# Patient Record
Sex: MALE | Race: WHITE | NOT HISPANIC OR LATINO | Employment: FULL TIME | RURAL
[De-identification: names, ages, dates, MRNs, and addresses within clinical notes are randomized per-mention and may not be internally consistent; named-entity substitution may affect disease eponyms.]

---

## 2017-03-11 PROBLEM — Z00.00 WELLNESS EXAMINATION: Status: ACTIVE | Noted: 2017-03-11

## 2017-03-11 PROBLEM — I10 HYPERTENSION: Chronic | Status: ACTIVE | Noted: 2017-03-11

## 2017-03-11 PROBLEM — E78.5 HYPERLIPIDEMIA: Chronic | Status: ACTIVE | Noted: 2017-03-11

## 2017-03-11 PROBLEM — R00.2 PALPITATIONS: Status: ACTIVE | Noted: 2017-03-11

## 2017-03-11 PROBLEM — K21.9 GASTROESOPHAGEAL REFLUX DISEASE: Chronic | Status: ACTIVE | Noted: 2017-03-11

## 2017-04-03 ENCOUNTER — LAB (OUTPATIENT)
Dept: FAMILY MEDICINE CLINIC | Facility: CLINIC | Age: 67
End: 2017-04-03

## 2017-04-03 DIAGNOSIS — Z00.00 WELLNESS EXAMINATION: ICD-10-CM

## 2017-04-03 DIAGNOSIS — I10 ESSENTIAL HYPERTENSION: Chronic | ICD-10-CM

## 2017-04-03 DIAGNOSIS — E78.5 HYPERLIPIDEMIA, UNSPECIFIED HYPERLIPIDEMIA TYPE: Primary | Chronic | ICD-10-CM

## 2017-04-03 DIAGNOSIS — Z12.5 SPECIAL SCREENING FOR MALIGNANT NEOPLASM OF PROSTATE: ICD-10-CM

## 2017-04-03 LAB
ALBUMIN SERPL-MCNC: 3.7 G/DL (ref 3.5–5)
ALBUMIN/GLOB SERPL: 1.2 G/DL (ref 1.1–2.5)
ALP SERPL-CCNC: 82 U/L (ref 24–120)
ALT SERPL-CCNC: 33 U/L (ref 0–54)
AST SERPL-CCNC: 25 U/L (ref 7–45)
BASOPHILS # BLD AUTO: 0.08 10*3/MM3 (ref 0–0.2)
BASOPHILS NFR BLD AUTO: 1.2 % (ref 0–2)
BILIRUB SERPL-MCNC: 0.6 MG/DL (ref 0.1–1)
BUN SERPL-MCNC: 17 MG/DL (ref 5–21)
BUN/CREAT SERPL: 17.2 (ref 7–25)
CALCIUM SERPL-MCNC: 9.2 MG/DL (ref 8.4–10.4)
CHLORIDE SERPL-SCNC: 104 MMOL/L (ref 98–110)
CHOLEST SERPL-MCNC: 172 MG/DL (ref 130–200)
CO2 SERPL-SCNC: 28 MMOL/L (ref 24–31)
CREAT SERPL-MCNC: 0.99 MG/DL (ref 0.5–1.4)
EOSINOPHIL # BLD AUTO: 0.29 10*3/MM3 (ref 0–0.7)
EOSINOPHIL NFR BLD AUTO: 4.3 % (ref 0–4)
ERYTHROCYTE [DISTWIDTH] IN BLOOD BY AUTOMATED COUNT: 13.9 % (ref 12–15)
GLOBULIN SER CALC-MCNC: 3.1 GM/DL
GLUCOSE SERPL-MCNC: 87 MG/DL (ref 70–100)
HCT VFR BLD AUTO: 42.2 % (ref 40–52)
HDLC SERPL-MCNC: 50 MG/DL
HGB BLD-MCNC: 13.8 G/DL (ref 14–18)
IMM GRANULOCYTES # BLD: 0.02 10*3/MM3 (ref 0–0.03)
IMM GRANULOCYTES NFR BLD: 0.3 % (ref 0–5)
LDLC SERPL CALC-MCNC: 106 MG/DL (ref 0–99)
LYMPHOCYTES # BLD AUTO: 1.99 10*3/MM3 (ref 0.72–4.86)
LYMPHOCYTES NFR BLD AUTO: 29.7 % (ref 15–45)
MCH RBC QN AUTO: 30.7 PG (ref 28–32)
MCHC RBC AUTO-ENTMCNC: 32.7 G/DL (ref 33–36)
MCV RBC AUTO: 93.8 FL (ref 82–95)
MONOCYTES # BLD AUTO: 0.57 10*3/MM3 (ref 0.19–1.3)
MONOCYTES NFR BLD AUTO: 8.5 % (ref 4–12)
NEUTROPHILS # BLD AUTO: 3.74 10*3/MM3 (ref 1.87–8.4)
NEUTROPHILS NFR BLD AUTO: 56 % (ref 39–78)
PLATELET # BLD AUTO: 261 10*3/MM3 (ref 130–400)
POTASSIUM SERPL-SCNC: 4.3 MMOL/L (ref 3.5–5.3)
PROT SERPL-MCNC: 6.8 G/DL (ref 6.3–8.7)
PSA SERPL-MCNC: 2.49 NG/ML (ref 0–4)
RBC # BLD AUTO: 4.5 10*6/MM3 (ref 4.8–5.9)
SODIUM SERPL-SCNC: 143 MMOL/L (ref 135–145)
TRIGL SERPL-MCNC: 80 MG/DL (ref 0–149)
TSH SERPL DL<=0.005 MIU/L-ACNC: 1.1 MIU/ML (ref 0.47–4.68)
VLDLC SERPL CALC-MCNC: 16 MG/DL
WBC # BLD AUTO: 6.69 10*3/MM3 (ref 4.8–10.8)

## 2017-04-13 ENCOUNTER — OFFICE VISIT (OUTPATIENT)
Dept: FAMILY MEDICINE CLINIC | Facility: CLINIC | Age: 67
End: 2017-04-13

## 2017-04-13 VITALS
BODY MASS INDEX: 27.4 KG/M2 | RESPIRATION RATE: 16 BRPM | HEIGHT: 69 IN | HEART RATE: 72 BPM | SYSTOLIC BLOOD PRESSURE: 130 MMHG | OXYGEN SATURATION: 97 % | DIASTOLIC BLOOD PRESSURE: 70 MMHG | WEIGHT: 185 LBS

## 2017-04-13 DIAGNOSIS — I10 ESSENTIAL HYPERTENSION: Chronic | ICD-10-CM

## 2017-04-13 DIAGNOSIS — M19.90 OSTEOARTHRITIS, UNSPECIFIED OSTEOARTHRITIS TYPE, UNSPECIFIED SITE: ICD-10-CM

## 2017-04-13 DIAGNOSIS — E78.5 HYPERLIPIDEMIA, UNSPECIFIED HYPERLIPIDEMIA TYPE: Chronic | ICD-10-CM

## 2017-04-13 DIAGNOSIS — K21.9 GASTROESOPHAGEAL REFLUX DISEASE, ESOPHAGITIS PRESENCE NOT SPECIFIED: Primary | Chronic | ICD-10-CM

## 2017-04-13 DIAGNOSIS — R00.2 PALPITATIONS: ICD-10-CM

## 2017-04-13 PROCEDURE — 99213 OFFICE O/P EST LOW 20 MIN: CPT | Performed by: FAMILY MEDICINE

## 2017-04-13 RX ORDER — ENALAPRIL MALEATE 20 MG/1
20 TABLET ORAL DAILY
Qty: 90 TABLET | Refills: 1 | Status: SHIPPED | OUTPATIENT
Start: 2017-04-13 | End: 2017-12-11 | Stop reason: SDUPTHER

## 2017-04-13 RX ORDER — ASPIRIN 325 MG
325 TABLET, DELAYED RELEASE (ENTERIC COATED) ORAL DAILY
COMMUNITY
End: 2019-03-27

## 2017-04-18 ENCOUNTER — TELEPHONE (OUTPATIENT)
Dept: FAMILY MEDICINE CLINIC | Facility: CLINIC | Age: 67
End: 2017-04-18

## 2017-04-18 RX ORDER — TAMSULOSIN HYDROCHLORIDE 0.4 MG/1
1 CAPSULE ORAL NIGHTLY
Qty: 90 CAPSULE | Refills: 1 | Status: SHIPPED | OUTPATIENT
Start: 2017-04-18 | End: 2018-04-30 | Stop reason: SDDI

## 2017-04-18 NOTE — TELEPHONE ENCOUNTER
"Vm \"I seen Dr Carson the other day and told him i thought I had a kidney issue coming on and he told me to watch it, and he has developed, can I get something called in for it?\"  "

## 2017-05-16 ENCOUNTER — OFFICE VISIT (OUTPATIENT)
Dept: URGENT CARE | Age: 67
End: 2017-05-16
Payer: COMMERCIAL

## 2017-05-16 VITALS
HEIGHT: 69 IN | OXYGEN SATURATION: 96 % | TEMPERATURE: 98.8 F | BODY MASS INDEX: 26.31 KG/M2 | DIASTOLIC BLOOD PRESSURE: 84 MMHG | RESPIRATION RATE: 20 BRPM | WEIGHT: 177.6 LBS | HEART RATE: 86 BPM | SYSTOLIC BLOOD PRESSURE: 138 MMHG

## 2017-05-16 DIAGNOSIS — J06.9 URI WITH COUGH AND CONGESTION: Primary | ICD-10-CM

## 2017-05-16 DIAGNOSIS — J02.9 SORE THROAT: ICD-10-CM

## 2017-05-16 LAB — S PYO AG THROAT QL: NORMAL

## 2017-05-16 PROCEDURE — 99202 OFFICE O/P NEW SF 15 MIN: CPT | Performed by: NURSE PRACTITIONER

## 2017-05-16 PROCEDURE — 87880 STREP A ASSAY W/OPTIC: CPT | Performed by: NURSE PRACTITIONER

## 2017-05-16 RX ORDER — ENALAPRIL MALEATE 20 MG/1
TABLET ORAL
COMMUNITY
Start: 2017-03-30

## 2017-05-16 RX ORDER — AZITHROMYCIN 250 MG/1
TABLET, FILM COATED ORAL
Qty: 1 PACKET | Refills: 0 | Status: SHIPPED | OUTPATIENT
Start: 2017-05-16 | End: 2017-05-26 | Stop reason: ALTCHOICE

## 2017-05-16 RX ORDER — GUAIFENESIN AND DEXTROMETHORPHAN HYDROBROMIDE 1200; 60 MG/1; MG/1
1 TABLET, EXTENDED RELEASE ORAL 2 TIMES DAILY
Qty: 28 TABLET | Refills: 0 | Status: SHIPPED | OUTPATIENT
Start: 2017-05-16

## 2017-05-16 RX ORDER — TAMSULOSIN HYDROCHLORIDE 0.4 MG/1
CAPSULE ORAL
COMMUNITY
Start: 2017-04-18 | End: 2017-05-16 | Stop reason: ALTCHOICE

## 2017-05-16 RX ORDER — BENZONATATE 100 MG/1
100 CAPSULE ORAL 3 TIMES DAILY PRN
Qty: 21 CAPSULE | Refills: 0 | Status: SHIPPED | OUTPATIENT
Start: 2017-05-16 | End: 2017-05-23

## 2017-05-16 ASSESSMENT — ENCOUNTER SYMPTOMS
NAUSEA: 0
SINUS PRESSURE: 1
SHORTNESS OF BREATH: 1
COUGH: 1
VOMITING: 0
RHINORRHEA: 1
SORE THROAT: 1
DIARRHEA: 0

## 2017-05-26 ENCOUNTER — OFFICE VISIT (OUTPATIENT)
Dept: URGENT CARE | Age: 67
End: 2017-05-26
Payer: COMMERCIAL

## 2017-05-26 VITALS
DIASTOLIC BLOOD PRESSURE: 88 MMHG | HEART RATE: 94 BPM | TEMPERATURE: 98.1 F | WEIGHT: 177 LBS | OXYGEN SATURATION: 98 % | BODY MASS INDEX: 26.22 KG/M2 | SYSTOLIC BLOOD PRESSURE: 139 MMHG | HEIGHT: 69 IN | RESPIRATION RATE: 18 BRPM

## 2017-05-26 DIAGNOSIS — J01.10 ACUTE NON-RECURRENT FRONTAL SINUSITIS: Primary | ICD-10-CM

## 2017-05-26 PROCEDURE — 99213 OFFICE O/P EST LOW 20 MIN: CPT | Performed by: PHYSICIAN ASSISTANT

## 2017-05-26 PROCEDURE — 96372 THER/PROPH/DIAG INJ SC/IM: CPT | Performed by: PHYSICIAN ASSISTANT

## 2017-05-26 RX ORDER — FLUTICASONE PROPIONATE 50 MCG
2 SPRAY, SUSPENSION (ML) NASAL DAILY
Qty: 1 BOTTLE | Refills: 0 | Status: SHIPPED | OUTPATIENT
Start: 2017-05-26

## 2017-05-26 RX ORDER — DEXAMETHASONE SODIUM PHOSPHATE 10 MG/ML
10 INJECTION INTRAMUSCULAR; INTRAVENOUS ONCE
Status: COMPLETED | OUTPATIENT
Start: 2017-05-26 | End: 2017-05-26

## 2017-05-26 RX ADMIN — DEXAMETHASONE SODIUM PHOSPHATE 10 MG: 10 INJECTION INTRAMUSCULAR; INTRAVENOUS at 11:48

## 2017-05-26 ASSESSMENT — ENCOUNTER SYMPTOMS
DIARRHEA: 0
NAUSEA: 0
RHINORRHEA: 1
ABDOMINAL PAIN: 0
SORE THROAT: 0
SINUS PRESSURE: 1
COUGH: 1
VOMITING: 0

## 2017-06-21 ENCOUNTER — PREP FOR SURGERY (OUTPATIENT)
Dept: OTHER | Facility: HOSPITAL | Age: 67
End: 2017-06-21

## 2017-06-21 DIAGNOSIS — Z12.11 ENCOUNTER FOR SCREENING COLONOSCOPY: Primary | ICD-10-CM

## 2017-06-22 RX ORDER — LIDOCAINE HYDROCHLORIDE 10 MG/ML
0.1 INJECTION, SOLUTION EPIDURAL; INFILTRATION; INTRACAUDAL; PERINEURAL ONCE AS NEEDED
Status: CANCELLED | OUTPATIENT
Start: 2017-06-22

## 2017-06-22 RX ORDER — SODIUM CHLORIDE 0.9 % (FLUSH) 0.9 %
1-10 SYRINGE (ML) INJECTION AS NEEDED
Status: CANCELLED | OUTPATIENT
Start: 2017-06-22

## 2017-07-31 ENCOUNTER — ANESTHESIA (OUTPATIENT)
Dept: GASTROENTEROLOGY | Facility: HOSPITAL | Age: 67
End: 2017-07-31

## 2017-07-31 ENCOUNTER — ANESTHESIA EVENT (OUTPATIENT)
Dept: GASTROENTEROLOGY | Facility: HOSPITAL | Age: 67
End: 2017-07-31

## 2017-07-31 ENCOUNTER — HOSPITAL ENCOUNTER (OUTPATIENT)
Facility: HOSPITAL | Age: 67
Setting detail: HOSPITAL OUTPATIENT SURGERY
Discharge: HOME | End: 2017-07-31
Attending: INTERNAL MEDICINE | Admitting: INTERNAL MEDICINE

## 2017-07-31 VITALS
HEIGHT: 69 IN | RESPIRATION RATE: 18 BRPM | WEIGHT: 174 LBS | BODY MASS INDEX: 25.77 KG/M2 | DIASTOLIC BLOOD PRESSURE: 58 MMHG | TEMPERATURE: 98.2 F | HEART RATE: 51 BPM | SYSTOLIC BLOOD PRESSURE: 120 MMHG | OXYGEN SATURATION: 99 %

## 2017-07-31 DIAGNOSIS — Z12.11 ENCOUNTER FOR SCREENING COLONOSCOPY: ICD-10-CM

## 2017-07-31 PROCEDURE — 45385 COLONOSCOPY W/LESION REMOVAL: CPT | Performed by: INTERNAL MEDICINE

## 2017-07-31 PROCEDURE — 88305 TISSUE EXAM BY PATHOLOGIST: CPT | Performed by: INTERNAL MEDICINE

## 2017-07-31 PROCEDURE — 25010000002 PROPOFOL 10 MG/ML EMULSION: Performed by: NURSE ANESTHETIST, CERTIFIED REGISTERED

## 2017-07-31 RX ORDER — ONDANSETRON 2 MG/ML
4 INJECTION INTRAMUSCULAR; INTRAVENOUS ONCE AS NEEDED
Status: DISCONTINUED | OUTPATIENT
Start: 2017-07-31 | End: 2017-07-31 | Stop reason: HOSPADM

## 2017-07-31 RX ORDER — SODIUM CHLORIDE 0.9 % (FLUSH) 0.9 %
1-10 SYRINGE (ML) INJECTION AS NEEDED
Status: DISCONTINUED | OUTPATIENT
Start: 2017-07-31 | End: 2017-07-31 | Stop reason: HOSPADM

## 2017-07-31 RX ORDER — LIDOCAINE HYDROCHLORIDE 10 MG/ML
0.1 INJECTION, SOLUTION EPIDURAL; INFILTRATION; INTRACAUDAL; PERINEURAL ONCE AS NEEDED
Status: DISCONTINUED | OUTPATIENT
Start: 2017-07-31 | End: 2017-07-31 | Stop reason: HOSPADM

## 2017-07-31 RX ORDER — PROPOFOL 10 MG/ML
VIAL (ML) INTRAVENOUS AS NEEDED
Status: DISCONTINUED | OUTPATIENT
Start: 2017-07-31 | End: 2017-07-31 | Stop reason: SURG

## 2017-07-31 RX ORDER — SODIUM CHLORIDE 0.9 % (FLUSH) 0.9 %
3 SYRINGE (ML) INJECTION AS NEEDED
Status: DISCONTINUED | OUTPATIENT
Start: 2017-07-31 | End: 2017-07-31 | Stop reason: HOSPADM

## 2017-07-31 RX ORDER — LIDOCAINE HYDROCHLORIDE 10 MG/ML
0.5 INJECTION, SOLUTION INFILTRATION; PERINEURAL ONCE AS NEEDED
Status: COMPLETED | OUTPATIENT
Start: 2017-07-31 | End: 2017-07-31

## 2017-07-31 RX ORDER — SODIUM CHLORIDE 9 MG/ML
500 INJECTION, SOLUTION INTRAVENOUS CONTINUOUS PRN
Status: DISCONTINUED | OUTPATIENT
Start: 2017-07-31 | End: 2017-07-31 | Stop reason: HOSPADM

## 2017-07-31 RX ADMIN — PROPOFOL 50 MG: 10 INJECTION, EMULSION INTRAVENOUS at 09:49

## 2017-07-31 RX ADMIN — PROPOFOL 50 MG: 10 INJECTION, EMULSION INTRAVENOUS at 09:52

## 2017-07-31 RX ADMIN — PROPOFOL 50 MG: 10 INJECTION, EMULSION INTRAVENOUS at 09:50

## 2017-07-31 RX ADMIN — SODIUM CHLORIDE 500 ML: 9 INJECTION, SOLUTION INTRAVENOUS at 08:32

## 2017-07-31 RX ADMIN — PROPOFOL 50 MG: 10 INJECTION, EMULSION INTRAVENOUS at 09:55

## 2017-07-31 RX ADMIN — LIDOCAINE HYDROCHLORIDE 0.5 ML: 10 INJECTION, SOLUTION INFILTRATION; PERINEURAL at 08:32

## 2017-07-31 NOTE — ANESTHESIA POSTPROCEDURE EVALUATION
Patient: Erwin Shah Jr.    Procedure Summary     Date Anesthesia Start Anesthesia Stop Room / Location    07/31/17 0942 1007  [facility] / [facility]       Procedure Diagnosis Surgeon Provider    COLONOSCOPY (N/A ) Encounter for screening colonoscopy  (Encounter for screening colonoscopy [Z12.11]) MD Byron Teran CRNA          Anesthesia Type: general  Last vitals  BP   115/62 (07/31/17 1025)    Temp        Pulse   56 (07/31/17 1025)   Resp   16 (07/31/17 1025)    SpO2   99 % (07/31/17 1025)      Post Anesthesia Care and Evaluation    Patient location during evaluation: PHASE II  Patient participation: complete - patient participated  Level of consciousness: awake and sleepy but conscious  Pain score: 0  Pain management: adequate  Airway patency: patent  Anesthetic complications: No anesthetic complications    Cardiovascular status: acceptable  Respiratory status: acceptable  Hydration status: acceptable

## 2017-07-31 NOTE — ANESTHESIA PREPROCEDURE EVALUATION
Anesthesia Evaluation     Patient summary reviewed and Nursing notes reviewed   NPO Solid Status: > 8 hours  NPO Liquid Status: > 8 hours     Airway   Mallampati: II  TM distance: >3 FB  Neck ROM: full  no difficulty expected  Dental      Pulmonary    (+) asthma,   Cardiovascular     Rhythm: regular    (+) hypertension, hyperlipidemia    ROS comment: Palpitations.. Negative stress test/echo    Neuro/Psych- negative ROS  GI/Hepatic/Renal/Endo    (+)  GERD,     Musculoskeletal     Abdominal    Substance History - negative use     OB/GYN negative ob/gyn ROS         Other   (+) arthritis                                     Anesthesia Plan    ASA 2     general     intravenous induction   Anesthetic plan and risks discussed with patient.

## 2017-08-01 LAB
CYTO UR: NORMAL
LAB AP CASE REPORT: NORMAL
LAB AP CLINICAL INFORMATION: NORMAL
Lab: NORMAL
PATH REPORT.FINAL DX SPEC: NORMAL
PATH REPORT.GROSS SPEC: NORMAL

## 2017-12-11 DIAGNOSIS — I10 ESSENTIAL HYPERTENSION: Chronic | ICD-10-CM

## 2017-12-11 RX ORDER — ENALAPRIL MALEATE 20 MG/1
TABLET ORAL
Qty: 90 TABLET | Refills: 1 | Status: SHIPPED | OUTPATIENT
Start: 2017-12-11 | End: 2018-04-30 | Stop reason: SDUPTHER

## 2018-04-11 DIAGNOSIS — K21.9 GASTROESOPHAGEAL REFLUX DISEASE, ESOPHAGITIS PRESENCE NOT SPECIFIED: Chronic | ICD-10-CM

## 2018-04-11 DIAGNOSIS — I10 ESSENTIAL HYPERTENSION: Chronic | ICD-10-CM

## 2018-04-11 DIAGNOSIS — Z00.00 WELLNESS EXAMINATION: ICD-10-CM

## 2018-04-11 DIAGNOSIS — E78.5 HYPERLIPIDEMIA, UNSPECIFIED HYPERLIPIDEMIA TYPE: Chronic | ICD-10-CM

## 2018-04-11 DIAGNOSIS — R00.2 PALPITATIONS: ICD-10-CM

## 2018-04-11 DIAGNOSIS — M19.90 OSTEOARTHRITIS, UNSPECIFIED OSTEOARTHRITIS TYPE, UNSPECIFIED SITE: Primary | ICD-10-CM

## 2018-04-11 PROBLEM — Z01.89 LABORATORY TEST: Status: ACTIVE | Noted: 2018-04-11

## 2018-04-13 ENCOUNTER — RESULTS ENCOUNTER (OUTPATIENT)
Dept: FAMILY MEDICINE CLINIC | Facility: CLINIC | Age: 68
End: 2018-04-13

## 2018-04-13 DIAGNOSIS — Z00.00 WELLNESS EXAMINATION: ICD-10-CM

## 2018-04-13 DIAGNOSIS — E78.5 HYPERLIPIDEMIA, UNSPECIFIED HYPERLIPIDEMIA TYPE: Chronic | ICD-10-CM

## 2018-04-13 DIAGNOSIS — M19.90 OSTEOARTHRITIS, UNSPECIFIED OSTEOARTHRITIS TYPE, UNSPECIFIED SITE: ICD-10-CM

## 2018-04-13 DIAGNOSIS — K21.9 GASTROESOPHAGEAL REFLUX DISEASE, ESOPHAGITIS PRESENCE NOT SPECIFIED: Chronic | ICD-10-CM

## 2018-04-13 DIAGNOSIS — R00.2 PALPITATIONS: ICD-10-CM

## 2018-04-13 DIAGNOSIS — I10 ESSENTIAL HYPERTENSION: Chronic | ICD-10-CM

## 2018-04-28 LAB
ALBUMIN SERPL-MCNC: 3.8 G/DL (ref 3.5–5)
ALBUMIN/GLOB SERPL: 1.3 G/DL (ref 1.1–2.5)
ALP SERPL-CCNC: 79 U/L (ref 24–120)
ALT SERPL-CCNC: 34 U/L (ref 0–54)
AST SERPL-CCNC: 25 U/L (ref 7–45)
BASOPHILS # BLD AUTO: 0.06 10*3/MM3 (ref 0–0.2)
BASOPHILS NFR BLD AUTO: 0.9 % (ref 0–2)
BILIRUB SERPL-MCNC: 0.4 MG/DL (ref 0.1–1)
BUN SERPL-MCNC: 14 MG/DL (ref 5–21)
BUN/CREAT SERPL: 16.9 (ref 7–25)
CALCIUM SERPL-MCNC: 9.1 MG/DL (ref 8.4–10.4)
CHLORIDE SERPL-SCNC: 103 MMOL/L (ref 98–110)
CHOLEST SERPL-MCNC: 149 MG/DL (ref 130–200)
CO2 SERPL-SCNC: 29 MMOL/L (ref 24–31)
CREAT SERPL-MCNC: 0.83 MG/DL (ref 0.5–1.4)
EOSINOPHIL # BLD AUTO: 0.2 10*3/MM3 (ref 0–0.7)
EOSINOPHIL NFR BLD AUTO: 3.1 % (ref 0–4)
ERYTHROCYTE [DISTWIDTH] IN BLOOD BY AUTOMATED COUNT: 12.9 % (ref 12–15)
GFR SERPLBLD CREATININE-BSD FMLA CKD-EPI: 113 ML/MIN/1.73
GFR SERPLBLD CREATININE-BSD FMLA CKD-EPI: 93 ML/MIN/1.73
GLOBULIN SER CALC-MCNC: 3 GM/DL
GLUCOSE SERPL-MCNC: 93 MG/DL (ref 70–100)
HCT VFR BLD AUTO: 41.7 % (ref 40–52)
HCV AB S/CO SERPL IA: <0.1 S/CO RATIO (ref 0–0.9)
HDLC SERPL-MCNC: 53 MG/DL
HGB BLD-MCNC: 13.7 G/DL (ref 14–18)
IMM GRANULOCYTES # BLD: 0.03 10*3/MM3 (ref 0–0.03)
IMM GRANULOCYTES NFR BLD: 0.5 % (ref 0–5)
LDLC SERPL CALC-MCNC: 83 MG/DL (ref 0–99)
LYMPHOCYTES # BLD AUTO: 2.2 10*3/MM3 (ref 0.72–4.86)
LYMPHOCYTES NFR BLD AUTO: 33.7 % (ref 15–45)
MCH RBC QN AUTO: 31 PG (ref 28–32)
MCHC RBC AUTO-ENTMCNC: 32.9 G/DL (ref 33–36)
MCV RBC AUTO: 94.3 FL (ref 82–95)
MONOCYTES # BLD AUTO: 0.58 10*3/MM3 (ref 0.19–1.3)
MONOCYTES NFR BLD AUTO: 8.9 % (ref 4–12)
NEUTROPHILS # BLD AUTO: 3.46 10*3/MM3 (ref 1.87–8.4)
NEUTROPHILS NFR BLD AUTO: 52.9 % (ref 39–78)
NRBC BLD AUTO-RTO: 0 /100 WBC (ref 0–0)
PLATELET # BLD AUTO: 271 10*3/MM3 (ref 130–400)
POTASSIUM SERPL-SCNC: 4.5 MMOL/L (ref 3.5–5.3)
PROT SERPL-MCNC: 6.8 G/DL (ref 6.3–8.7)
PSA SERPL-MCNC: 1.92 NG/ML (ref 0–4)
RBC # BLD AUTO: 4.42 10*6/MM3 (ref 4.8–5.9)
SODIUM SERPL-SCNC: 140 MMOL/L (ref 135–145)
TRIGL SERPL-MCNC: 64 MG/DL (ref 0–149)
TSH SERPL DL<=0.005 MIU/L-ACNC: 1.01 MIU/ML (ref 0.47–4.68)
VLDLC SERPL CALC-MCNC: 12.8 MG/DL
WBC # BLD AUTO: 6.53 10*3/MM3 (ref 4.8–10.8)

## 2018-04-30 ENCOUNTER — OFFICE VISIT (OUTPATIENT)
Dept: FAMILY MEDICINE CLINIC | Facility: CLINIC | Age: 68
End: 2018-04-30

## 2018-04-30 VITALS
HEART RATE: 68 BPM | DIASTOLIC BLOOD PRESSURE: 70 MMHG | HEIGHT: 69 IN | WEIGHT: 184 LBS | TEMPERATURE: 98.1 F | OXYGEN SATURATION: 98 % | BODY MASS INDEX: 27.25 KG/M2 | SYSTOLIC BLOOD PRESSURE: 136 MMHG | RESPIRATION RATE: 16 BRPM

## 2018-04-30 DIAGNOSIS — I10 ESSENTIAL HYPERTENSION: Chronic | ICD-10-CM

## 2018-04-30 DIAGNOSIS — Z79.01 ANTICOAGULATED: ICD-10-CM

## 2018-04-30 DIAGNOSIS — R00.2 PALPITATIONS: ICD-10-CM

## 2018-04-30 DIAGNOSIS — M19.90 OSTEOARTHRITIS, UNSPECIFIED OSTEOARTHRITIS TYPE, UNSPECIFIED SITE: ICD-10-CM

## 2018-04-30 DIAGNOSIS — K21.00 GASTROESOPHAGEAL REFLUX DISEASE WITH ESOPHAGITIS: Chronic | ICD-10-CM

## 2018-04-30 DIAGNOSIS — J30.9 ALLERGIC RHINITIS, UNSPECIFIED CHRONICITY, UNSPECIFIED SEASONALITY, UNSPECIFIED TRIGGER: ICD-10-CM

## 2018-04-30 DIAGNOSIS — L57.0 ACTINIC KERATOSIS: ICD-10-CM

## 2018-04-30 DIAGNOSIS — R42 VERTIGO: ICD-10-CM

## 2018-04-30 DIAGNOSIS — I10 HYPERTENSION, UNSPECIFIED TYPE: Chronic | ICD-10-CM

## 2018-04-30 DIAGNOSIS — L98.9 SKIN LESION: Primary | ICD-10-CM

## 2018-04-30 DIAGNOSIS — E78.5 HYPERLIPIDEMIA, UNSPECIFIED HYPERLIPIDEMIA TYPE: Chronic | ICD-10-CM

## 2018-04-30 PROBLEM — N40.0 PROSTATISM: Status: ACTIVE | Noted: 2018-04-30

## 2018-04-30 PROCEDURE — 99213 OFFICE O/P EST LOW 20 MIN: CPT | Performed by: FAMILY MEDICINE

## 2018-04-30 RX ORDER — FLUOROURACIL 50 MG/G
CREAM TOPICAL 2 TIMES DAILY
Qty: 40 G | Refills: 0 | Status: SHIPPED | OUTPATIENT
Start: 2018-04-30 | End: 2020-09-30 | Stop reason: SDUPTHER

## 2018-04-30 RX ORDER — ENALAPRIL MALEATE 20 MG/1
20 TABLET ORAL DAILY
Qty: 90 TABLET | Refills: 1 | Status: SHIPPED | OUTPATIENT
Start: 2018-04-30 | End: 2018-07-03 | Stop reason: SDUPTHER

## 2018-04-30 RX ORDER — DEXAMETHASONE SODIUM PHOSPHATE 4 MG/ML
10 INJECTION, SOLUTION INTRA-ARTICULAR; INTRALESIONAL; INTRAMUSCULAR; INTRAVENOUS; SOFT TISSUE ONCE
Status: COMPLETED | OUTPATIENT
Start: 2018-04-30 | End: 2018-04-30

## 2018-04-30 RX ADMIN — DEXAMETHASONE SODIUM PHOSPHATE 10 MG: 4 INJECTION, SOLUTION INTRA-ARTICULAR; INTRALESIONAL; INTRAMUSCULAR; INTRAVENOUS; SOFT TISSUE at 11:39

## 2018-04-30 NOTE — PATIENT INSTRUCTIONS
"You will be watching these lesions you treat to get red/angry/even ulcerated as you \"kill\" the precancer areas.  Goal is to do a good job with damage to the scaley area.  Treat no longer than 3 weeks.   Apply as possible only to the sore.      Goal for your blood pressure is 130 range top and 80 range bottom and you feeling better. Use us/or home monitoring to prove this.       "

## 2018-06-18 ENCOUNTER — TELEPHONE (OUTPATIENT)
Dept: FAMILY MEDICINE CLINIC | Facility: CLINIC | Age: 68
End: 2018-06-18

## 2018-06-18 NOTE — TELEPHONE ENCOUNTER
Pt called and wants to know how often he could get allergy shot states he got one in April when seen and is having a lot of allergy issues, in April received decadron 10 mg IM,

## 2018-06-19 ENCOUNTER — CLINICAL SUPPORT (OUTPATIENT)
Dept: FAMILY MEDICINE CLINIC | Facility: CLINIC | Age: 68
End: 2018-06-19

## 2018-06-19 DIAGNOSIS — J30.9 ALLERGIC RHINITIS, UNSPECIFIED CHRONICITY, UNSPECIFIED SEASONALITY, UNSPECIFIED TRIGGER: Primary | ICD-10-CM

## 2018-06-19 PROCEDURE — 96372 THER/PROPH/DIAG INJ SC/IM: CPT | Performed by: FAMILY MEDICINE

## 2018-06-19 RX ORDER — DEXAMETHASONE SODIUM PHOSPHATE 4 MG/ML
10 INJECTION, SOLUTION INTRA-ARTICULAR; INTRALESIONAL; INTRAMUSCULAR; INTRAVENOUS; SOFT TISSUE ONCE
Status: COMPLETED | OUTPATIENT
Start: 2018-06-19 | End: 2018-06-19

## 2018-06-19 RX ADMIN — DEXAMETHASONE SODIUM PHOSPHATE 10 MG: 4 INJECTION, SOLUTION INTRA-ARTICULAR; INTRALESIONAL; INTRAMUSCULAR; INTRAVENOUS; SOFT TISSUE at 09:30

## 2018-07-03 DIAGNOSIS — I10 ESSENTIAL HYPERTENSION: Chronic | ICD-10-CM

## 2018-07-05 RX ORDER — ENALAPRIL MALEATE 20 MG/1
TABLET ORAL
Qty: 90 TABLET | Refills: 1 | Status: SHIPPED | OUTPATIENT
Start: 2018-07-05 | End: 2018-12-06 | Stop reason: SDUPTHER

## 2018-08-29 ENCOUNTER — TELEPHONE (OUTPATIENT)
Dept: FAMILY MEDICINE CLINIC | Facility: CLINIC | Age: 68
End: 2018-08-29

## 2018-08-29 RX ORDER — AZITHROMYCIN 250 MG/1
TABLET, FILM COATED ORAL
Qty: 6 TABLET | Refills: 0 | Status: SHIPPED | OUTPATIENT
Start: 2018-08-29 | End: 2018-12-06

## 2018-08-29 NOTE — TELEPHONE ENCOUNTER
"Vm \"I have a sinus infection, I usually get one this year. My sinus are tender and pressure around my eyes and nose. Using claritin and flonase\"    Verbal per Dr Carson \"repeat last\" notified pt and wants sent to [facility]     zpack sent  "

## 2018-12-06 ENCOUNTER — TELEPHONE (OUTPATIENT)
Dept: FAMILY MEDICINE CLINIC | Facility: CLINIC | Age: 68
End: 2018-12-06

## 2018-12-06 DIAGNOSIS — I10 ESSENTIAL HYPERTENSION: Chronic | ICD-10-CM

## 2018-12-06 RX ORDER — ENALAPRIL MALEATE 20 MG/1
20 TABLET ORAL DAILY
Qty: 90 TABLET | Refills: 1 | Status: SHIPPED | OUTPATIENT
Start: 2018-12-06 | End: 2019-06-19 | Stop reason: SDUPTHER

## 2018-12-06 NOTE — TELEPHONE ENCOUNTER
Called patient.  No tick bites, rashes and feels achy with mild sinus c/o.  No cough , diarrhea, vomiting.     Advised best to wait and see if this is a 2-3 more day thing or worsens; keep us informed.   Advised vasotec was refilled.

## 2018-12-06 NOTE — TELEPHONE ENCOUNTER
May have vasotec    If he has respiratory symptoms with the others; he could have flu and I advise flu testing  (he maybe in [place]??)

## 2018-12-06 NOTE — TELEPHONE ENCOUNTER
"Called pt and advised \"I dont have any resp problems\" pt did not want to come in for flu test  "

## 2018-12-06 NOTE — TELEPHONE ENCOUNTER
"Vm \" need a refill of vasotec and I need something called in? I have headache, body aches, joints hurt like crazy and I had a 99.4 temp last night\"  "

## 2019-03-25 DIAGNOSIS — R00.2 PALPITATIONS: ICD-10-CM

## 2019-03-25 DIAGNOSIS — I10 HYPERTENSION, UNSPECIFIED TYPE: Primary | ICD-10-CM

## 2019-03-25 DIAGNOSIS — E78.5 HYPERLIPIDEMIA, UNSPECIFIED HYPERLIPIDEMIA TYPE: ICD-10-CM

## 2019-03-26 ENCOUNTER — RESULTS ENCOUNTER (OUTPATIENT)
Dept: FAMILY MEDICINE CLINIC | Facility: CLINIC | Age: 69
End: 2019-03-26

## 2019-03-26 DIAGNOSIS — R00.2 PALPITATIONS: ICD-10-CM

## 2019-03-26 DIAGNOSIS — E78.5 HYPERLIPIDEMIA, UNSPECIFIED HYPERLIPIDEMIA TYPE: ICD-10-CM

## 2019-03-26 DIAGNOSIS — I10 HYPERTENSION, UNSPECIFIED TYPE: ICD-10-CM

## 2019-03-27 ENCOUNTER — OFFICE VISIT (OUTPATIENT)
Dept: FAMILY MEDICINE CLINIC | Facility: CLINIC | Age: 69
End: 2019-03-27

## 2019-03-27 VITALS
HEIGHT: 69 IN | RESPIRATION RATE: 18 BRPM | HEART RATE: 74 BPM | BODY MASS INDEX: 28.73 KG/M2 | OXYGEN SATURATION: 97 % | DIASTOLIC BLOOD PRESSURE: 78 MMHG | TEMPERATURE: 98.4 F | WEIGHT: 194 LBS | SYSTOLIC BLOOD PRESSURE: 110 MMHG

## 2019-03-27 DIAGNOSIS — N40.0 PROSTATISM: ICD-10-CM

## 2019-03-27 DIAGNOSIS — J30.9 ALLERGIC RHINITIS, UNSPECIFIED SEASONALITY, UNSPECIFIED TRIGGER: ICD-10-CM

## 2019-03-27 DIAGNOSIS — M19.90 OSTEOARTHRITIS, UNSPECIFIED OSTEOARTHRITIS TYPE, UNSPECIFIED SITE: ICD-10-CM

## 2019-03-27 DIAGNOSIS — I10 ESSENTIAL HYPERTENSION: Chronic | ICD-10-CM

## 2019-03-27 DIAGNOSIS — E78.2 MIXED HYPERLIPIDEMIA: Chronic | ICD-10-CM

## 2019-03-27 DIAGNOSIS — Z79.01 ANTICOAGULATED: ICD-10-CM

## 2019-03-27 DIAGNOSIS — K21.00 GASTROESOPHAGEAL REFLUX DISEASE WITH ESOPHAGITIS: Chronic | ICD-10-CM

## 2019-03-27 DIAGNOSIS — R42 VERTIGO: Primary | ICD-10-CM

## 2019-03-27 DIAGNOSIS — Z00.00 WELLNESS EXAMINATION: ICD-10-CM

## 2019-03-27 LAB
ALBUMIN SERPL-MCNC: 4.1 G/DL (ref 3.5–5.2)
ALBUMIN/GLOB SERPL: 1.5 G/DL
ALP SERPL-CCNC: 80 U/L (ref 39–117)
ALT SERPL-CCNC: 22 U/L (ref 1–41)
AST SERPL-CCNC: 16 U/L (ref 1–40)
BASOPHILS # BLD AUTO: 0.06 10*3/MM3 (ref 0–0.2)
BASOPHILS NFR BLD AUTO: 0.9 % (ref 0–1.5)
BILIRUB SERPL-MCNC: 0.4 MG/DL (ref 0.2–1.2)
BUN SERPL-MCNC: 13 MG/DL (ref 8–23)
BUN/CREAT SERPL: 15.1 (ref 7–25)
CALCIUM SERPL-MCNC: 9.5 MG/DL (ref 8.6–10.5)
CHLORIDE SERPL-SCNC: 105 MMOL/L (ref 98–107)
CHOLEST SERPL-MCNC: 176 MG/DL (ref 0–200)
CHOLEST/HDLC SERPL: 3.59 {RATIO}
CO2 SERPL-SCNC: 26.6 MMOL/L (ref 22–29)
CREAT SERPL-MCNC: 0.86 MG/DL (ref 0.76–1.27)
EOSINOPHIL # BLD AUTO: 0.34 10*3/MM3 (ref 0–0.4)
EOSINOPHIL NFR BLD AUTO: 4.9 % (ref 0.3–6.2)
ERYTHROCYTE [DISTWIDTH] IN BLOOD BY AUTOMATED COUNT: 13.8 % (ref 12.3–15.4)
GLOBULIN SER CALC-MCNC: 2.8 GM/DL
GLUCOSE SERPL-MCNC: 94 MG/DL (ref 65–99)
HCT VFR BLD AUTO: 45.1 % (ref 37.5–51)
HDLC SERPL-MCNC: 49 MG/DL (ref 40–60)
HGB BLD-MCNC: 14.2 G/DL (ref 13–17.7)
IMM GRANULOCYTES # BLD AUTO: 0.02 10*3/MM3 (ref 0–0.05)
IMM GRANULOCYTES NFR BLD AUTO: 0.3 % (ref 0–0.5)
LDLC SERPL CALC-MCNC: 113 MG/DL (ref 0–100)
LYMPHOCYTES # BLD AUTO: 1.94 10*3/MM3 (ref 0.7–3.1)
LYMPHOCYTES NFR BLD AUTO: 27.8 % (ref 19.6–45.3)
MCH RBC QN AUTO: 31 PG (ref 26.6–33)
MCHC RBC AUTO-ENTMCNC: 31.5 G/DL (ref 31.5–35.7)
MCV RBC AUTO: 98.5 FL (ref 79–97)
MONOCYTES # BLD AUTO: 0.54 10*3/MM3 (ref 0.1–0.9)
MONOCYTES NFR BLD AUTO: 7.7 % (ref 5–12)
NEUTROPHILS # BLD AUTO: 4.08 10*3/MM3 (ref 1.4–7)
NEUTROPHILS NFR BLD AUTO: 58.4 % (ref 42.7–76)
NRBC BLD AUTO-RTO: 0 /100 WBC (ref 0–0)
PLATELET # BLD AUTO: 273 10*3/MM3 (ref 140–450)
POTASSIUM SERPL-SCNC: 4.9 MMOL/L (ref 3.5–5.2)
PROT SERPL-MCNC: 6.9 G/DL (ref 6–8.5)
RBC # BLD AUTO: 4.58 10*6/MM3 (ref 4.14–5.8)
SODIUM SERPL-SCNC: 141 MMOL/L (ref 136–145)
TRIGL SERPL-MCNC: 71 MG/DL (ref 0–150)
TSH SERPL DL<=0.005 MIU/L-ACNC: 1.14 MIU/ML (ref 0.27–4.2)
VLDLC SERPL CALC-MCNC: 14.2 MG/DL (ref 5–40)
WBC # BLD AUTO: 6.98 10*3/MM3 (ref 3.4–10.8)

## 2019-03-27 PROCEDURE — 96372 THER/PROPH/DIAG INJ SC/IM: CPT | Performed by: FAMILY MEDICINE

## 2019-03-27 PROCEDURE — 96160 PT-FOCUSED HLTH RISK ASSMT: CPT | Performed by: FAMILY MEDICINE

## 2019-03-27 PROCEDURE — G0439 PPPS, SUBSEQ VISIT: HCPCS | Performed by: FAMILY MEDICINE

## 2019-03-27 RX ORDER — FLUTICASONE PROPIONATE 50 MCG
1 SPRAY, SUSPENSION (ML) NASAL DAILY
COMMUNITY
End: 2020-09-30

## 2019-03-27 RX ORDER — ASPIRIN 325 MG
325 TABLET, DELAYED RELEASE (ENTERIC COATED) ORAL DAILY
COMMUNITY

## 2019-03-27 RX ORDER — LORATADINE 10 MG/1
1 CAPSULE, LIQUID FILLED ORAL DAILY
COMMUNITY

## 2019-03-27 RX ORDER — DEXAMETHASONE SODIUM PHOSPHATE 4 MG/ML
10 INJECTION, SOLUTION INTRA-ARTICULAR; INTRALESIONAL; INTRAMUSCULAR; INTRAVENOUS; SOFT TISSUE ONCE
Status: COMPLETED | OUTPATIENT
Start: 2019-03-27 | End: 2019-03-27

## 2019-03-27 RX ORDER — MECLIZINE HYDROCHLORIDE 25 MG/1
25 TABLET ORAL 3 TIMES DAILY PRN
Qty: 30 TABLET | Refills: 1 | Status: SHIPPED | OUTPATIENT
Start: 2019-03-27 | End: 2019-12-03 | Stop reason: SDUPTHER

## 2019-03-27 RX ORDER — DEXAMETHASONE SODIUM PHOSPHATE 10 MG/ML
10 INJECTION INTRAMUSCULAR; INTRAVENOUS ONCE
Status: DISCONTINUED | OUTPATIENT
Start: 2019-03-27 | End: 2019-03-27

## 2019-03-27 RX ADMIN — DEXAMETHASONE SODIUM PHOSPHATE 10 MG: 4 INJECTION, SOLUTION INTRA-ARTICULAR; INTRALESIONAL; INTRAMUSCULAR; INTRAVENOUS; SOFT TISSUE at 10:18

## 2019-03-27 NOTE — PROGRESS NOTES
Pt had a decadron injection while here for his appt. Decadron 10mg given in the RUQ. Pt tolerated injection well

## 2019-03-27 NOTE — PROGRESS NOTES
Subjective   Erwin Shah Jr. is a 68 y.o. male presenting with chief complaint of:   Chief Complaint   Patient presents with   • Annual Exam     Wellness exam       History of Present Illness :  Alone.   Has multiple chronic problems to consider that might have a bearing on today's issues;  an interval appointment.       Chronic/acute problems reviewed today:   1. Vertigo-? allergy related: chronic/stable on/off infrequent and improved with antivert; would like Rx for (over OTC)   2. Essential hypertension: Chronic/stable. Stable here/if home blood pressures.  No significant chest pain, SOB, LE edema, orthopnea, near syncope, dizziness/light headness.      3. Mixed hyperlipidemia: Chronic/stable: prefers no statin, diet-exercise advised and lab monitored.      4. Gastroesophageal reflux disease with esophagitis: Chronic/stable.  Controlled heartburn, reflux; no dysphagia, melena.  Rx not needed.     5. Prostatism: Chronic/stable.  Slower but tolerated stream with complete emptying.  Nocturia on occ; tolerated.  No desire to persure evaluations/surgery;      6. Osteoarthritis, unspecified osteoarthritis type, unspecified site: Chronic/stable.  Various on/off joint pains/soreness/stiffness.  Particular joint problems with rare knee.  No joint swelling.  Treats mainly with occ tylenol.     7. Allergic rhinitis, unspecified seasonality, unspecified trigger: chronic/stable spring/fall and finds decadron once spring works for 4 months.  Says less nasal congestion, eye drainage/itching.   8. Anticoagulated prevention+DJD/(), ASA 81x2: Chronic/stable reason and use of.  Denies bleeding issues; especially epistaxis, melena, hematochezia.  Upper arms bruise easily.      9. Wellness examination-done: Chronic ongoing over time screening or advice for general health care/wellness.        Has an/another acute issue today: none.    The following portions of the patient's history were reviewed and updated as appropriate:  allergies, current medications, past family history, past medical history, past social history, past surgical history and problem list.      Current Outpatient Medications:   •  aspirin 81 MG EC tablet, Take 81 mg by mouth Daily. Takes 2 tablets daily., Disp: , Rfl:   •  enalapril (VASOTEC) 20 MG tablet, Take 1 tablet by mouth Daily., Disp: 90 tablet, Rfl: 1  •  fluticasone (FLONASE) 50 MCG/ACT nasal spray, 1 spray into the nostril(s) as directed by provider Daily., Disp: , Rfl:   •  Loratadine (CLARITIN) 10 MG capsule, Take 1 capsule by mouth Daily., Disp: , Rfl:   •  fluorouracil (EFUDEX) 5 % cream, Apply  topically 2 (Two) Times a Day., Disp: 40 g, Rfl: 0  •  meclizine (ANTIVERT) 25 MG tablet, Take 1 tablet by mouth 3 (Three) Times a Day As Needed for dizziness., Disp: 30 tablet, Rfl: 1  No current facility-administered medications for this visit.     No problems with medications.  Refills if needed done    No Known Allergies    Review of Systems  GENERAL:  Active/slower with limits, speed, stamina for age; minimal influence. Sleep is ok; apnea denied. No fever now.  SKIN: No rash/skin lesion of concern:  ENDO:  No syncope, near or diaphoretic sweaty spells.  HEENT: No recent head injury; or headache.  No vision change.   No significant hearing loss.  Ears without pain/drainage.  No sore throat.  Occ on/off nasal/sinus congestion/drainage. No epistaxis.  CHEST: No chest wall tenderness or mass. No cough,  without wheeze.  No SOB; no hemoptysis.  CV: No chest pain, palpitations, ankle edema.  GI: No heartburn, dysphagia.  No abdominal pain, diarrhea, constipation.  No rectal bleeding, or melena.    :  Voids without dysuria, or  incontinence to completion.  AUA =2 (1,)  ORTHO: No painful/swollen joints but various on /off sore.  No change occ sore neck or back.  No acute neck or back pain without recent injury.  NEURO: No dizziness, weakness of extremities.  No numbness/paresthesias.   PSYCH: No memory loss.   Mood good; not that anxious, depressed but/and not suicidal.  Tries to tolerate stress .   Screening:  Mammogram: NA  Bone density: NA  Low dose CT chest: non-smoker/NA  GI:   EGD+biop/[facility]/Shiben/5.7.10/3y  Colon-polyp/[facility]/Shieben/7.31.17/5y  Prostate: today  Usual lab order  12m CBC, CMP, LIPID, TSH, PSAs    Lab Results:  Results for orders placed or performed in visit on 03/26/19   Comprehensive metabolic panel   Result Value Ref Range    Glucose 94 65 - 99 mg/dL    BUN 13 8 - 23 mg/dL    Creatinine 0.86 0.76 - 1.27 mg/dL    eGFR Non African Am 89 >60 mL/min/1.73    eGFR African Am 108 >60 mL/min/1.73    BUN/Creatinine Ratio 15.1 7.0 - 25.0    Sodium 141 136 - 145 mmol/L    Potassium 4.9 3.5 - 5.2 mmol/L    Chloride 105 98 - 107 mmol/L    Total CO2 26.6 22.0 - 29.0 mmol/L    Calcium 9.5 8.6 - 10.5 mg/dL    Total Protein 6.9 6.0 - 8.5 g/dL    Albumin 4.10 3.50 - 5.20 g/dL    Globulin 2.8 gm/dL    A/G Ratio 1.5 g/dL    Total Bilirubin 0.4 0.2 - 1.2 mg/dL    Alkaline Phosphatase 80 39 - 117 U/L    AST (SGOT) 16 1 - 40 U/L    ALT (SGPT) 22 1 - 41 U/L   Lipid Panel With / Chol / HDL Ratio   Result Value Ref Range    Total Cholesterol 176 0 - 200 mg/dL    Triglycerides 71 0 - 150 mg/dL    HDL Cholesterol 49 40 - 60 mg/dL    VLDL Cholesterol 14.2 5 - 40 mg/dL    LDL Cholesterol  113 (H) 0 - 100 mg/dL    Chol/HDL Ratio 3.59    TSH   Result Value Ref Range    TSH 1.140 0.270 - 4.200 mIU/mL   CBC & Differential   Result Value Ref Range    WBC 6.98 3.40 - 10.80 10*3/mm3    RBC 4.58 4.14 - 5.80 10*6/mm3    Hemoglobin 14.2 13.0 - 17.7 g/dL    Hematocrit 45.1 37.5 - 51.0 %    MCV 98.5 (H) 79.0 - 97.0 fL    MCH 31.0 26.6 - 33.0 pg    MCHC 31.5 31.5 - 35.7 g/dL    RDW 13.8 12.3 - 15.4 %    Platelets 273 140 - 450 10*3/mm3    Neutrophil Rel % 58.4 42.7 - 76.0 %    Lymphocyte Rel % 27.8 19.6 - 45.3 %    Monocyte Rel % 7.7 5.0 - 12.0 %    Eosinophil Rel % 4.9 0.3 - 6.2 %    Basophil Rel % 0.9 0.0 - 1.5 %    Neutrophils Absolute 4.08  "1.40 - 7.00 10*3/mm3    Lymphocytes Absolute 1.94 0.70 - 3.10 10*3/mm3    Monocytes Absolute 0.54 0.10 - 0.90 10*3/mm3    Eosinophils Absolute 0.34 0.00 - 0.40 10*3/mm3    Basophils Absolute 0.06 0.00 - 0.20 10*3/mm3    Immature Granulocyte Rel % 0.3 0.0 - 0.5 %    Immature Grans Absolute 0.02 0.00 - 0.05 10*3/mm3    nRBC 0.0 0.0 - 0.0 /100 WBC       A1C:No results for input(s): HGBA1C in the last 53785 hours.  PSA:  Lab Results - Last 18 Months   Lab Units 04/27/18  0739   PSA ng/mL 1.920     CBC:  Lab Results - Last 18 Months   Lab Units 03/26/19  0734 04/27/18  0739   WBC 10*3/mm3 6.98 6.53   HEMOGLOBIN g/dL 14.2 13.7*   HEMATOCRIT % 45.1 41.7   PLATELETS 10*3/mm3 273 271      BMP/CMP:  Lab Results - Last 18 Months   Lab Units 03/26/19  0734 04/27/18  0739   SODIUM mmol/L 141 140   POTASSIUM mmol/L 4.9 4.5   CHLORIDE mmol/L 105 103   TOTAL CO2 mmol/L 26.6 29.0   GLUCOSE mg/dL 94 93   BUN mg/dL 13 14   CREATININE mg/dL 0.86 0.83   EGFR IF NONAFRICN AM mL/min/1.73 89 93   EGFR IF AFRICN AM mL/min/1.73 108 113   CALCIUM mg/dL 9.5 9.1     HEPATIC:  Lab Results - Last 18 Months   Lab Units 03/26/19  0734 04/27/18  0739   ALT (SGPT) U/L 22 34   AST (SGOT) U/L 16 25   ALK PHOS U/L 80 79     THYROID:  Lab Results - Last 18 Months   Lab Units 03/26/19  0734 04/27/18  0739   TSH mIU/mL 1.140 1.010       Objective   /78 (BP Location: Left arm, Patient Position: Sitting, Cuff Size: Adult)   Pulse 74   Temp 98.4 °F (36.9 °C) (Oral)   Resp 18   Ht 175.3 cm (69\")   Wt 88 kg (194 lb)   SpO2 97%   BMI 28.65 kg/m²   Body mass index is 28.65 kg/m².    Physical Exam  GENERAL:  Well nourished/developed in no acute distress.   SKIN: Turgor excellent, without wound, rash, lesion other than: PHOTO-R cheek.   HEENT: Normal cephalic without trauma.  Pupils equal round reactive to light. Extraocular motions full without nystagmus.   External canals nonobstructive nontender without reddness. Tymphatic membranes roque with iveth " structures intact.   Oral cavity without growths, exudates, and moist.  Posterior pharynx without mass, obstruction, redness.  No thyromegaly, mass, tenderness, lymphadenopathy and supple.  CV: Regular rhythm.  No murmur, gallop,  edema. Posterior pulses intact.  No carotid bruits.  CHEST: No chest wall tenderness or mass.   LUNGS: Symmetric motion with clear to auscultation.    ABD: Soft, nontender without mass.   PROSTATE: No visible/palpable lesion/tenderness and anal tone intact/full.  Prostate 20 gm/smooth and nontender.  No rectal mass within reach. Stool on glove brown.   ORTHO: Symmetric extremities without swelling/point tenderness.  Full gross range of motion.  NEURO: CN 2-12 grossly intact.  Symmetric facies and UE/LE. 4/5 strength throughout. 1/4 x bicep knee equal reflexe.  Nonfocal use extremities. Speech clear.  Intact light touch with monofilament, vibratory sensation with tuning fork; equal toes/distal feet.    PSYCH: Oriented x 3.  Pleasant calm, well kept.  Purposeful/directed conservation with intact short/long gross memory.   Screening:  Mammogram: NA  Bone density: NA  Low dose CT chest: non-smoker: NA  GI: Colon-polyp/[facility]/Shieben/7.31.17/5y  Prostate: 3.27.19; today  Usual lab order  12m CBC, CMP, LIPID, TSH, PSAs    Assessment/Plan     1. Vertigo-? allergy related    2. Essential hypertension    3. Mixed hyperlipidemia    4. Gastroesophageal reflux disease with esophagitis    5. Prostatism    6. Osteoarthritis, unspecified osteoarthritis type, unspecified site    7. Allergic rhinitis, unspecified seasonality, unspecified trigger    8. Anticoagulated prevention+DJD/(), ASA 81x2    9. Wellness examination-done      Overall doing wlel    Rx: reviewed/changes:  New Medications Ordered This Visit   Medications   • meclizine (ANTIVERT) 25 MG tablet     Sig: Take 1 tablet by mouth 3 (Three) Times a Day As Needed for dizziness.     Dispense:  30 tablet     Refill:  1   • dexamethasone  (DECADRON) injection 10 mg       LAB/Testing/Referrals: reviewed/orders:   Today:   No orders of the defined types were placed in this encounter.    Chronic/recurrent labs above or change to:   12m; psa if wants 4.27.19     Discussions:   Reason PSA later  Body mass index is 28.65 kg/m².  Patient's Body mass index is 28.65 kg/m². BMI is above normal parameters. Recommendations include: exercise counseling, nutrition counseling and advised; 10# weight loss.    Tobacco use reviewed:  Non-smoker    Patient Instructions   PSA would be an option after 4.27.19.        Follow up: Return for lab during/or just before next apt;, Dr Carson-, 12m;.  No future appointments.

## 2019-03-28 NOTE — PROGRESS NOTES
Subsequent Medicare Wellness Visit   The ABC's of the Annual Wellness Visit    Chief Complaint   Patient presents with   • Annual Exam     Wellness exam       HPI:  Erwin Shah Jr., -1950, is a 68 y.o. male who presents for a Subsequent Medicare Wellness Visit.    Recent Hospitalizations:  No hospitalization(s) within the last year..    Current Medical Providers:  Patient Care Team:  Isidro Carson MD as PCP - General (Family Medicine)    Health Habits and Functional and Cognitive Screening and Depression Screening:  No flowsheet data found.    Compared to one year ago, the patient feels his physical health is the same and his mental health is the same.    Depression Screen:  PHQ-2/PHQ-9 Depression Screening 3/27/2019   Little interest or pleasure in doing things 0   Feeling down, depressed, or hopeless 0   Total Score 0         Past Medical/Family/Social History:  The following portions of the patient's history were reviewed and updated as appropriate: allergies, current medications, past family history, past medical history, past social history, past surgical history and problem list.    No Known Allergies      Current Outpatient Medications:   •  aspirin 81 MG EC tablet, Take 81 mg by mouth Daily. Takes 2 tablets daily., Disp: , Rfl:   •  enalapril (VASOTEC) 20 MG tablet, Take 1 tablet by mouth Daily., Disp: 90 tablet, Rfl: 1  •  fluticasone (FLONASE) 50 MCG/ACT nasal spray, 1 spray into the nostril(s) as directed by provider Daily., Disp: , Rfl:   •  Loratadine (CLARITIN) 10 MG capsule, Take 1 capsule by mouth Daily., Disp: , Rfl:   •  fluorouracil (EFUDEX) 5 % cream, Apply  topically 2 (Two) Times a Day., Disp: 40 g, Rfl: 0  •  meclizine (ANTIVERT) 25 MG tablet, Take 1 tablet by mouth 3 (Three) Times a Day As Needed for dizziness., Disp: 30 tablet, Rfl: 1  No current facility-administered medications for this visit.     Aspirin use counseling: Taking ASA appropriately as indicated    Current  medication list contains no high risk medications.  No harmful drug interactions have been identified.     Family History   Problem Relation Age of Onset   • No Known Problems Mother    • Liver disease Father        Social History     Tobacco Use   • Smoking status: Never Smoker   • Smokeless tobacco: Never Used   Substance Use Topics   • Alcohol use: Yes     Comment: occasional       Past Surgical History:   Procedure Laterality Date   • CHOLECYSTECTOMY     • COLONOSCOPY N/A 7/31/2017    Procedure: COLONOSCOPY;  Surgeon: Ajith Perez MD;  Location: [facility] ENDOSCOPY;  Service:    • ENDOSCOPY  05/07/2010       Patient Active Problem List   Diagnosis   • Wellness examination-done   • Gastroesophageal reflux disease   • Hyperlipidemia-offered tx   • Hypertension   • Palpitations   • Degenerative joint disease   • Laboratory test   • Anticoagulated prevention+DJD/(), ASA 81x2   • Allergic rhinitis   • Vertigo-? allergy related   • Prostatism   • Skin lesion   • Actinic keratosis       Review of Systems  GENERAL:  Active/slower with limits, speed, stamina for age; minimal influence. Sleep is ok; apnea denied. No fever now.  SKIN: No rash/skin lesion of concern:  ENDO:  No syncope, near or diaphoretic sweaty spells.  HEENT: No recent head injury; or headache.  No vision change.   No significant hearing loss.  Ears without pain/drainage.  No sore throat.  Occ on/off nasal/sinus congestion/drainage. No epistaxis.  CHEST: No chest wall tenderness or mass. No cough,  without wheeze.  No SOB; no hemoptysis.  CV: No chest pain, palpitations, ankle edema.  GI: No heartburn, dysphagia.  No abdominal pain, diarrhea, constipation.  No rectal bleeding, or melena.    :  Voids without dysuria, or  incontinence to completion.  AUA =2 (1,)  ORTHO: No painful/swollen joints but various on /off sore.  No change occ sore neck or back.  No acute neck or back pain without recent injury.  NEURO: No dizziness, weakness of  "extremities.  No numbness/paresthesias.   PSYCH: No memory loss.  Mood good; not that anxious, depressed but/and not suicidal.  Tries to tolerate stress .   Screening:  Mammogram: NA  Bone density: NA  Low dose CT chest: non-smoker/NA  GI:   EGD+biop/LH/Shiben/5.7.10/3y  Colon-polyp/[facility]/Shieben/7.31.17/5y  Prostate: today  Usual lab order  12m CBC, CMP, LIPID, TSH, PSAs      Objective     Vitals:    03/27/19 0927   BP: 110/78   BP Location: Left arm   Patient Position: Sitting   Cuff Size: Adult   Pulse: 74   Resp: 18   Temp: 98.4 °F (36.9 °C)   TempSrc: Oral   SpO2: 97%   Weight: 88 kg (194 lb)   Height: 175.3 cm (69\")       Patient's Body mass index is 28.65 kg/m². BMI is above normal parameters. Recommendations include: exercise counseling and nutrition counseling.      The patient has no evidence of cognitve impairment.     Physical Exam  GENERAL:  Well nourished/developed in no acute distress.   SKIN: Turgor excellent, without wound, rash, lesion other than: PHOTO-R cheek.   HEENT: Normal cephalic without trauma.  Pupils equal round reactive to light. Extraocular motions full without nystagmus.   External canals nonobstructive nontender without reddness. Tymphatic membranes roque with iveth structures intact.   Oral cavity without growths, exudates, and moist.  Posterior pharynx without mass, obstruction, redness.  No thyromegaly, mass, tenderness, lymphadenopathy and supple.  CV: Regular rhythm.  No murmur, gallop,  edema. Posterior pulses intact.  No carotid bruits.  CHEST: No chest wall tenderness or mass.   LUNGS: Symmetric motion with clear to auscultation.    ABD: Soft, nontender without mass.   PROSTATE: No visible/palpable lesion/tenderness and anal tone intact/full.  Prostate 20 gm/smooth and nontender.  No rectal mass within reach. Stool on glove brown.   ORTHO: Symmetric extremities without swelling/point tenderness.  Full gross range of motion.  NEURO: CN 2-12 grossly intact.  Symmetric facies and " UE/LE. 4/5 strength throughout. 1/4 x bicep knee equal reflexe.  Nonfocal use extremities. Speech clear.  Intact light touch with monofilament, vibratory sensation with tuning fork; equal toes/distal feet.    PSYCH: Oriented x 3.  Pleasant calm, well kept.  Purposeful/directed conservation with intact short/long gross memory.     Recent Lab Results:  Lab Results   Component Value Date    GLU 94 03/26/2019     Lab Results   Component Value Date    TRIG 71 03/26/2019    HDL 49 03/26/2019    VLDL 14.2 03/26/2019       Assessment/Plan   Age-appropriate Screening Schedule:  Refer to the list below for future screening recommendations based on patient's age, sex and/or medical conditions.      Health Maintenance   Topic Date Due   • TDAP/TD VACCINES (1 - Tdap) 12/26/1971   • ZOSTER VACCINE (1 of 2) 12/26/2002   • PNEUMOCOCCAL VACCINES (65+ LOW/MEDIUM RISK) (1 of 2 - PCV13) 12/26/2017   • INFLUENZA VACCINE  08/01/2018   • LIPID PANEL  04/27/2019   • COLONOSCOPY  07/31/2022       Medicare Risks and Personalized Health Plan:  immunizations for age      CMS-Preventive Services Quick Reference  Medicare Preventive Services Addressed:  Prostate Cancer Screening     Advance Care Planning:  Patient does not have an advance directive - not interested in additional information    Diagnoses and all orders for this visit:    1. Vertigo-? allergy related (Primary)  -     meclizine (ANTIVERT) 25 MG tablet; Take 1 tablet by mouth 3 (Three) Times a Day As Needed for dizziness.  Dispense: 30 tablet; Refill: 1  -     dexamethasone (DECADRON) injection 10 mg    2. Essential hypertension  -     dexamethasone (DECADRON) injection 10 mg    3. Mixed hyperlipidemia  -     dexamethasone (DECADRON) injection 10 mg    4. Gastroesophageal reflux disease with esophagitis  -     dexamethasone (DECADRON) injection 10 mg    5. Prostatism  -     dexamethasone (DECADRON) injection 10 mg    6. Osteoarthritis, unspecified osteoarthritis type, unspecified  site  -     dexamethasone (DECADRON) injection 10 mg    7. Allergic rhinitis, unspecified seasonality, unspecified trigger  -     Discontinue: dexamethasone (DECADRON) injection 10 mg  -     dexamethasone (DECADRON) injection 10 mg    8. Anticoagulated prevention+DJD/(), ASA 81x2  -     dexamethasone (DECADRON) injection 10 mg    9. Wellness examination-done        An After Visit Summary and PPPS with all of these plans were given to the patient.      Follow Up:  Return for lab during/or just before next apt;, Dr Carson-, 12m;.

## 2019-06-19 DIAGNOSIS — I10 ESSENTIAL HYPERTENSION: Chronic | ICD-10-CM

## 2019-06-19 RX ORDER — ENALAPRIL MALEATE 20 MG/1
TABLET ORAL
Qty: 90 TABLET | Refills: 0 | Status: SHIPPED | OUTPATIENT
Start: 2019-06-19 | End: 2019-09-17 | Stop reason: SDUPTHER

## 2019-09-17 DIAGNOSIS — I10 ESSENTIAL HYPERTENSION: Chronic | ICD-10-CM

## 2019-09-17 RX ORDER — ENALAPRIL MALEATE 20 MG/1
TABLET ORAL
Qty: 90 TABLET | Refills: 0 | Status: SHIPPED | OUTPATIENT
Start: 2019-09-17 | End: 2019-12-03 | Stop reason: SDUPTHER

## 2019-12-03 DIAGNOSIS — R42 VERTIGO: ICD-10-CM

## 2019-12-03 DIAGNOSIS — I10 ESSENTIAL HYPERTENSION: Chronic | ICD-10-CM

## 2019-12-03 RX ORDER — MECLIZINE HYDROCHLORIDE 25 MG/1
TABLET ORAL
Qty: 90 TABLET | Refills: 1 | Status: SHIPPED | OUTPATIENT
Start: 2019-12-03 | End: 2020-07-02 | Stop reason: SDUPTHER

## 2019-12-03 RX ORDER — ENALAPRIL MALEATE 20 MG/1
TABLET ORAL
Qty: 90 TABLET | Refills: 1 | Status: SHIPPED | OUTPATIENT
Start: 2019-12-03 | End: 2020-06-22

## 2020-06-22 DIAGNOSIS — I10 ESSENTIAL HYPERTENSION: Chronic | ICD-10-CM

## 2020-06-22 RX ORDER — ENALAPRIL MALEATE 20 MG/1
TABLET ORAL
Qty: 90 TABLET | Refills: 0 | Status: SHIPPED | OUTPATIENT
Start: 2020-06-22 | End: 2020-09-25

## 2020-07-02 DIAGNOSIS — R42 VERTIGO: ICD-10-CM

## 2020-07-02 RX ORDER — MECLIZINE HYDROCHLORIDE 25 MG/1
25 TABLET ORAL 3 TIMES DAILY PRN
Qty: 90 TABLET | Refills: 1 | Status: SHIPPED | OUTPATIENT
Start: 2020-07-02 | End: 2021-04-23

## 2020-08-06 ENCOUNTER — TELEPHONE (OUTPATIENT)
Dept: CASE MANAGEMENT | Facility: OTHER | Age: 70
End: 2020-08-06

## 2020-08-06 NOTE — TELEPHONE ENCOUNTER
Spoke with pt to schedule his Medicare Wellness, he is Scheduled Wednesday Sep 30, and has requested to be placed on the lab schedule Monday so he can get labs done. Can you please schedule this and reach out to the patient?     Thank you

## 2020-08-13 ENCOUNTER — TELEPHONE (OUTPATIENT)
Dept: FAMILY MEDICINE CLINIC | Facility: CLINIC | Age: 70
End: 2020-08-13

## 2020-08-13 DIAGNOSIS — R30.0 DYSURIA: Primary | ICD-10-CM

## 2020-08-13 RX ORDER — CIPROFLOXACIN 500 MG/1
500 TABLET, FILM COATED ORAL 2 TIMES DAILY
Qty: 14 TABLET | Refills: 0 | Status: SHIPPED | OUTPATIENT
Start: 2020-08-13 | End: 2020-09-30

## 2020-08-13 NOTE — TELEPHONE ENCOUNTER
May have cipro 500 bid #[id number]    Caller: Erwin Shah Jr.    Relationship: Self    Best call back number: [phone]    What medication are you requesting: ANTIBIOTIC    What are your current symptoms: ITCHING AND BURNING SENSATION WHILE URINATING    How long have you been experiencing symptoms: ABOUT 4    Have you had these symptoms before:    [x] Yes  [] No    Have you been treated for these symptoms before:   [x] Yes  [] No    If a prescription is needed, what is your preferred pharmacy and phone number: [id number] - [place] - [address] [phone] [facility] [phone]      Additional notes:

## 2020-09-25 DIAGNOSIS — I10 ESSENTIAL HYPERTENSION: Primary | ICD-10-CM

## 2020-09-25 DIAGNOSIS — Z12.5 PROSTATE CANCER SCREENING: ICD-10-CM

## 2020-09-25 DIAGNOSIS — Z79.01 ANTICOAGULATED: ICD-10-CM

## 2020-09-25 DIAGNOSIS — I10 ESSENTIAL HYPERTENSION: Chronic | ICD-10-CM

## 2020-09-25 DIAGNOSIS — E78.2 MIXED HYPERLIPIDEMIA: ICD-10-CM

## 2020-09-25 DIAGNOSIS — Z00.00 WELLNESS EXAMINATION: ICD-10-CM

## 2020-09-25 RX ORDER — ENALAPRIL MALEATE 20 MG/1
20 TABLET ORAL DAILY
Qty: 90 TABLET | Refills: 0 | Status: SHIPPED | OUTPATIENT
Start: 2020-09-25 | End: 2020-12-29

## 2020-09-28 ENCOUNTER — LAB (OUTPATIENT)
Dept: FAMILY MEDICINE CLINIC | Facility: CLINIC | Age: 70
End: 2020-09-28

## 2020-09-28 ENCOUNTER — RESULTS ENCOUNTER (OUTPATIENT)
Dept: FAMILY MEDICINE CLINIC | Facility: CLINIC | Age: 70
End: 2020-09-28

## 2020-09-28 DIAGNOSIS — I10 ESSENTIAL HYPERTENSION: ICD-10-CM

## 2020-09-28 DIAGNOSIS — E78.2 MIXED HYPERLIPIDEMIA: ICD-10-CM

## 2020-09-28 DIAGNOSIS — Z00.00 WELLNESS EXAMINATION: ICD-10-CM

## 2020-09-28 DIAGNOSIS — Z79.01 ANTICOAGULATED: ICD-10-CM

## 2020-09-28 DIAGNOSIS — Z12.5 PROSTATE CANCER SCREENING: ICD-10-CM

## 2020-09-29 LAB
ALBUMIN SERPL-MCNC: 4 G/DL (ref 3.5–5.2)
ALBUMIN/GLOB SERPL: 1.7 G/DL
ALP SERPL-CCNC: 89 U/L (ref 39–117)
ALT SERPL-CCNC: 23 U/L (ref 1–41)
AST SERPL-CCNC: 20 U/L (ref 1–40)
BASOPHILS # BLD AUTO: 0.06 10*3/MM3 (ref 0–0.2)
BASOPHILS NFR BLD AUTO: 0.7 % (ref 0–1.5)
BILIRUB SERPL-MCNC: 0.4 MG/DL (ref 0–1.2)
BUN SERPL-MCNC: 14 MG/DL (ref 8–23)
BUN/CREAT SERPL: 14 (ref 7–25)
CALCIUM SERPL-MCNC: 8.8 MG/DL (ref 8.6–10.5)
CHLORIDE SERPL-SCNC: 104 MMOL/L (ref 98–107)
CHOLEST SERPL-MCNC: 173 MG/DL (ref 0–200)
CO2 SERPL-SCNC: 27.7 MMOL/L (ref 22–29)
CREAT SERPL-MCNC: 1 MG/DL (ref 0.76–1.27)
EOSINOPHIL # BLD AUTO: 0.46 10*3/MM3 (ref 0–0.4)
EOSINOPHIL NFR BLD AUTO: 5.6 % (ref 0.3–6.2)
ERYTHROCYTE [DISTWIDTH] IN BLOOD BY AUTOMATED COUNT: 12.4 % (ref 12.3–15.4)
GLOBULIN SER CALC-MCNC: 2.4 GM/DL
GLUCOSE SERPL-MCNC: 89 MG/DL (ref 65–99)
HCT VFR BLD AUTO: 44.8 % (ref 37.5–51)
HDLC SERPL-MCNC: 54 MG/DL (ref 40–60)
HGB BLD-MCNC: 15.1 G/DL (ref 13–17.7)
IMM GRANULOCYTES # BLD AUTO: 0.02 10*3/MM3 (ref 0–0.05)
IMM GRANULOCYTES NFR BLD AUTO: 0.2 % (ref 0–0.5)
LDLC SERPL CALC-MCNC: 105 MG/DL (ref 0–100)
LYMPHOCYTES # BLD AUTO: 2.49 10*3/MM3 (ref 0.7–3.1)
LYMPHOCYTES NFR BLD AUTO: 30.2 % (ref 19.6–45.3)
MCH RBC QN AUTO: 31.8 PG (ref 26.6–33)
MCHC RBC AUTO-ENTMCNC: 33.7 G/DL (ref 31.5–35.7)
MCV RBC AUTO: 94.3 FL (ref 79–97)
MONOCYTES # BLD AUTO: 0.61 10*3/MM3 (ref 0.1–0.9)
MONOCYTES NFR BLD AUTO: 7.4 % (ref 5–12)
NEUTROPHILS # BLD AUTO: 4.6 10*3/MM3 (ref 1.7–7)
NEUTROPHILS NFR BLD AUTO: 55.9 % (ref 42.7–76)
NRBC BLD AUTO-RTO: 0 /100 WBC (ref 0–0.2)
PLATELET # BLD AUTO: 265 10*3/MM3 (ref 140–450)
POTASSIUM SERPL-SCNC: 4.4 MMOL/L (ref 3.5–5.2)
PROT SERPL-MCNC: 6.4 G/DL (ref 6–8.5)
PSA SERPL-MCNC: 2.5 NG/ML (ref 0–4)
RBC # BLD AUTO: 4.75 10*6/MM3 (ref 4.14–5.8)
SODIUM SERPL-SCNC: 138 MMOL/L (ref 136–145)
TRIGL SERPL-MCNC: 72 MG/DL (ref 0–150)
TSH SERPL DL<=0.005 MIU/L-ACNC: 1.8 UIU/ML (ref 0.27–4.2)
VLDLC SERPL CALC-MCNC: 14.4 MG/DL
WBC # BLD AUTO: 8.24 10*3/MM3 (ref 3.4–10.8)

## 2020-09-30 ENCOUNTER — OFFICE VISIT (OUTPATIENT)
Dept: FAMILY MEDICINE CLINIC | Facility: CLINIC | Age: 70
End: 2020-09-30

## 2020-09-30 VITALS
WEIGHT: 187 LBS | RESPIRATION RATE: 16 BRPM | HEIGHT: 69 IN | SYSTOLIC BLOOD PRESSURE: 136 MMHG | BODY MASS INDEX: 27.7 KG/M2 | DIASTOLIC BLOOD PRESSURE: 72 MMHG | OXYGEN SATURATION: 98 % | TEMPERATURE: 97.5 F | HEART RATE: 91 BPM

## 2020-09-30 DIAGNOSIS — I10 ESSENTIAL HYPERTENSION: Chronic | ICD-10-CM

## 2020-09-30 DIAGNOSIS — J30.9 ALLERGIC RHINITIS, UNSPECIFIED SEASONALITY, UNSPECIFIED TRIGGER: ICD-10-CM

## 2020-09-30 DIAGNOSIS — Z23 FLU VACCINE NEED: ICD-10-CM

## 2020-09-30 DIAGNOSIS — L57.0 ACTINIC KERATOSIS: ICD-10-CM

## 2020-09-30 DIAGNOSIS — Z00.00 WELLNESS EXAMINATION: ICD-10-CM

## 2020-09-30 DIAGNOSIS — Z79.01 ANTICOAGULATED: ICD-10-CM

## 2020-09-30 DIAGNOSIS — N40.0 PROSTATISM: ICD-10-CM

## 2020-09-30 DIAGNOSIS — Z23 IMMUNIZATION DUE: Primary | ICD-10-CM

## 2020-09-30 DIAGNOSIS — E78.2 MIXED HYPERLIPIDEMIA: Chronic | ICD-10-CM

## 2020-09-30 DIAGNOSIS — K21.9 GASTROESOPHAGEAL REFLUX DISEASE, ESOPHAGITIS PRESENCE NOT SPECIFIED: Chronic | ICD-10-CM

## 2020-09-30 DIAGNOSIS — L98.9 SKIN LESION: ICD-10-CM

## 2020-09-30 PROCEDURE — 99213 OFFICE O/P EST LOW 20 MIN: CPT | Performed by: FAMILY MEDICINE

## 2020-09-30 PROCEDURE — G0439 PPPS, SUBSEQ VISIT: HCPCS | Performed by: FAMILY MEDICINE

## 2020-09-30 PROCEDURE — G0008 ADMIN INFLUENZA VIRUS VAC: HCPCS | Performed by: FAMILY MEDICINE

## 2020-09-30 PROCEDURE — 90471 IMMUNIZATION ADMIN: CPT | Performed by: FAMILY MEDICINE

## 2020-09-30 PROCEDURE — 96160 PT-FOCUSED HLTH RISK ASSMT: CPT | Performed by: FAMILY MEDICINE

## 2020-09-30 PROCEDURE — 90715 TDAP VACCINE 7 YRS/> IM: CPT | Performed by: FAMILY MEDICINE

## 2020-09-30 PROCEDURE — 90694 VACC AIIV4 NO PRSRV 0.5ML IM: CPT | Performed by: FAMILY MEDICINE

## 2020-09-30 RX ORDER — FLUOROURACIL 50 MG/G
CREAM TOPICAL 2 TIMES DAILY
Qty: 40 G | Refills: 0 | Status: SHIPPED | OUTPATIENT
Start: 2020-09-30 | End: 2021-11-01

## 2020-09-30 NOTE — PROGRESS NOTES
Subjective   Erwin Shah Jr. is a 69 y.o. male presenting with chief complaint of:   Chief Complaint   Patient presents with   • Medicare Wellness-subsequent   • Immunization due       History of Present Illness :  Alone.       Has multiple chronic problems to consider that might have a bearing on today's issues;  an interval appointment.       Chronic/acute problems reviewed today:   1. Immunization due- chronic intermittent need to review his vaccination status   2. Skin lesion chronic ongoing occasional areas of roughness on the surface primarily of his face.  3 weeks of 5-FU causes mild irritation the lesion has so far resolved.   3. Actinic keratosis same   4. Flu vaccine need same   5. Essential hypertension Chronic/stable. Stable here past/no recent home blood pressures.  No significant chest pain, SOB, LE edema, orthopnea, near syncope, dizziness/light headness.   Recent Vitals       4/30/2018 3/27/2019 9/30/2020       BP:  136/70  110/78  136/72     Pulse:  68  74  91     Temp:  98.1 °F (36.7 °C)  98.4 °F (36.9 °C)  97.5 °F (36.4 °C)     Weight:  83.5 kg (184 lb)  88 kg (194 lb)  84.8 kg (187 lb)     BMI (Calculated):  27.2  28.6  27.6            6. Mixed hyperlipidemia Chronic/stable: prefers no statin.  Prefers lifestyle over Rx;  with diet-exercise advised and lab moitored.     7. Gastroesophageal reflux disease, esophagitis presence not specified Chronic/stable.  Controlled heartburn, reflux without dysphagia, melena.  Rx used, periods not used proven needed with symptoms -currently doing ok.      8. Allergic rhinitis, unspecified seasonality, unspecified trigger Chronic/variable.   On/off eye, sinus, nasal congestion and drainage.  Rx helps.  Aware of options additional medications, testing and not interested.     9. Prostatism Chronic/stable.  Slower but tolerated stream with complete emptying.  Nocturia on occ; tolerated.  No desire to persure evaluations/surgery.      10. Wellness examination-done  Chronic ongoing over time screening or advice for general health care/wellness.      11. Anticoagulated prevention+DJD/(), ASA 81x2 Chronic/stable reason and use of.  Denies bleeding issues; especially epistaxis, melena, hematochezia.  Upper arms/others do not bruise easily.  No significant bleeding or falls.        Has an/another acute issue today: None.    The following portions of the patient's history were reviewed and updated as appropriate: allergies, current medications, past family history, past medical history, past social history, past surgical history and problem list.      Current Outpatient Medications:   •  aspirin  MG tablet, Take 325 mg by mouth Daily. Takes 2 tablets daily. , Disp: , Rfl:   •  enalapril (VASOTEC) 20 MG tablet, Take 1 tablet by mouth Daily. MUST KEEP APPT, Disp: 90 tablet, Rfl: 0  •  Loratadine (CLARITIN) 10 MG capsule, Take 1 capsule by mouth Daily., Disp: , Rfl:   •  meclizine (ANTIVERT) 25 MG tablet, Take 1 tablet by mouth 3 (Three) Times a Day As Needed for Dizziness., Disp: 90 tablet, Rfl: 1  •  fluorouracil (Efudex) 5 % cream, Apply  topically to the appropriate area as directed 2 (Two) Times a Day., Disp: 40 g, Rfl: 0  Occ Imitrex    No problems with medications.  Refills if needed done    No Known Allergies    Review of Systems  GENERAL:  Active/slower with limits, speed, stamina for age; minimal influence. Sleep is ok; apnea denied. No fever now.  SKIN: No rash/skin lesion of concern:  ENDO:  No syncope, near or diaphoretic sweaty spells.  HEENT: No recent head injury; or headache.  No vision change.   No significant hearing loss.  Ears without pain/drainage.  No sore throat.  Occ on/off nasal/sinus congestion/drainage. No epistaxis.  CHEST: No chest wall tenderness or mass. No cough,  without wheeze.  No SOB; no hemoptysis.  CV: No chest pain, palpitations, ankle edema.  GI: No heartburn, dysphagia.  No abdominal pain, diarrhea, constipation.  No rectal  bleeding, or melena.    :  Voids without dysuria, or  incontinence to completion.    ORTHO: No painful/swollen joints but various on /off sore.  No change occ sore neck or back.  No acute neck or back pain without recent injury.  NEURO: No dizziness, weakness of extremities.  No numbness/paresthesias.   PSYCH: No memory loss.  Mood good; not that anxious, depressed but/and not suicidal.  Tries to tolerate stress .   Screening:  Mammogram: NA  Bone density: NA  Low dose CT chest: non-smoker/NA  GI:   EGD+biop/[facility]/Shiben/5.7.10/3y  Colon-polyp/[facility]/Shieben/7.31.17/5y  Prostate: 9.30.20 AUA 4  3.27.19 AUA 2/1  Usual lab order  12m CBC, CMP, LIPID, TSH, PSAs    Lab Results:  Results for orders placed or performed in visit on 09/28/20   Comprehensive metabolic panel    Specimen: Blood   Result Value Ref Range    Glucose 89 65 - 99 mg/dL    BUN 14 8 - 23 mg/dL    Creatinine 1.00 0.76 - 1.27 mg/dL    eGFR Non African Am 75 >60 mL/min/1.73    eGFR African Am 90 >60 mL/min/1.73    BUN/Creatinine Ratio 14.0 7.0 - 25.0    Sodium 138 136 - 145 mmol/L    Potassium 4.4 3.5 - 5.2 mmol/L    Chloride 104 98 - 107 mmol/L    Total CO2 27.7 22.0 - 29.0 mmol/L    Calcium 8.8 8.6 - 10.5 mg/dL    Total Protein 6.4 6.0 - 8.5 g/dL    Albumin 4.00 3.50 - 5.20 g/dL    Globulin 2.4 gm/dL    A/G Ratio 1.7 g/dL    Total Bilirubin 0.4 0.0 - 1.2 mg/dL    Alkaline Phosphatase 89 39 - 117 U/L    AST (SGOT) 20 1 - 40 U/L    ALT (SGPT) 23 1 - 41 U/L   Lipid panel    Specimen: Blood   Result Value Ref Range    Total Cholesterol 173 0 - 200 mg/dL    Triglycerides 72 0 - 150 mg/dL    HDL Cholesterol 54 40 - 60 mg/dL    VLDL Cholesterol Remy 14.4 mg/dL    LDL Chol Calc ([facility]) 105 (H) 0 - 100 mg/dL   TSH    Specimen: Blood   Result Value Ref Range    TSH 1.800 0.270 - 4.200 uIU/mL   PSA Screen    Specimen: Blood   Result Value Ref Range    PSA 2.500 0.000 - 4.000 ng/mL   CBC & Differential    Specimen: Blood   Result Value Ref Range    WBC 8.24 3.40 - 10.80  "10*3/mm3    RBC 4.75 4.14 - 5.80 10*6/mm3    Hemoglobin 15.1 13.0 - 17.7 g/dL    Hematocrit 44.8 37.5 - 51.0 %    MCV 94.3 79.0 - 97.0 fL    MCH 31.8 26.6 - 33.0 pg    MCHC 33.7 31.5 - 35.7 g/dL    RDW 12.4 12.3 - 15.4 %    Platelets 265 140 - 450 10*3/mm3    Neutrophil Rel % 55.9 42.7 - 76.0 %    Lymphocyte Rel % 30.2 19.6 - 45.3 %    Monocyte Rel % 7.4 5.0 - 12.0 %    Eosinophil Rel % 5.6 0.3 - 6.2 %    Basophil Rel % 0.7 0.0 - 1.5 %    Neutrophils Absolute 4.60 1.70 - 7.00 10*3/mm3    Lymphocytes Absolute 2.49 0.70 - 3.10 10*3/mm3    Monocytes Absolute 0.61 0.10 - 0.90 10*3/mm3    Eosinophils Absolute 0.46 (H) 0.00 - 0.40 10*3/mm3    Basophils Absolute 0.06 0.00 - 0.20 10*3/mm3    Immature Granulocyte Rel % 0.2 0.0 - 0.5 %    Immature Grans Absolute 0.02 0.00 - 0.05 10*3/mm3    nRBC 0.0 0.0 - 0.2 /100 WBC       A1C:No results for input(s): HGBA1C in the last 37413 hours.  PSA:  Lab Results - Last 18 Months   Lab Units 09/28/20  0747   PSA ng/mL 2.500     Component      Latest Ref Rng & Units 4/3/2017 4/27/2018 9/28/2020   PSA      0.000 - 4.000 ng/mL 2.490 1.920 2.500       CBC:  Lab Results - Last 18 Months   Lab Units 09/28/20  0747   WBC 10*3/mm3 8.24   HEMOGLOBIN g/dL 15.1   HEMATOCRIT % 44.8   PLATELETS 10*3/mm3 265      BMP/CMP:  Lab Results - Last 18 Months   Lab Units 09/28/20  0747   SODIUM mmol/L 138   POTASSIUM mmol/L 4.4   CHLORIDE mmol/L 104   TOTAL CO2 mmol/L 27.7   GLUCOSE mg/dL 89   BUN mg/dL 14   CREATININE mg/dL 1.00   EGFR IF NONAFRICN AM mL/min/1.73 75   EGFR IF AFRICN AM mL/min/1.73 90   CALCIUM mg/dL 8.8     HEPATIC:  Lab Results - Last 18 Months   Lab Units 09/28/20  0747   ALT (SGPT) U/L 23   AST (SGOT) U/L 20   ALK PHOS U/L 89     THYROID:  Lab Results - Last 18 Months   Lab Units 09/28/20  0747   TSH uIU/mL 1.800       Objective   /72   Pulse 91   Temp 97.5 °F (36.4 °C)   Resp 16   Ht 175.3 cm (69\")   Wt 84.8 kg (187 lb)   SpO2 98%   BMI 27.62 kg/m²   Body mass index is " 27.62 kg/m².    Recent Vitals       4/30/2018 3/27/2019 9/30/2020       BP:  136/70  110/78  136/72     Pulse:  68  74  91     Temp:  98.1 °F (36.7 °C)  98.4 °F (36.9 °C)  97.5 °F (36.4 °C)     Weight:  83.5 kg (184 lb)  88 kg (194 lb)  84.8 kg (187 lb)     BMI (Calculated):  27.2  28.6  27.6           Physical Exam  GENERAL:  Well nourished/developed in no acute distress.   SKIN: Turgor excellent, without wound, rash, lesion.   HEENT: Normal cephalic without trauma.  Pupils equal round reactive to light. Extraocular motions full without nystagmus.   External canals nonobstructive nontender without reddness. Tymphatic membranes roque with iveth structures intact.   Oral cavity without growths, exudates, and moist.  Posterior pharynx without mass, obstruction, redness.  No thyromegaly, mass, tenderness, lymphadenopathy and supple.  CV: Regular rhythm.  No murmur, gallop,  edema. Posterior pulses intact.  No carotid bruits.  CHEST: No chest wall tenderness or mass.   LUNGS: Symmetric motion with clear to auscultation.    ABD: Soft, nontender without mass.   PROSTATE: No visible/palpable lesion/tenderness and anal tone intact/full.  Prostate 20 gm/smooth and nontender.  No rectal mass within reach. Stool on glove brown.   ORTHO: Symmetric extremities without swelling/point tenderness.  Full gross range of motion.  NEURO: CN 2-12 grossly intact.  Symmetric facies and UE/LE. 4/5 strength throughout. 1/4 x bicep knee equal reflexe.  Nonfocal use extremities. Speech clear.  Intact light touch with monofilament, vibratory sensation with tuning fork; equal toes/distal feet.    PSYCH: Oriented x 3.  Pleasant calm, well kept.  Purposeful/directed conservation with intact short/long gross memory.     Assessment/Plan     1. Immunization due    2. Skin lesion    3. Actinic keratosis    4. Flu vaccine need    5. Essential hypertension    6. Mixed hyperlipidemia    7. Gastroesophageal reflux disease, esophagitis presence not  "specified    8. Allergic rhinitis, unspecified seasonality, unspecified trigger    9. Prostatism    10. Wellness examination-done    11. Anticoagulated prevention+DJD/(), ASA 81x2      Discussions:   Advised use Effudex site specific; 3w then stop  Voltaren gel; is OTC -spot treat  Imitrex; as long as ok.     Rx: reviewed/changes:  New Medications Ordered This Visit   Medications   • fluorouracil (Efudex) 5 % cream     Sig: Apply  topically to the appropriate area as directed 2 (Two) Times a Day.     Dispense:  40 g     Refill:  0     LAB/Testing/Referrals: reviewed/orders:   Today:   Orders Placed This Encounter   Procedures   • Tdap Vaccine Greater Than or Equal To 8yo IM   • Fluad Quad 65+ yrs ([id number])     Chronic/recurrent labs above or change to:   same     Body mass index is 27.62 kg/m².  Patient's Body mass index is 27.62 kg/m². BMI is within normal parameters. No follow-up required..      Tobacco use reviewed:    Erwin Shah Jr.  reports that he has never smoked. He has never used smokeless tobacco..     Annual/wellness visit: also/today (see separate note)    Patient Instructions     Medicare/insurances offer certain visits called \"wellness/annual\" that allows for time to deal with and  review the many aspects of \"being well\" that just might not get mentioned during other visits with your doctor through the year.  This includes things like reviews of health screenings (mammograms, various labs),  weight, exercise, vaccines for just a few examples.      In order to help you with this we wish to make you aware of a few things for you to consider:    1. Advanced directives.  These are documents used to help direct your care if your health/situation should reach a point that you cannot make your own decisions.  While it is likely you do not currently have a need for these documents now; it is something that we all might face at any time.   The hand outs you are being given today are simply for you to " review and use to learn more about these documents and consider them as you wish.      2. Vaccines: Certain vaccines are important after age 50, 60, and 65 and some health situations (for example COPD), require even boosters beyond age 65.  We are happy to review with you your vaccine status and vaccines that might be needed for you at this point:      a. Tetanus.   Like anyone this needs to given every 10 years; sooner for/with lacerations/wounds.   Likely when getting this booster it needs to be a tetanus called Tdap (tetanus mixed with diptheria and pertussis).   Years ago you had this vaccine.  We now know we can lose our immunity to pertussis (a part of this vaccine) and run a risk of catching this.  Now only would this make us ill; but more importantly we can spread this to very young children (and for them it can be a much more dangerous illness).   We call this the grandparent vaccine for this reason.     b. Pneumonia (strept).   This comes now with two brands.   It is recommended to take pneumovax first; and a year later take the cousin prevnar.  Even if you have had these before; we need to review when and your current health situation/s as you may need boosters and even recently the CDC has made recent/new recommendations for pneumovax.      c. Shingles.  You do not want to catch shingles.  Though you will recover from this; the pain associated with shingles can be severe.  Even if you have had the now older zostavax, or have had shingles; it is recommended you still get the Shingrix (the new vaccine just available early 2018 shingles vaccine).  A new shingles vaccine (a shot to lower your chance of catching shingles) is now available (shingrix).  This vaccine is the second vaccine created for this purpose; (we have had zostavax for years).  Shingrix provides a much better and longer immunity for shingles than zostavax.  For this and other reasons Shingrix can be started at age 50.  If you have had  zostavax in the past; you can still take Shingrix.      This vaccine is not paid for in a doctor's office by medicare, medicaid and probably most insurances.  Like zostavax; this is covered in drug stores.  This is a vaccine that if you chose to get you need to get at a drug store that gives vaccines (like [facility], [facility] and [facility].      d. Yearly flu vaccine given from September through April each year (there is a special vaccine for those over 65).     e. Travel vaccines:  If you are one to do international travel; be sure and ask us for any particular unusual vaccines you may need.     f.  Miscellaneous:  If you have certain health situations/disease you may need specific/particular vaccines not give to the general public.     The vaccines we have on record for you include:   Immunization History   Administered Date(s) Administered   • Fluad Quad 65+ 09/30/2020   • Fluzone High Dose =>65 Years ([facility] ONLY) 10/01/2018   • Tdap 09/30/2020       If you have record of other vaccines and want them to show in your chart here; please talk to our nurses about having your vaccine record updated.     3. Exercise: regular cardio exercise something everyone should consider and try to do; even if health limitations (ie find that exercise UE/LE/cardio that they can tolerate).   Normal weight a goal for everyone (as we discussed)    4. Healthy diet helpful for weight management, illness prevention.     5. If over 50-screening exams include men PSA/rectal exam, women mammograms, and everyone colonoscopy screening for colon cancer.    6. If you use tobacco of any kind or e-products you should stop. We are providing you some information to consider that could make this process easier.      ##################################      Component      Latest Ref Rng & Units 4/3/2017 4/27/2018 9/28/2020   PSA      0.000 - 4.000 ng/mL 2.490 1.920 2.500     ########################    NSAID  Education/Counseling:  We discussed the risks and benefits of Non-steroidal anti-inflammatories (NSAIDs), which include GI, renal, and cardiovascular toxicity. We discussed the risk for stomach ulcers, and the need to stop the drug or add a PPI if GI symptoms develop. We discussed the increased incidence of hypertension and cardiovascnlar events in patients taking NSAIDs We discussed the role the drugs may play in worsening renal disease, if present.     ########################            Follow up: Return for lab;, Dr Carsno-, 12m;.  Future Appointments   Date Time Provider Department Center   9/27/2021  8:15 AM LAB [facility] None   9/29/2021  9:45 AM Isidro Carson MD [facility] None       Procedures none

## 2020-09-30 NOTE — PATIENT INSTRUCTIONS
"Medicare/insurances offer certain visits called \"wellness/annual\" that allows for time to deal with and  review the many aspects of \"being well\" that just might not get mentioned during other visits with your doctor through the year.  This includes things like reviews of health screenings (mammograms, various labs),  weight, exercise, vaccines for just a few examples.      In order to help you with this we wish to make you aware of a few things for you to consider:    1. Advanced directives.  These are documents used to help direct your care if your health/situation should reach a point that you cannot make your own decisions.  While it is likely you do not currently have a need for these documents now; it is something that we all might face at any time.   The hand outs you are being given today are simply for you to review and use to learn more about these documents and consider them as you wish.      2. Vaccines: Certain vaccines are important after age 50, 60, and 65 and some health situations (for example COPD), require even boosters beyond age 65.  We are happy to review with you your vaccine status and vaccines that might be needed for you at this point:      a. Tetanus.   Like anyone this needs to given every 10 years; sooner for/with lacerations/wounds.   Likely when getting this booster it needs to be a tetanus called Tdap (tetanus mixed with diptheria and pertussis).   Years ago you had this vaccine.  We now know we can lose our immunity to pertussis (a part of this vaccine) and run a risk of catching this.  Now only would this make us ill; but more importantly we can spread this to very young children (and for them it can be a much more dangerous illness).   We call this the grandparent vaccine for this reason.     b. Pneumonia (strept).   This comes now with two brands.   It is recommended to take pneumovax first; and a year later take the cousin prevnar.  Even if you have had these before; we need to " review when and your current health situation/s as you may need boosters and even recently the CDC has made recent/new recommendations for pneumovax.      c. Shingles.  You do not want to catch shingles.  Though you will recover from this; the pain associated with shingles can be severe.  Even if you have had the now older zostavax, or have had shingles; it is recommended you still get the Shingrix (the new vaccine just available early 2018 shingles vaccine).  A new shingles vaccine (a shot to lower your chance of catching shingles) is now available (shingrix).  This vaccine is the second vaccine created for this purpose; (we have had zostavax for years).  Shingrix provides a much better and longer immunity for shingles than zostavax.  For this and other reasons Shingrix can be started at age 50.  If you have had zostavax in the past; you can still take Shingrix.      This vaccine is not paid for in a doctor's office by medicare, medicaid and probably most insurances.  Like zostavax; this is covered in drug stores.  This is a vaccine that if you chose to get you need to get at a drug store that gives vaccines (like [facility], [facility] and [facility]      d. Yearly flu vaccine given from September through April each year (there is a special vaccine for those over 65).     e. Travel vaccines:  If you are one to do international travel; be sure and ask us for any particular unusual vaccines you may need.     f.  Miscellaneous:  If you have certain health situations/disease you may need specific/particular vaccines not give to the general public.     The vaccines we have on record for you include:   Immunization History   Administered Date(s) Administered   • Fluad Quad 65+ 09/30/2020   • Fluzone High Dose =>65 Years ([facility] ONLY) 10/01/2018   • Tdap 09/30/2020       If you have record of other vaccines and want them to show in your chart here; please talk to our nurses about having your vaccine  record updated.     3. Exercise: regular cardio exercise something everyone should consider and try to do; even if health limitations (ie find that exercise UE/LE/cardio that they can tolerate).   Normal weight a goal for everyone (as we discussed)    4. Healthy diet helpful for weight management, illness prevention.     5. If over 50-screening exams include men PSA/rectal exam, women mammograms, and everyone colonoscopy screening for colon cancer.    6. If you use tobacco of any kind or e-products you should stop. We are providing you some information to consider that could make this process easier.      ##################################      Component      Latest Ref Rng & Units 4/3/2017 4/27/2018 9/28/2020   PSA      0.000 - 4.000 ng/mL 2.490 1.920 2.500     ########################    NSAID Education/Counseling:  We discussed the risks and benefits of Non-steroidal anti-inflammatories (NSAIDs), which include GI, renal, and cardiovascular toxicity. We discussed the risk for stomach ulcers, and the need to stop the drug or add a PPI if GI symptoms develop. We discussed the increased incidence of hypertension and cardiovascnlar events in patients taking NSAIDs We discussed the role the drugs may play in worsening renal disease, if present.     ########################

## 2020-09-30 NOTE — PROGRESS NOTES
Pt here for Flu injection as ordered. Given in left deltoid. TDAP injection was given in the right deltoid.  Pt tolerated well, both VIS's were given and no adverse reactions noted and pt left office.

## 2020-11-15 ENCOUNTER — TELEMEDICINE (OUTPATIENT)
Dept: FAMILY MEDICINE CLINIC | Facility: CLINIC | Age: 70
End: 2020-11-15

## 2020-11-15 DIAGNOSIS — Z20.822 EXPOSURE TO COVID-19 VIRUS: Primary | ICD-10-CM

## 2020-11-15 PROCEDURE — 99442 PR PHYS/QHP TELEPHONE EVALUATION 11-20 MIN: CPT | Performed by: FAMILY MEDICINE

## 2020-11-15 NOTE — PROGRESS NOTES
Subjective   Erwin Shah Jr. is a 69 y.o. male presenting with chief complaint of:   Chief Complaint   Patient presents with   • Exposure To Known Illness   You have chosen to receive care through a telephone visit. Do you consent to use a telephone visit for your medical care today? Yes    This visit has been rescheduled as a phone visit to comply with patient safety concerns in accordance with CDC recommendations. Total time of discussion was 12 minutes.      History of Present Illness :  Alone.  Here for primarily an acute issue today; ? Needs COVID tested.       Exposure date: 11.7.20 (day 8) /wife friend.  /wife exposed to a son 11.5.20 and /wife was alerted son was + 11.14.20-they are neg and plans for retest.     Exposure place: restaurant/home  Exposure without mast  Length of exposure: > 15 min  Symptoms positive: none  Symptoms neg: fever, cough, SOB, loss taste/smell, fatigue, HA, body ache  Date of first symptom: none  Date of test: none     Site of test: none     Test back NA    Test result NA  Advice: Social distancing-with A+ efficiency  Work: NA  Call if symptoms  Call if /wife call with +     Has multiple chronic problems to consider that might have a bearing on today's issues; not an interval appointment.       Chronic/acute problems reviewed today:   1. Exposure to COVID-19 virus      Has an/another acute issue today: none.    The following portions of the patient's history were reviewed and updated as appropriate: allergies, current medications, past family history, past medical history, past social history, past surgical history and problem list.      Current Outpatient Medications:   •  aspirin  MG tablet, Take 325 mg by mouth Daily. Takes 2 tablets daily. , Disp: , Rfl:   •  enalapril (VASOTEC) 20 MG tablet, Take 1 tablet by mouth Daily. MUST KEEP APPT, Disp: 90 tablet, Rfl: 0  •  fluorouracil (Efudex) 5 % cream, Apply  topically to the appropriate area as  directed 2 (Two) Times a Day., Disp: 40 g, Rfl: 0  •  Loratadine (CLARITIN) 10 MG capsule, Take 1 capsule by mouth Daily., Disp: , Rfl:   •  meclizine (ANTIVERT) 25 MG tablet, Take 1 tablet by mouth 3 (Three) Times a Day As Needed for Dizziness., Disp: 90 tablet, Rfl: 1    No problems with medications.  Refills if needed done    No Known Allergies    Review of Systems   Constitutional: Negative for activity change, appetite change, chills, fatigue and fever.   HENT: Negative for sore throat.    Respiratory: Negative for cough, shortness of breath and wheezing.    Gastrointestinal: Negative for diarrhea and nausea.     Lab Results:  Results for orders placed or performed in visit on 09/28/20   Comprehensive metabolic panel    Specimen: Blood   Result Value Ref Range    Glucose 89 65 - 99 mg/dL    BUN 14 8 - 23 mg/dL    Creatinine 1.00 0.76 - 1.27 mg/dL    eGFR Non African Am 75 >60 mL/min/1.73    eGFR African Am 90 >60 mL/min/1.73    BUN/Creatinine Ratio 14.0 7.0 - 25.0    Sodium 138 136 - 145 mmol/L    Potassium 4.4 3.5 - 5.2 mmol/L    Chloride 104 98 - 107 mmol/L    Total CO2 27.7 22.0 - 29.0 mmol/L    Calcium 8.8 8.6 - 10.5 mg/dL    Total Protein 6.4 6.0 - 8.5 g/dL    Albumin 4.00 3.50 - 5.20 g/dL    Globulin 2.4 gm/dL    A/G Ratio 1.7 g/dL    Total Bilirubin 0.4 0.0 - 1.2 mg/dL    Alkaline Phosphatase 89 39 - 117 U/L    AST (SGOT) 20 1 - 40 U/L    ALT (SGPT) 23 1 - 41 U/L   Lipid panel    Specimen: Blood   Result Value Ref Range    Total Cholesterol 173 0 - 200 mg/dL    Triglycerides 72 0 - 150 mg/dL    HDL Cholesterol 54 40 - 60 mg/dL    VLDL Cholesterol Remy 14.4 mg/dL    LDL Chol Calc (NIH) 105 (H) 0 - 100 mg/dL   TSH    Specimen: Blood   Result Value Ref Range    TSH 1.800 0.270 - 4.200 uIU/mL   PSA Screen    Specimen: Blood   Result Value Ref Range    PSA 2.500 0.000 - 4.000 ng/mL   CBC & Differential    Specimen: Blood   Result Value Ref Range    WBC 8.24 3.40 - 10.80 10*3/mm3    RBC 4.75 4.14 - 5.80  10*6/mm3    Hemoglobin 15.1 13.0 - 17.7 g/dL    Hematocrit 44.8 37.5 - 51.0 %    MCV 94.3 79.0 - 97.0 fL    MCH 31.8 26.6 - 33.0 pg    MCHC 33.7 31.5 - 35.7 g/dL    RDW 12.4 12.3 - 15.4 %    Platelets 265 140 - 450 10*3/mm3    Neutrophil Rel % 55.9 42.7 - 76.0 %    Lymphocyte Rel % 30.2 19.6 - 45.3 %    Monocyte Rel % 7.4 5.0 - 12.0 %    Eosinophil Rel % 5.6 0.3 - 6.2 %    Basophil Rel % 0.7 0.0 - 1.5 %    Neutrophils Absolute 4.60 1.70 - 7.00 10*3/mm3    Lymphocytes Absolute 2.49 0.70 - 3.10 10*3/mm3    Monocytes Absolute 0.61 0.10 - 0.90 10*3/mm3    Eosinophils Absolute 0.46 (H) 0.00 - 0.40 10*3/mm3    Basophils Absolute 0.06 0.00 - 0.20 10*3/mm3    Immature Granulocyte Rel % 0.2 0.0 - 0.5 %    Immature Grans Absolute 0.02 0.00 - 0.05 10*3/mm3    nRBC 0.0 0.0 - 0.2 /100 WBC       A1C:No results for input(s): HGBA1C in the last 89380 hours.  PSA:  Lab Results - Last 18 Months   Lab Units 09/28/20  0747   PSA ng/mL 2.500     CBC:  Lab Results - Last 18 Months   Lab Units 09/28/20  0747   WBC 10*3/mm3 8.24   HEMOGLOBIN g/dL 15.1   HEMATOCRIT % 44.8   PLATELETS 10*3/mm3 265      BMP/CMP:  Lab Results - Last 18 Months   Lab Units 09/28/20  0747   SODIUM mmol/L 138   POTASSIUM mmol/L 4.4   CHLORIDE mmol/L 104   TOTAL CO2 mmol/L 27.7   GLUCOSE mg/dL 89   BUN mg/dL 14   CREATININE mg/dL 1.00   EGFR IF NONAFRICN AM mL/min/1.73 75   EGFR IF AFRICN AM mL/min/1.73 90   CALCIUM mg/dL 8.8     HEPATIC:  Lab Results - Last 18 Months   Lab Units 09/28/20  0747   ALT (SGPT) U/L 23   AST (SGOT) U/L 20   ALK PHOS U/L 89     THYROID:  Lab Results - Last 18 Months   Lab Units 09/28/20  0747   TSH uIU/mL 1.800       Objective   There were no vitals taken for this visit.  There is no height or weight on file to calculate BMI.    Physical Exam  None; due to telephone/COVID19  Was alert, oriented x 3, pleasant.     Assessment/Plan     1. Exposure to COVID-19 virus      Rx: reviewed/changes:  No orders of the defined types were placed in  this encounter.    LAB/Testing/Referrals: reviewed/orders:   Today: none  No orders of the defined types were placed in this encounter.    Chronic/recurrent labs above or change to:   same     Discussions:   above     There are no Patient Instructions on file for this visit.    Follow up: Return for lab;, Dr Carson-, as planned;.  Future Appointments   Date Time Provider Department Center   9/27/2021  8:15 AM [facility]   9/29/2021  9:45 AM Isidro Carson MD [facility]

## 2020-12-29 DIAGNOSIS — I10 ESSENTIAL HYPERTENSION: Chronic | ICD-10-CM

## 2020-12-29 RX ORDER — ENALAPRIL MALEATE 20 MG/1
20 TABLET ORAL DAILY
Qty: 90 TABLET | Refills: 2 | Status: SHIPPED | OUTPATIENT
Start: 2020-12-29 | End: 2022-03-11

## 2020-12-29 NOTE — TELEPHONE ENCOUNTER
Requested Prescriptions     Pending Prescriptions Disp Refills   • enalapril (VASOTEC) 20 MG tablet [Pharmacy Med Name: ENALAPRIL MALEATE 20 MG TAB] 90 tablet 2     Sig: TAKE 1 TABLET BY MOUTH DAILY. MUST KEEP APPT

## 2021-04-23 DIAGNOSIS — R42 VERTIGO: ICD-10-CM

## 2021-04-23 RX ORDER — MECLIZINE HYDROCHLORIDE 25 MG/1
25 TABLET ORAL 3 TIMES DAILY PRN
Qty: 90 TABLET | Refills: 1 | Status: SHIPPED | OUTPATIENT
Start: 2021-04-23 | End: 2022-06-06

## 2021-06-15 ENCOUNTER — OFFICE VISIT (OUTPATIENT)
Dept: FAMILY MEDICINE CLINIC | Facility: CLINIC | Age: 71
End: 2021-06-15

## 2021-06-15 VITALS
DIASTOLIC BLOOD PRESSURE: 84 MMHG | RESPIRATION RATE: 18 BRPM | OXYGEN SATURATION: 98 % | WEIGHT: 191 LBS | HEART RATE: 63 BPM | HEIGHT: 69 IN | SYSTOLIC BLOOD PRESSURE: 150 MMHG | BODY MASS INDEX: 28.29 KG/M2 | TEMPERATURE: 98.4 F

## 2021-06-15 DIAGNOSIS — I10 ESSENTIAL HYPERTENSION: Chronic | ICD-10-CM

## 2021-06-15 DIAGNOSIS — R03.0 ELEVATED BLOOD PRESSURE READING: ICD-10-CM

## 2021-06-15 DIAGNOSIS — R06.00 DYSPNEA, UNSPECIFIED TYPE: ICD-10-CM

## 2021-06-15 DIAGNOSIS — I49.9 CARDIAC ARRHYTHMIA, UNSPECIFIED CARDIAC ARRHYTHMIA TYPE: Primary | ICD-10-CM

## 2021-06-15 DIAGNOSIS — R00.2 PALPITATIONS: ICD-10-CM

## 2021-06-15 PROBLEM — M15.0 PRIMARY GENERALIZED (OSTEO)ARTHRITIS: Status: ACTIVE | Noted: 2017-04-13

## 2021-06-15 PROCEDURE — 99214 OFFICE O/P EST MOD 30 MIN: CPT | Performed by: FAMILY MEDICINE

## 2021-06-15 NOTE — PROGRESS NOTES
Subjective   Erwin Shah Jr. is a 70 y.o. male presenting with chief complaint of:   Chief Complaint   Patient presents with   • Hypertension     last thursday night 160/95     History of Present Illness :  Alone.  Here for primarily an acute issue; bp up.       Has multiple chronic problems to consider that might have a bearing on today's issues; not an interval appointment.       Chronic/acute problems reviewed today:   1. Essential hypertension: Chronic/unstable. Above targets today/and home blood pressures.  No significant chest pain, SOB, LE edema, orthopnea, near syncope, dizziness/light headness.  Agrees to follow next few days and let us know if stays elevated.   Recent Vitals       3/27/2019 9/30/2020 6/15/2021       BP:  110/78  136/72  150/84     Pulse:  74  91  63     Temp:  98.4 °F (36.9 °C)  97.5 °F (36.4 °C)  98.4 °F (36.9 °C)     Weight:  88 kg (194 lb)  84.8 kg (187 lb)  86.6 kg (191 lb)     BMI (Calculated):  28.6  27.6  28.2            2. Palpitations: chronic/stable without palpitations despite PVC history/exam today.  No syncope/near. Might be candidate for b-blocker   3. Elevated blood pressure reading: acute/above     Has an/another acute issue today: none.    The following portions of the patient's history were reviewed and updated as appropriate: allergies, current medications, past family history, past medical history, past social history, past surgical history and problem list.      Current Outpatient Medications:   •  aspirin  MG tablet, Take 325 mg by mouth Daily. Takes 2 tablets daily. , Disp: , Rfl:   •  enalapril (VASOTEC) 20 MG tablet, TAKE 1 TABLET BY MOUTH DAILY. MUST KEEP APPT, Disp: 90 tablet, Rfl: 2  •  fluorouracil (Efudex) 5 % cream, Apply  topically to the appropriate area as directed 2 (Two) Times a Day., Disp: 40 g, Rfl: 0  •  Loratadine (CLARITIN) 10 MG capsule, Take 1 capsule by mouth Daily., Disp: , Rfl:   •  meclizine (ANTIVERT) 25 MG tablet, TAKE 1 TABLET BY  MOUTH 3 (THREE) TIMES A DAY AS NEEDED FOR DIZZINESS., Disp: 90 tablet, Rfl: 1    No problems with medications.    No Known Allergies    Review of Systems   Constitutional: Negative for activity change, appetite change, fatigue and fever.   Eyes: Negative for pain and visual disturbance.   Respiratory: Negative for cough, shortness of breath and wheezing.    Cardiovascular: Negative for chest pain, palpitations and leg swelling.   Gastrointestinal: Negative for abdominal pain.   Neurological: Positive for headaches (minimal). Negative for facial asymmetry, speech difficulty, weakness and numbness.     Data reviewed:   Last cardiac testing:   Echo: 2014 EST/echo and echo    Lab Results:  Results for orders placed or performed in visit on 09/28/20   Comprehensive metabolic panel    Specimen: Blood   Result Value Ref Range    Glucose 89 65 - 99 mg/dL    BUN 14 8 - 23 mg/dL    Creatinine 1.00 0.76 - 1.27 mg/dL    eGFR Non African Am 75 >60 mL/min/1.73    eGFR African Am 90 >60 mL/min/1.73    BUN/Creatinine Ratio 14.0 7.0 - 25.0    Sodium 138 136 - 145 mmol/L    Potassium 4.4 3.5 - 5.2 mmol/L    Chloride 104 98 - 107 mmol/L    Total CO2 27.7 22.0 - 29.0 mmol/L    Calcium 8.8 8.6 - 10.5 mg/dL    Total Protein 6.4 6.0 - 8.5 g/dL    Albumin 4.00 3.50 - 5.20 g/dL    Globulin 2.4 gm/dL    A/G Ratio 1.7 g/dL    Total Bilirubin 0.4 0.0 - 1.2 mg/dL    Alkaline Phosphatase 89 39 - 117 U/L    AST (SGOT) 20 1 - 40 U/L    ALT (SGPT) 23 1 - 41 U/L   Lipid panel    Specimen: Blood   Result Value Ref Range    Total Cholesterol 173 0 - 200 mg/dL    Triglycerides 72 0 - 150 mg/dL    HDL Cholesterol 54 40 - 60 mg/dL    VLDL Cholesterol Remy 14.4 mg/dL    LDL Chol Calc ([facility]) 105 (H) 0 - 100 mg/dL   TSH    Specimen: Blood   Result Value Ref Range    TSH 1.800 0.270 - 4.200 uIU/mL   PSA Screen    Specimen: Blood   Result Value Ref Range    PSA 2.500 0.000 - 4.000 ng/mL   CBC & Differential    Specimen: Blood   Result Value Ref Range    WBC  "8.24 3.40 - 10.80 10*3/mm3    RBC 4.75 4.14 - 5.80 10*6/mm3    Hemoglobin 15.1 13.0 - 17.7 g/dL    Hematocrit 44.8 37.5 - 51.0 %    MCV 94.3 79.0 - 97.0 fL    MCH 31.8 26.6 - 33.0 pg    MCHC 33.7 31.5 - 35.7 g/dL    RDW 12.4 12.3 - 15.4 %    Platelets 265 140 - 450 10*3/mm3    Neutrophil Rel % 55.9 42.7 - 76.0 %    Lymphocyte Rel % 30.2 19.6 - 45.3 %    Monocyte Rel % 7.4 5.0 - 12.0 %    Eosinophil Rel % 5.6 0.3 - 6.2 %    Basophil Rel % 0.7 0.0 - 1.5 %    Neutrophils Absolute 4.60 1.70 - 7.00 10*3/mm3    Lymphocytes Absolute 2.49 0.70 - 3.10 10*3/mm3    Monocytes Absolute 0.61 0.10 - 0.90 10*3/mm3    Eosinophils Absolute 0.46 (H) 0.00 - 0.40 10*3/mm3    Basophils Absolute 0.06 0.00 - 0.20 10*3/mm3    Immature Granulocyte Rel % 0.2 0.0 - 0.5 %    Immature Grans Absolute 0.02 0.00 - 0.05 10*3/mm3    nRBC 0.0 0.0 - 0.2 /100 WBC       A1C:No results for input(s): HGBA1C in the last 18672 hours.  LIPID:  Lab Results - Last 18 Months   Lab Units 09/28/20  0747   CHOLESTEROL mg/dL 173   LDL CHOL mg/dL 105*   HDL CHOL mg/dL 54   TRIGLYCERIDES mg/dL 72     PSA:  Lab Results - Last 18 Months   Lab Units 09/28/20  0747   PSA ng/mL 2.500     CBC:  Lab Results - Last 18 Months   Lab Units 09/28/20  0747   WBC 10*3/mm3 8.24   HEMOGLOBIN g/dL 15.1   HEMATOCRIT % 44.8   PLATELETS 10*3/mm3 265      BMP/CMP:  Lab Results - Last 18 Months   Lab Units 09/28/20  0747   SODIUM mmol/L 138   POTASSIUM mmol/L 4.4   CHLORIDE mmol/L 104   TOTAL CO2 mmol/L 27.7   GLUCOSE mg/dL 89   BUN mg/dL 14   CREATININE mg/dL 1.00   EGFR IF NONAFRICN AM mL/min/1.73 75   EGFR IF AFRICN AM mL/min/1.73 90   CALCIUM mg/dL 8.8     HEPATIC:  Lab Results - Last 18 Months   Lab Units 09/28/20  0747   ALT (SGPT) U/L 23   AST (SGOT) U/L 20   ALK PHOS U/L 89     THYROID:  Lab Results - Last 18 Months   Lab Units 09/28/20  0747   TSH uIU/mL 1.800       Objective   /84   Pulse 63   Temp 98.4 °F (36.9 °C) (Infrared)   Resp 18   Ht 175.3 cm (69\")   Wt 86.6 kg " (191 lb)   SpO2 98%   BMI 28.21 kg/m²   Body mass index is 28.21 kg/m².    Recent Vitals       3/27/2019 9/30/2020 6/15/2021       BP:  110/78  136/72  150/84     Pulse:  74  91  63     Temp:  98.4 °F (36.9 °C)  97.5 °F (36.4 °C)  98.4 °F (36.9 °C)     Weight:  88 kg (194 lb)  84.8 kg (187 lb)  86.6 kg (191 lb)     BMI (Calculated):  28.6  27.6  28.2           Physical Exam  GENERAL:  Well nourished/developed in no acute distress.   SKIN: Turgor excellent, without wound, rash, lesion.   HEENT: Normal cephalic without trauma.  Pupils equal round reactive to light. Extraocular motions full without nystagmus.   External canals nonobstructive nontender without reddness. Tymphatic membranes roque with iveth structures intact.   Oral cavity without growths, exudates, and moist.  Posterior pharynx without mass, obstruction, redness.  No thyromegaly, mass, tenderness, lymphadenopathy and supple.  CV: Regular rhythm but frequent ectopy/unifocal with occ pause.  No murmur, gallop,  edema. Posterior pulses intact.  No carotid bruits.  CHEST: No chest wall tenderness or mass.   LUNGS: Symmetric motion with clear to auscultation.    ABD: Soft, nontender without mass.   ORTHO: Symmetric extremities without swelling/point tenderness.  Full gross range of motion.  NEURO: CN 2-12 grossly intact.  Symmetric facies and UE/LE. 4/5 strength throughout. 1/4 x bicep knee equal reflexe.  Nonfocal use extremities. Speech clear.  Intact light touch with monofilament, vibratory sensation with tuning fork; equal toes/distal feet.    PSYCH: Oriented x 3.  Pleasant calm, well kept.  Purposeful/directed conservation with intact short/long gross memory.     Assessment/Plan     1. Cardiac arrhythmia, unspecified cardiac arrhythmia type    2. Essential hypertension    3. Palpitations    4. Elevated blood pressure reading    5. Dyspnea, unspecified type      Discussion:  Holding new Rx/change  holter  Est/echo  Echo  Lab acute today    Medical  decision issues:   Data review above:   Rx: reviewed and decisions:   Same Rx for now   Visit today involved significant illness consideration/intensive drug monitoring: ie potential to cause serious morbidity or death:   No orders of the defined types were placed in this encounter.    Orders placed:   LAB/Testing/Referrals: reviewed/orders:   Today:   Orders Placed This Encounter   Procedures   • Comprehensive Metabolic Panel   • TSH   • Holter Monitor - 48 Hour   • Adult Transthoracic Echo Complete W/ Cont if Necessary Per Protocol   • CBC & Differential     Chronic/recurrent labs above or change to:   same    Health maintenance:   Body mass index is 28.21 kg/m².  Patient's Body mass index is 28.21 kg/m². indicating that he is overweight (BMI 25-29.9). Obesity-related health conditions include the following: hypertension. Obesity is unchanged. BMI is is above average; BMI management plan is completed. We discussed portion control and increasing exercise..    Tobacco use reviewed:    Erwin Shah JrGloria  reports that he has never smoked. He has never used smokeless tobacco..    There are no Patient Instructions on file for this visit.    Follow up: Return for lab;, Dr Carson-, as planned;, THEN, will see how testing/or treatment goes and decide;.  Future Appointments   Date Time Provider Department Center   6/17/2021  1:00 PM [facility] ECHO ROOM 1  [facility]   6/18/2021  1:00 PM [facility] HOLTER ROOM  [facility]   6/22/2021  3:00 PM [facility] STRESS LAB 3  [facility]   9/27/2021  8:15 AM [facility]   9/29/2021  9:45 AM Isidro Carson MD [facility]

## 2021-06-16 LAB
ALBUMIN SERPL-MCNC: 4.2 G/DL (ref 3.5–5.2)
ALBUMIN/GLOB SERPL: 1.5 G/DL
ALP SERPL-CCNC: 97 U/L (ref 39–117)
ALT SERPL-CCNC: 16 U/L (ref 1–41)
AST SERPL-CCNC: 15 U/L (ref 1–40)
BASOPHILS # BLD AUTO: 0.08 10*3/MM3 (ref 0–0.2)
BASOPHILS NFR BLD AUTO: 0.7 % (ref 0–1.5)
BILIRUB SERPL-MCNC: 0.8 MG/DL (ref 0–1.2)
BUN SERPL-MCNC: 12 MG/DL (ref 8–23)
BUN/CREAT SERPL: 12.9 (ref 7–25)
CALCIUM SERPL-MCNC: 8.9 MG/DL (ref 8.6–10.5)
CHLORIDE SERPL-SCNC: 102 MMOL/L (ref 98–107)
CO2 SERPL-SCNC: 27.3 MMOL/L (ref 22–29)
CREAT SERPL-MCNC: 0.93 MG/DL (ref 0.76–1.27)
EOSINOPHIL # BLD AUTO: 0.13 10*3/MM3 (ref 0–0.4)
EOSINOPHIL NFR BLD AUTO: 1.2 % (ref 0.3–6.2)
ERYTHROCYTE [DISTWIDTH] IN BLOOD BY AUTOMATED COUNT: 12.4 % (ref 12.3–15.4)
GLOBULIN SER CALC-MCNC: 2.8 GM/DL
GLUCOSE SERPL-MCNC: 86 MG/DL (ref 65–99)
HCT VFR BLD AUTO: 41.8 % (ref 37.5–51)
HGB BLD-MCNC: 14.1 G/DL (ref 13–17.7)
IMM GRANULOCYTES # BLD AUTO: 0.06 10*3/MM3 (ref 0–0.05)
IMM GRANULOCYTES NFR BLD AUTO: 0.5 % (ref 0–0.5)
LYMPHOCYTES # BLD AUTO: 1.93 10*3/MM3 (ref 0.7–3.1)
LYMPHOCYTES NFR BLD AUTO: 17.1 % (ref 19.6–45.3)
MCH RBC QN AUTO: 32.1 PG (ref 26.6–33)
MCHC RBC AUTO-ENTMCNC: 33.7 G/DL (ref 31.5–35.7)
MCV RBC AUTO: 95.2 FL (ref 79–97)
MONOCYTES # BLD AUTO: 0.96 10*3/MM3 (ref 0.1–0.9)
MONOCYTES NFR BLD AUTO: 8.5 % (ref 5–12)
NEUTROPHILS # BLD AUTO: 8.11 10*3/MM3 (ref 1.7–7)
NEUTROPHILS NFR BLD AUTO: 72 % (ref 42.7–76)
NRBC BLD AUTO-RTO: 0 /100 WBC (ref 0–0.2)
PLATELET # BLD AUTO: 242 10*3/MM3 (ref 140–450)
POTASSIUM SERPL-SCNC: 4.1 MMOL/L (ref 3.5–5.2)
PROT SERPL-MCNC: 7 G/DL (ref 6–8.5)
RBC # BLD AUTO: 4.39 10*6/MM3 (ref 4.14–5.8)
SODIUM SERPL-SCNC: 136 MMOL/L (ref 136–145)
TSH SERPL DL<=0.005 MIU/L-ACNC: 1.05 UIU/ML (ref 0.27–4.2)
WBC # BLD AUTO: 11.27 10*3/MM3 (ref 3.4–10.8)

## 2021-06-17 ENCOUNTER — HOSPITAL ENCOUNTER (OUTPATIENT)
Dept: CARDIOLOGY | Facility: HOSPITAL | Age: 71
Discharge: HOME | End: 2021-06-17
Admitting: FAMILY MEDICINE

## 2021-06-17 VITALS
SYSTOLIC BLOOD PRESSURE: 150 MMHG | HEIGHT: 69 IN | BODY MASS INDEX: 28.29 KG/M2 | DIASTOLIC BLOOD PRESSURE: 84 MMHG | WEIGHT: 191 LBS

## 2021-06-17 DIAGNOSIS — R06.00 DYSPNEA, UNSPECIFIED TYPE: ICD-10-CM

## 2021-06-17 DIAGNOSIS — R00.2 PALPITATIONS: ICD-10-CM

## 2021-06-17 DIAGNOSIS — I49.9 CARDIAC ARRHYTHMIA, UNSPECIFIED CARDIAC ARRHYTHMIA TYPE: ICD-10-CM

## 2021-06-17 DIAGNOSIS — R03.0 ELEVATED BLOOD PRESSURE READING: ICD-10-CM

## 2021-06-17 DIAGNOSIS — I10 ESSENTIAL HYPERTENSION: ICD-10-CM

## 2021-06-17 PROCEDURE — 93306 TTE W/DOPPLER COMPLETE: CPT

## 2021-06-17 PROCEDURE — 93306 TTE W/DOPPLER COMPLETE: CPT | Performed by: INTERNAL MEDICINE

## 2021-06-18 ENCOUNTER — HOSPITAL ENCOUNTER (OUTPATIENT)
Dept: CARDIOLOGY | Facility: HOSPITAL | Age: 71
Discharge: HOME | End: 2021-06-18
Admitting: FAMILY MEDICINE

## 2021-06-18 DIAGNOSIS — I10 ESSENTIAL HYPERTENSION: ICD-10-CM

## 2021-06-18 DIAGNOSIS — R00.2 PALPITATIONS: ICD-10-CM

## 2021-06-18 DIAGNOSIS — R06.00 DYSPNEA, UNSPECIFIED TYPE: ICD-10-CM

## 2021-06-18 DIAGNOSIS — R03.0 ELEVATED BLOOD PRESSURE READING: ICD-10-CM

## 2021-06-18 DIAGNOSIS — I49.9 CARDIAC ARRHYTHMIA, UNSPECIFIED CARDIAC ARRHYTHMIA TYPE: ICD-10-CM

## 2021-06-18 LAB
BH CV ECHO MEAS - AO MAX PG (FULL): 9 MMHG
BH CV ECHO MEAS - AO MAX PG: 17.1 MMHG
BH CV ECHO MEAS - AO MEAN PG (FULL): 2 MMHG
BH CV ECHO MEAS - AO MEAN PG: 6 MMHG
BH CV ECHO MEAS - AO ROOT AREA (BSA CORRECTED): 1.4
BH CV ECHO MEAS - AO ROOT AREA: 6.2 CM^2
BH CV ECHO MEAS - AO ROOT DIAM: 2.8 CM
BH CV ECHO MEAS - AO V2 MAX: 207 CM/SEC
BH CV ECHO MEAS - AO V2 MEAN: 108.8 CM/SEC
BH CV ECHO MEAS - AO V2 VTI: 35.4 CM
BH CV ECHO MEAS - AVA(I,A): 3.1 CM^2
BH CV ECHO MEAS - AVA(I,D): 3.1 CM^2
BH CV ECHO MEAS - AVA(V,A): 2.4 CM^2
BH CV ECHO MEAS - AVA(V,D): 2.4 CM^2
BH CV ECHO MEAS - BSA(HAYCOCK): 2.1 M^2
BH CV ECHO MEAS - BSA: 2 M^2
BH CV ECHO MEAS - BZI_BMI: 28.2 KILOGRAMS/M^2
BH CV ECHO MEAS - BZI_METRIC_HEIGHT: 175.3 CM
BH CV ECHO MEAS - BZI_METRIC_WEIGHT: 86.6 KG
BH CV ECHO MEAS - EDV(CUBED): 131.9 ML
BH CV ECHO MEAS - EDV(MOD-SP4): 100 ML
BH CV ECHO MEAS - EDV(TEICH): 123.2 ML
BH CV ECHO MEAS - EF(CUBED): 81.9 %
BH CV ECHO MEAS - EF(MOD-SP4): 53 %
BH CV ECHO MEAS - EF(TEICH): 74.3 %
BH CV ECHO MEAS - ESV(CUBED): 23.9 ML
BH CV ECHO MEAS - ESV(MOD-SP4): 47 ML
BH CV ECHO MEAS - ESV(TEICH): 31.7 ML
BH CV ECHO MEAS - FS: 43.4 %
BH CV ECHO MEAS - IVS/LVPW: 0.95
BH CV ECHO MEAS - IVSD: 0.88 CM
BH CV ECHO MEAS - LA DIMENSION: 3.9 CM
BH CV ECHO MEAS - LA/AO: 1.4
BH CV ECHO MEAS - LAT PEAK E' VEL: 9.3 CM/SEC
BH CV ECHO MEAS - LV DIASTOLIC VOL/BSA (35-75): 49.4 ML/M^2
BH CV ECHO MEAS - LV MASS(C)D: 164.1 GRAMS
BH CV ECHO MEAS - LV MASS(C)DI: 81 GRAMS/M^2
BH CV ECHO MEAS - LV MAX PG: 8.1 MMHG
BH CV ECHO MEAS - LV MEAN PG: 4 MMHG
BH CV ECHO MEAS - LV SYSTOLIC VOL/BSA (12-30): 23.2 ML/M^2
BH CV ECHO MEAS - LV V1 MAX: 140.7 CM/SEC
BH CV ECHO MEAS - LV V1 MEAN: 93 CM/SEC
BH CV ECHO MEAS - LV V1 VTI: 32.1 CM
BH CV ECHO MEAS - LVIDD: 5.1 CM
BH CV ECHO MEAS - LVIDS: 2.9 CM
BH CV ECHO MEAS - LVLD AP4: 7.4 CM
BH CV ECHO MEAS - LVLS AP4: 6.3 CM
BH CV ECHO MEAS - LVOT AREA (M): 3.5 CM^2
BH CV ECHO MEAS - LVOT AREA: 3.5 CM^2
BH CV ECHO MEAS - LVOT DIAM: 2.1 CM
BH CV ECHO MEAS - LVPWD: 0.93 CM
BH CV ECHO MEAS - MED PEAK E' VEL: 9.14 CM/SEC
BH CV ECHO MEAS - MR MAX PG: 70.2 MMHG
BH CV ECHO MEAS - MR MAX VEL: 419 CM/SEC
BH CV ECHO MEAS - MV A MAX VEL: 99.5 CM/SEC
BH CV ECHO MEAS - MV DEC SLOPE: 236 CM/SEC^2
BH CV ECHO MEAS - MV DEC TIME: 0.31 SEC
BH CV ECHO MEAS - MV E MAX VEL: 112 CM/SEC
BH CV ECHO MEAS - MV E/A: 1.1
BH CV ECHO MEAS - MV MAX PG: 5.9 MMHG
BH CV ECHO MEAS - MV MEAN PG: 3 MMHG
BH CV ECHO MEAS - MV P1/2T MAX VEL: 109 CM/SEC
BH CV ECHO MEAS - MV P1/2T: 135.3 MSEC
BH CV ECHO MEAS - MV V2 MAX: 121 CM/SEC
BH CV ECHO MEAS - MV V2 MEAN: 76.4 CM/SEC
BH CV ECHO MEAS - MV V2 VTI: 37.9 CM
BH CV ECHO MEAS - MVA P1/2T LCG: 2 CM^2
BH CV ECHO MEAS - MVA(P1/2T): 1.6 CM^2
BH CV ECHO MEAS - MVA(VTI): 2.9 CM^2
BH CV ECHO MEAS - PA MAX PG: 4.2 MMHG
BH CV ECHO MEAS - PA MEAN PG: 2 MMHG
BH CV ECHO MEAS - PA V2 MAX: 103 CM/SEC
BH CV ECHO MEAS - PA V2 MEAN: 70.5 CM/SEC
BH CV ECHO MEAS - PA V2 VTI: 23.5 CM
BH CV ECHO MEAS - PI END-D VEL: 145 CM/SEC
BH CV ECHO MEAS - RAP SYSTOLE: 10 MMHG
BH CV ECHO MEAS - RVSP: 50.7 MMHG
BH CV ECHO MEAS - SI(AO): 107.7 ML/M^2
BH CV ECHO MEAS - SI(CUBED): 53.3 ML/M^2
BH CV ECHO MEAS - SI(LVOT): 54.9 ML/M^2
BH CV ECHO MEAS - SI(MOD-SP4): 26.2 ML/M^2
BH CV ECHO MEAS - SI(TEICH): 45.2 ML/M^2
BH CV ECHO MEAS - SV(AO): 218.2 ML
BH CV ECHO MEAS - SV(CUBED): 108 ML
BH CV ECHO MEAS - SV(LVOT): 111.2 ML
BH CV ECHO MEAS - SV(MOD-SP4): 53 ML
BH CV ECHO MEAS - SV(TEICH): 91.6 ML
BH CV ECHO MEAS - TR MAX VEL: 319 CM/SEC
BH CV ECHO MEASUREMENTS AVERAGE E/E' RATIO: 12.15
LEFT ATRIUM VOLUME INDEX: 28.6 ML/M2
LEFT ATRIUM VOLUME: 58.1 CM3

## 2021-06-18 PROCEDURE — 93225 XTRNL ECG REC<48 HRS REC: CPT

## 2021-06-22 ENCOUNTER — HOSPITAL ENCOUNTER (OUTPATIENT)
Dept: CARDIOLOGY | Facility: HOSPITAL | Age: 71
Discharge: HOME | End: 2021-06-22
Admitting: FAMILY MEDICINE

## 2021-06-22 VITALS
HEIGHT: 69 IN | WEIGHT: 190.92 LBS | SYSTOLIC BLOOD PRESSURE: 147 MMHG | HEART RATE: 60 BPM | DIASTOLIC BLOOD PRESSURE: 70 MMHG | BODY MASS INDEX: 28.28 KG/M2

## 2021-06-22 DIAGNOSIS — R06.00 DYSPNEA, UNSPECIFIED TYPE: ICD-10-CM

## 2021-06-22 DIAGNOSIS — I10 ESSENTIAL HYPERTENSION: ICD-10-CM

## 2021-06-22 DIAGNOSIS — R03.0 ELEVATED BLOOD PRESSURE READING: ICD-10-CM

## 2021-06-22 DIAGNOSIS — R00.2 PALPITATIONS: ICD-10-CM

## 2021-06-22 DIAGNOSIS — I49.9 CARDIAC ARRHYTHMIA, UNSPECIFIED CARDIAC ARRHYTHMIA TYPE: ICD-10-CM

## 2021-06-22 PROCEDURE — 93018 CV STRESS TEST I&R ONLY: CPT | Performed by: EMERGENCY MEDICINE

## 2021-06-22 PROCEDURE — 93350 STRESS TTE ONLY: CPT

## 2021-06-22 PROCEDURE — 25010000002 PERFLUTREN 6.52 MG/ML SUSPENSION: Performed by: EMERGENCY MEDICINE

## 2021-06-22 PROCEDURE — 93350 STRESS TTE ONLY: CPT | Performed by: EMERGENCY MEDICINE

## 2021-06-22 PROCEDURE — 93352 ADMIN ECG CONTRAST AGENT: CPT | Performed by: EMERGENCY MEDICINE

## 2021-06-22 PROCEDURE — 93017 CV STRESS TEST TRACING ONLY: CPT

## 2021-06-22 RX ADMIN — PERFLUTREN 8.48 MG: 6.52 INJECTION, SUSPENSION INTRAVENOUS at 15:46

## 2021-06-23 LAB
BH CV STRESS BP STAGE 1: NORMAL
BH CV STRESS BP STAGE 2: NORMAL
BH CV STRESS BP STAGE 3: NORMAL
BH CV STRESS DURATION MIN STAGE 1: 3
BH CV STRESS DURATION MIN STAGE 2: 3
BH CV STRESS DURATION MIN STAGE 3: 3
BH CV STRESS DURATION SEC STAGE 1: 0
BH CV STRESS DURATION SEC STAGE 2: 0
BH CV STRESS DURATION SEC STAGE 3: 0
BH CV STRESS GRADE STAGE 1: 10
BH CV STRESS GRADE STAGE 2: 12
BH CV STRESS GRADE STAGE 3: 14
BH CV STRESS HR STAGE 1: 79
BH CV STRESS HR STAGE 2: 98
BH CV STRESS HR STAGE 3: 129
BH CV STRESS METS STAGE 1: 5
BH CV STRESS METS STAGE 2: 7.5
BH CV STRESS METS STAGE 3: 10
BH CV STRESS PROTOCOL 1: NORMAL
BH CV STRESS RECOVERY BP: NORMAL MMHG
BH CV STRESS RECOVERY HR: 74 BPM
BH CV STRESS SPEED STAGE 1: 1.7
BH CV STRESS SPEED STAGE 2: 2.5
BH CV STRESS SPEED STAGE 3: 3.4
BH CV STRESS STAGE 1: 1
BH CV STRESS STAGE 2: 2
BH CV STRESS STAGE 3: 3
MAXIMAL PREDICTED HEART RATE: 152 BPM
PERCENT MAX PREDICTED HR: 84.87 %
STRESS BASELINE BP: NORMAL MMHG
STRESS BASELINE HR: 65 BPM
STRESS PERCENT HR: 100 %
STRESS POST ESTIMATED WORKLOAD: 10 METS
STRESS POST EXERCISE DUR MIN: 9 MIN
STRESS POST EXERCISE DUR SEC: 0 SEC
STRESS POST PEAK BP: NORMAL MMHG
STRESS POST PEAK HR: 129 BPM
STRESS TARGET HR: 129 BPM

## 2021-06-29 PROBLEM — I49.9 CARDIAC ARRHYTHMIA: Status: ACTIVE | Noted: 2021-06-29

## 2021-06-29 PROBLEM — R00.2 PALPITATIONS: Status: RESOLVED | Noted: 2017-03-11 | Resolved: 2021-06-29

## 2021-06-29 PROBLEM — I51.89 DIASTOLIC DYSFUNCTION: Status: ACTIVE | Noted: 2021-06-29

## 2021-06-29 LAB
MAXIMAL PREDICTED HEART RATE: 152 BPM
STRESS TARGET HR: 129 BPM

## 2021-06-29 PROCEDURE — 93227 XTRNL ECG REC<48 HR R&I: CPT | Performed by: INTERNAL MEDICINE

## 2021-06-30 ENCOUNTER — TELEPHONE (OUTPATIENT)
Dept: FAMILY MEDICINE CLINIC | Facility: CLINIC | Age: 71
End: 2021-06-30

## 2021-06-30 NOTE — TELEPHONE ENCOUNTER
He is doing ok; I talked to him  His PVCs are there but with a neg EST test not worrisome right now

## 2021-06-30 NOTE — TELEPHONE ENCOUNTER
----- Message from Bev Grijalva MA sent at 6/30/2021  3:28 PM CDT -----  What do you want me to relay to patient about the test result itself?

## 2021-11-01 ENCOUNTER — OFFICE VISIT (OUTPATIENT)
Dept: FAMILY MEDICINE CLINIC | Facility: CLINIC | Age: 71
End: 2021-11-01

## 2021-11-01 VITALS
SYSTOLIC BLOOD PRESSURE: 126 MMHG | HEART RATE: 78 BPM | TEMPERATURE: 98 F | DIASTOLIC BLOOD PRESSURE: 72 MMHG | BODY MASS INDEX: 29.18 KG/M2 | WEIGHT: 197 LBS | HEIGHT: 69 IN | OXYGEN SATURATION: 95 % | RESPIRATION RATE: 18 BRPM

## 2021-11-01 DIAGNOSIS — M15.0 PRIMARY GENERALIZED (OSTEO)ARTHRITIS: ICD-10-CM

## 2021-11-01 DIAGNOSIS — K21.9 GASTROESOPHAGEAL REFLUX DISEASE, UNSPECIFIED WHETHER ESOPHAGITIS PRESENT: Chronic | ICD-10-CM

## 2021-11-01 DIAGNOSIS — I10 PRIMARY HYPERTENSION: Chronic | ICD-10-CM

## 2021-11-01 DIAGNOSIS — Z00.00 WELLNESS EXAMINATION: ICD-10-CM

## 2021-11-01 DIAGNOSIS — Z79.01 ANTICOAGULATED: ICD-10-CM

## 2021-11-01 DIAGNOSIS — I49.9 CARDIAC ARRHYTHMIA, UNSPECIFIED CARDIAC ARRHYTHMIA TYPE: ICD-10-CM

## 2021-11-01 DIAGNOSIS — J30.9 ALLERGIC RHINITIS, UNSPECIFIED SEASONALITY, UNSPECIFIED TRIGGER: ICD-10-CM

## 2021-11-01 DIAGNOSIS — Z12.5 ENCOUNTER FOR PROSTATE CANCER SCREENING: ICD-10-CM

## 2021-11-01 DIAGNOSIS — E78.2 MIXED HYPERLIPIDEMIA: Chronic | ICD-10-CM

## 2021-11-01 PROCEDURE — 99214 OFFICE O/P EST MOD 30 MIN: CPT | Performed by: FAMILY MEDICINE

## 2021-11-01 PROCEDURE — 1126F AMNT PAIN NOTED NONE PRSNT: CPT | Performed by: FAMILY MEDICINE

## 2021-11-01 PROCEDURE — 1170F FXNL STATUS ASSESSED: CPT | Performed by: FAMILY MEDICINE

## 2021-11-01 PROCEDURE — 96160 PT-FOCUSED HLTH RISK ASSMT: CPT | Performed by: FAMILY MEDICINE

## 2021-11-01 PROCEDURE — G0439 PPPS, SUBSEQ VISIT: HCPCS | Performed by: FAMILY MEDICINE

## 2021-11-01 PROCEDURE — 1160F RVW MEDS BY RX/DR IN RCRD: CPT | Performed by: FAMILY MEDICINE

## 2021-11-01 NOTE — PROGRESS NOTES
Subjective   Erwin Shah Jr. is a 70 y.o. male presenting with chief complaint of:   Chief Complaint   Patient presents with   • Medicare Wellness-subsequent       History of Present Illness :  Alone.  Here for primarily a review of his chronic problems particularly his blood pressure and also to complete his wellness exam.  He duval in [place] leaving in the next 2 weeks.       Has multiple chronic problems to consider that might have a bearing on today's issues;  an interval appointment.       Chronic/acute problems reviewed today:   1. Allergic rhinitis, unspecified seasonality, unspecified trigger Chronic/stable.   On/off eye, sinus, nasal congestion and drainage.  Rx helps.  Aware of options additional medications, testing and not interested.     2. Primary hypertension Chronic/stable. Stable here past/and no home blood pressures.  No significant chest pain, SOB, LE edema, orthopnea, near syncope, dizziness/light headness.   Recent Vitals       6/17/2021 6/22/2021 11/1/2021       BP: 150/84 147/70 126/72     Pulse: -- 60 78     Temp: -- -- 98 °F (36.7 °C)     Weight: 86.6 kg (191 lb) 86.6 kg (190 lb 14.7 oz) 89.4 kg (197 lb)     BMI (Calculated): 28.2 28.2 29.1            3. Mixed hyperlipidemia Chronic/stable: prefers no statin.  Prefers lifestyle over Rx;  with diet-exercise advised and lab moitored.     4. Cardiac arrhythmia, unspecified cardiac arrhythmia type Chronic/stable palpitation.   History of palpitations/pvcs benign.  Rhythm currently seems controlled.  Medications seem tolerated.  No syncope near syncope.     5. Anticoagulated prevention+DJD/(), ASA 81x2 Chronic/stable reason and use of.  Denies bleeding issues; especially epistaxis, melena, hematochezia.  Upper arms/others do not significantly bruise easily.  No significant bleeding or falls.      6. Gastroesophageal reflux disease, unspecified whether esophagitis present Chronic/stable.  Controlled heartburn, reflux without dysphagia,  melena.  Rx used, periods not used proven needed with symptoms -currently doing ok.      7. Wellness examination-done Chronic ongoing over time screening or advice for general health care/wellness.      8. Primary generalized (osteo)arthritis Chronic/stable.  Various on/off joint pains/soreness/stiffness.  Particular joint problems with knees.  No joint swelling.  Treats mainly with reduced activity, Rx listed, Tylenol. Active No pattern NSAIDs, and no current injections.      9. Encounter for prostate cancer screening chronic ongoing need to review his risk for prostate cancers particularly as he ages     Has an/another acute issue today: none.    The following portions of the patient's history were reviewed and updated as appropriate: allergies, current medications, past family history, past medical history, past social history, past surgical history and problem list.      Current Outpatient Medications:   •  aspirin  MG tablet, Take 325 mg by mouth Daily., Disp: , Rfl:   •  enalapril (VASOTEC) 20 MG tablet, TAKE 1 TABLET BY MOUTH DAILY. MUST KEEP APPT, Disp: 90 tablet, Rfl: 2  •  Loratadine (CLARITIN) 10 MG capsule, Take 1 capsule by mouth Daily., Disp: , Rfl:   •  meclizine (ANTIVERT) 25 MG tablet, TAKE 1 TABLET BY MOUTH 3 (THREE) TIMES A DAY AS NEEDED FOR DIZZINESS., Disp: 90 tablet, Rfl: 1    No problems with medications.    No Known Allergies    Review of Systems  GENERAL:  Active/slower with limits, speed, stamina for age; minimal influence. Sleep is ok; apnea denied. No fever now.  SKIN: No rash/skin lesion of concern:  ENDO:  No syncope, near or diaphoretic sweaty spells.  HEENT: No recent head injury; or headache.  No vision change.   No significant hearing loss.  Ears without pain/drainage.  No sore throat.  Occ on/off nasal/sinus congestion/drainage. No epistaxis.  CHEST: No chest wall tenderness or mass. No cough,  without wheeze.  No SOB; no hemoptysis.  CV: No chest pain, palpitations, ankle  edema.  GI: No heartburn, dysphagia.  No abdominal pain, diarrhea, constipation.  No rectal bleeding, or melena.    :  Voids without dysuria, or  incontinence to completion.    ORTHO: No painful/swollen joints but various on /off sore.  No change occ sore neck or back.  No acute neck or back pain without recent injury.  NEURO: No dizziness, weakness of extremities.  No numbness/paresthesias.   PSYCH: No memory loss.  Mood good; not that anxious, depressed but/and not suicidal.  Tries to tolerate stress .   Screening:  Mammogram: NA  Bone density: NA  Low dose CT chest: non-smoker/NA  GI:   EGD+biop/[facility]/Shiben/5.7.10/3y  Colon-polyp/[facility]/Shieben/7.31.17/5y  Prostate: 11.1.21 AUA same  9.30.20 AUA 4  3.27.19 AUA 2/1  Usual lab order  12m CBC, CMP, LIPID, TSH, PSAs     Data reviewed:   Last cardiac testing:   Echo:   Results for orders placed during the hospital encounter of 06/22/21    Adult Stress Echo W/ Cont or Stress Agent if Necessary Per Protocol    Interpretation Summary  · Agosto treadmill score of 9  · No ischemic changes during stress  · Frequent PVCs noted throughout the exam  · No clinical symptoms of ischemia during stress  · Excellent functional capacity at 10 metabolic equivalents  · Appropriate blood pressure and heart rate response to stress  · Segment augmentation had a normal response to stress. Cavity size behaved normally in response to stress.  · Normal stress echo consistent with a low risk study for myocardial ischemia.  · Overall this is a low risk test for ischemia. Frequent PVCs noted throughout the entire exam.        Lab Results:  Results for orders placed or performed during the hospital encounter of 06/22/21   Adult Stress Echo W/ Cont or Stress Agent if Necessary Per Protocol   Result Value Ref Range    Target HR (85%) 129 bpm    Max. Pred. HR (100%) 152 bpm     CV STRESS PROTOCOL 1 Jefry     Stage 1 1     HR Stage 1 79     BP Stage 1 111/78     Duration Min Stage 1 3     Duration Sec  Stage 1 0     Grade Stage 1 10     Speed Stage 1 1.7     BH CV STRESS METS STAGE 1 5     Stage 2 2     HR Stage 2 98     BP Stage 2 183/79     Duration Min Stage 2 3     Duration Sec Stage 2 0     Grade Stage 2 12     Speed Stage 2 2.5     BH CV STRESS METS STAGE 2 7.5     Stage 3 3     HR Stage 3 129     BP Stage 3 168/86     Duration Min Stage 3 3     Duration Sec Stage 3 0     Grade Stage 3 14     Speed Stage 3 3.4     BH CV STRESS METS STAGE 3 10.0     Baseline HR 65 bpm    Baseline /70 mmHg    Peak  bpm    Percent Max Pred HR 84.87 %    Percent Target  %    Peak /86 mmHg    Recovery HR 74 bpm    Recovery /67 mmHg    Exercise duration (min) 9 min    Exercise duration (sec) 0 sec    Estimated workload 10.0 METS       A1C:No results for input(s): HGBA1C in the last 30389 hours.  LIPID:  Lab Results - Last 18 Months   Lab Units 09/28/20  0747   CHOLESTEROL mg/dL 173   LDL CHOL mg/dL 105*   HDL CHOL mg/dL 54   TRIGLYCERIDES mg/dL 72     PSA:  Lab Results - Last 18 Months   Lab Units 09/28/20  0747   PSA ng/mL 2.500     CBC:  Lab Results - Last 18 Months   Lab Units 06/15/21  1327 09/28/20  0747   WBC 10*3/mm3 11.27* 8.24   HEMOGLOBIN g/dL 14.1 15.1   HEMATOCRIT % 41.8 44.8   PLATELETS 10*3/mm3 242 265      BMP/CMP:  Lab Results - Last 18 Months   Lab Units 06/15/21  1327 09/28/20  0747   SODIUM mmol/L 136 138   POTASSIUM mmol/L 4.1 4.4   CHLORIDE mmol/L 102 104   TOTAL CO2 mmol/L 27.3 27.7   BUN mg/dL 12 14   CREATININE mg/dL 0.93 1.00   EGFR IF NONAFRICN AM mL/min/1.73 81 75   EGFR IF AFRICN AM mL/min/1.73 98 90   CALCIUM mg/dL 8.9 8.8     HEPATIC:  Lab Results - Last 18 Months   Lab Units 06/15/21  1327 09/28/20  0747   ALT (SGPT) U/L 16 23   AST (SGOT) U/L 15 20   ALK PHOS U/L 97 89     THYROID:  Lab Results - Last 18 Months   Lab Units 06/15/21  1327 09/28/20  0747   TSH uIU/mL 1.050 1.800       Objective   /72   Pulse 78   Temp 98 °F (36.7 °C) (Infrared)   Resp 18   Ht  "175.3 cm (69\")   Wt 89.4 kg (197 lb)   SpO2 95%   BMI 29.09 kg/m²   Body mass index is 29.09 kg/m².    Recent Vitals       6/17/2021 6/22/2021 11/1/2021       BP: 150/84 147/70 126/72     Pulse: -- 60 78     Temp: -- -- 98 °F (36.7 °C)     Weight: 86.6 kg (191 lb) 86.6 kg (190 lb 14.7 oz) 89.4 kg (197 lb)     BMI (Calculated): 28.2 28.2 29.1           Physical Exam  GENERAL:  Well nourished/developed in no acute distress.   SKIN: Turgor excellent, without wound, rash, lesion.   HEENT: Normal cephalic without trauma.  Pupils equal round reactive to light. Extraocular motions full without nystagmus.   External canals nonobstructive nontender without reddness. Tymphatic membranes roque with iveth structures intact.   Oral cavity without growths, exudates, and moist.  Posterior pharynx without mass, obstruction, redness.  No thyromegaly, mass, tenderness, lymphadenopathy and supple.  CV: Regular rhythm.  No murmur, gallop,  edema. Posterior pulses intact.  No carotid bruits.  CHEST: No chest wall tenderness or mass.   LUNGS: Symmetric motion with clear to auscultation.    ABD: Soft, nontender without mass.   PROSTATE: No visible/palpable lesion/tenderness and anal tone intact/full.  Prostate 20 gm/smooth and nontender.  No rectal mass within reach. Stool on glove brown.   ORTHO: Symmetric extremities without swelling/point tenderness.  Full gross range of motion.  NEURO: CN 2-12 grossly intact.  Symmetric facies and UE/LE. 4/5 strength throughout. 1/4 x bicep knee equal reflexe.  Nonfocal use extremities. Speech clear.  Intact light touch with monofilament, vibratory sensation with tuning fork; equal toes/distal feet.    PSYCH: Oriented x 3.  Pleasant calm, well kept.  Purposeful/directed conservation with intact short/long gross memory.       Assessment/Plan     1. Allergic rhinitis, unspecified seasonality, unspecified trigger    2. Primary hypertension    3. Mixed hyperlipidemia    4. Cardiac arrhythmia, " "unspecified cardiac arrhythmia type    5. Anticoagulated prevention+DJD/(), ASA 81x2    6. Gastroesophageal reflux disease, unspecified whether esophagitis present    7. Wellness examination-done    8. Primary generalized (osteo)arthritis    9. Encounter for prostate cancer screening      Discussion:  Goals for blood pressure reviewed\  Report any syncope near syncope with his history of PVCs and any chest pain  Avoid over-the-counter decongestants  Infrequent use of nonsteroidals is safer than more use    Wellness done also  Vaccine reviewed: today none; later (30 days after recent pneumonia) covid booster and later shinglex  Screening reviewed/updated    Medical decision issues:   Data review above:   Rx: reviewed and decisions:   Same Rx for now     Visit today involved chronic significant medical problems or differentials and/or intensive drug monitoring: ie potential to cause serious morbidity or death:   No orders of the defined types were placed in this encounter.      Orders placed:   LAB/Testing/Referrals: reviewed/orders:   Today:   Orders Placed This Encounter   Procedures   • Comprehensive metabolic panel   • Lipid Panel With LDL/HDL Ratio   • TSH   • PSA Screen   • CBC and Differential     Chronic/recurrent labs above or change to:   same     Health maintenance:   Body mass index is 29.09 kg/m².  Patient's Body mass index is 29.09 kg/m². indicating that he is overweight (BMI 25-29.9). Obesity-related health conditions include the following: hypertension. Obesity is unchanged. BMI is is above average; BMI management plan is completed. We discussed portion control and increasing exercise..      Tobacco use reviewed:   Erwin Shah Jr.  reports that he has never smoked. He has never used smokeless tobacco..     Annual/wellness visit: also/today (see separate note)-medicare     Patient Instructions     Medicare/insurances offer certain visits called \"wellness/annual\" that allows for time to deal with " "and  review the many aspects of \"being well\" that just might not get mentioned during other visits with your doctor through the year.  This includes things like reviews of health screenings (mammograms, various labs),  weight, exercise, vaccines for just a few examples.      In order to help you with this we wish to make you aware of a few things for you to consider:    1. Advanced directives.  These are documents used to help direct your care if your health/situation should reach a point that you cannot make your own decisions.  While it is likely you do not currently have a need for these documents now; it is something that we all might face at any time.   The hand outs you are being given today are simply for you to review and use to learn more about these documents and consider them as you wish.      2. Vaccines: Certain vaccines are important after age 50, 60, and 65 and some health situations (for example COPD), require even boosters beyond age 65.  We are happy to review with you your vaccine status and vaccines that might be needed for you at this point:      a. Tetanus.   Like anyone this needs to given every 10 years; sooner for/with lacerations/wounds.   Likely when getting this booster it needs to be a tetanus called Tdap (tetanus mixed with diptheria and pertussis).   Years ago you had this vaccine.  We now know we can lose our immunity to pertussis (a part of this vaccine) and run a risk of catching this.  Now only would this make us ill; but more importantly we can spread this to very young children (and for them it can be a much more dangerous illness).   We call this the grandparent vaccine for this reason.     b. Pneumonia (strept).   This comes now with two brands.   It is recommended to take pneumovax first; and a year later take the cousin prevnar.  Even if you have had these before; we need to review when and your current health situation/s as you may need boosters and even recently the CDC has " made recent/new recommendations for pneumovax.      c. Shingles.  You do not want to catch shingles.  Though you will recover from this; the pain associated with shingles can be severe.  Even if you have had the now older zostavax, or have had shingles; it is recommended you still get the Shingrix (the new vaccine just available early 2018 shingles vaccine).  A new shingles vaccine (a shot to lower your chance of catching shingles) is now available (shingrix).  This vaccine is the second vaccine created for this purpose; (we have had zostavax for years).  Shingrix provides a much better and longer immunity for shingles than zostavax.  For this and other reasons Shingrix can be started at age 50.  If you have had zostavax in the past; you can still take Shingrix.      This vaccine is not paid for in a doctor's office by medicare, medicaid and probably most insurances.  Like zostavax; this is covered in drug stores.  This is a vaccine that if you chose to get you need to get at a drug store that gives vaccines (like [facility], [facility] and [employer] [facility].      d. Yearly flu vaccine given from September through April each year (there is a special vaccine for those over 65).     e. Travel vaccines:  If you are one to do international travel; be sure and ask us for any particular unusual vaccines you may need.     f.  Miscellaneous:  If you have certain health situations/disease you may need specific/particular vaccines not give to the general public.     The vaccines we have on record for you include:   Immunization History   Administered Date(s) Administered   • COVID-19 (MODERNA) 03/18/2021, 04/15/2021   • Fluad Quad 65+ 09/30/2020   • Fluzone High Dose =>65 Years ([facility] ONLY) 10/01/2018   • Td 04/14/2008   • Tdap 09/30/2020       If you have record of other vaccines and want them to show in your chart here; please talk to our nurses about having your vaccine record updated.     3. Exercise: regular  cardio exercise something everyone should consider and try to do; even if health limitations (ie find that exercise UE/LE/cardio that they can tolerate).   Normal weight a goal for everyone (as we discussed)    4. Healthy diet helpful for weight management, illness prevention.     5. If over 50-screening exams include men PSA/rectal exam, women mammograms, and everyone colonoscopy screening for colon cancer.    6. If you use tobacco of any kind or e-products you should stop. We are providing you some information to consider that could make this process easier.      ##################################              Follow up: Return for lab today/, THEN, lab;, Dr Carson-12+ months.  Future Appointments   Date Time Provider Department Center   11/19/2021 11:30 AM Elli Kenney APRN [facility]   11/7/2022  9:00 AM Isidro Carson MD [facility]

## 2021-11-01 NOTE — PATIENT INSTRUCTIONS
"Medicare/insurances offer certain visits called \"wellness/annual\" that allows for time to deal with and  review the many aspects of \"being well\" that just might not get mentioned during other visits with your doctor through the year.  This includes things like reviews of health screenings (mammograms, various labs),  weight, exercise, vaccines for just a few examples.      In order to help you with this we wish to make you aware of a few things for you to consider:    1. Advanced directives.  These are documents used to help direct your care if your health/situation should reach a point that you cannot make your own decisions.  While it is likely you do not currently have a need for these documents now; it is something that we all might face at any time.   The hand outs you are being given today are simply for you to review and use to learn more about these documents and consider them as you wish.      2. Vaccines: Certain vaccines are important after age 50, 60, and 65 and some health situations (for example COPD), require even boosters beyond age 65.  We are happy to review with you your vaccine status and vaccines that might be needed for you at this point:      a. Tetanus.   Like anyone this needs to given every 10 years; sooner for/with lacerations/wounds.   Likely when getting this booster it needs to be a tetanus called Tdap (tetanus mixed with diptheria and pertussis).   Years ago you had this vaccine.  We now know we can lose our immunity to pertussis (a part of this vaccine) and run a risk of catching this.  Now only would this make us ill; but more importantly we can spread this to very young children (and for them it can be a much more dangerous illness).   We call this the grandparent vaccine for this reason.     b. Pneumonia (strept).   This comes now with two brands.   It is recommended to take pneumovax first; and a year later take the cousin prevnar.  Even if you have had these before; we need to " review when and your current health situation/s as you may need boosters and even recently the CDC has made recent/new recommendations for pneumovax.      c. Shingles.  You do not want to catch shingles.  Though you will recover from this; the pain associated with shingles can be severe.  Even if you have had the now older zostavax, or have had shingles; it is recommended you still get the Shingrix (the new vaccine just available early 2018 shingles vaccine).  A new shingles vaccine (a shot to lower your chance of catching shingles) is now available (shingrix).  This vaccine is the second vaccine created for this purpose; (we have had zostavax for years).  Shingrix provides a much better and longer immunity for shingles than zostavax.  For this and other reasons Shingrix can be started at age 50.  If you have had zostavax in the past; you can still take Shingrix.      This vaccine is not paid for in a doctor's office by medicare, medicaid and probably most insurances.  Like zostavax; this is covered in drug stores.  This is a vaccine that if you chose to get you need to get at a drug store that gives vaccines (like [facility], [facility] and [employer].      d. Yearly flu vaccine given from September through April each year (there is a special vaccine for those over 65).     e. Travel vaccines:  If you are one to do international travel; be sure and ask us for any particular unusual vaccines you may need.     f.  Miscellaneous:  If you have certain health situations/disease you may need specific/particular vaccines not give to the general public.     The vaccines we have on record for you include:   Immunization History   Administered Date(s) Administered   • COVID-19 (MODERNA) 03/18/2021, 04/15/2021   • Fluad Quad 65+ 09/30/2020   • Fluzone High Dose =>65 Years ([facility] ONLY) 10/01/2018   • Td 04/14/2008   • Tdap 09/30/2020       If you have record of other vaccines and want them to show in your  chart here; please talk to our nurses about having your vaccine record updated.     3. Exercise: regular cardio exercise something everyone should consider and try to do; even if health limitations (ie find that exercise UE/LE/cardio that they can tolerate).   Normal weight a goal for everyone (as we discussed)    4. Healthy diet helpful for weight management, illness prevention.     5. If over 50-screening exams include men PSA/rectal exam, women mammograms, and everyone colonoscopy screening for colon cancer.    6. If you use tobacco of any kind or e-products you should stop. We are providing you some information to consider that could make this process easier.      ##################################

## 2021-11-02 LAB
ALBUMIN SERPL-MCNC: 4.2 G/DL (ref 3.5–5.2)
ALBUMIN/GLOB SERPL: 1.6 G/DL
ALP SERPL-CCNC: 94 U/L (ref 39–117)
ALT SERPL-CCNC: 26 U/L (ref 1–41)
AST SERPL-CCNC: 21 U/L (ref 1–40)
BASOPHILS # BLD AUTO: 0.09 10*3/MM3 (ref 0–0.2)
BASOPHILS NFR BLD AUTO: 1.3 % (ref 0–1.5)
BILIRUB SERPL-MCNC: 0.6 MG/DL (ref 0–1.2)
BUN SERPL-MCNC: 12 MG/DL (ref 8–23)
BUN/CREAT SERPL: 13 (ref 7–25)
CALCIUM SERPL-MCNC: 9.2 MG/DL (ref 8.6–10.5)
CHLORIDE SERPL-SCNC: 105 MMOL/L (ref 98–107)
CHOLEST SERPL-MCNC: 183 MG/DL (ref 0–200)
CO2 SERPL-SCNC: 28.3 MMOL/L (ref 22–29)
CREAT SERPL-MCNC: 0.92 MG/DL (ref 0.76–1.27)
EOSINOPHIL # BLD AUTO: 0.28 10*3/MM3 (ref 0–0.4)
EOSINOPHIL NFR BLD AUTO: 4 % (ref 0.3–6.2)
ERYTHROCYTE [DISTWIDTH] IN BLOOD BY AUTOMATED COUNT: 12.9 % (ref 12.3–15.4)
GLOBULIN SER CALC-MCNC: 2.7 GM/DL
GLUCOSE SERPL-MCNC: 91 MG/DL (ref 65–99)
HCT VFR BLD AUTO: 43.6 % (ref 37.5–51)
HDLC SERPL-MCNC: 48 MG/DL (ref 40–60)
HGB BLD-MCNC: 14.6 G/DL (ref 13–17.7)
IMM GRANULOCYTES # BLD AUTO: 0.02 10*3/MM3 (ref 0–0.05)
IMM GRANULOCYTES NFR BLD AUTO: 0.3 % (ref 0–0.5)
LDLC SERPL CALC-MCNC: 118 MG/DL (ref 0–100)
LDLC/HDLC SERPL: 2.43 {RATIO}
LYMPHOCYTES # BLD AUTO: 1.98 10*3/MM3 (ref 0.7–3.1)
LYMPHOCYTES NFR BLD AUTO: 28.3 % (ref 19.6–45.3)
MCH RBC QN AUTO: 31.3 PG (ref 26.6–33)
MCHC RBC AUTO-ENTMCNC: 33.5 G/DL (ref 31.5–35.7)
MCV RBC AUTO: 93.6 FL (ref 79–97)
MONOCYTES # BLD AUTO: 0.59 10*3/MM3 (ref 0.1–0.9)
MONOCYTES NFR BLD AUTO: 8.4 % (ref 5–12)
NEUTROPHILS # BLD AUTO: 4.04 10*3/MM3 (ref 1.7–7)
NEUTROPHILS NFR BLD AUTO: 57.7 % (ref 42.7–76)
NRBC BLD AUTO-RTO: 0 /100 WBC (ref 0–0.2)
PLATELET # BLD AUTO: 260 10*3/MM3 (ref 140–450)
POTASSIUM SERPL-SCNC: 4.5 MMOL/L (ref 3.5–5.2)
PROT SERPL-MCNC: 6.9 G/DL (ref 6–8.5)
PSA SERPL-MCNC: 1.77 NG/ML (ref 0–4)
RBC # BLD AUTO: 4.66 10*6/MM3 (ref 4.14–5.8)
SODIUM SERPL-SCNC: 140 MMOL/L (ref 136–145)
TRIGL SERPL-MCNC: 93 MG/DL (ref 0–150)
TSH SERPL DL<=0.005 MIU/L-ACNC: 1.19 UIU/ML (ref 0.27–4.2)
VLDLC SERPL CALC-MCNC: 17 MG/DL (ref 5–40)
WBC # BLD AUTO: 7 10*3/MM3 (ref 3.4–10.8)

## 2021-11-02 NOTE — PROGRESS NOTES
QUICK REFERENCE INFORMATION:  The ABCs of the Annual Wellness Visit    Subsequent Medicare Wellness Visit     HEALTH RISK ASSESSMENT    : 1950    Recent Hospitalizations:  No hospitalization(s) within the last year..  ccc      Current Medical Providers:  Patient Care Team:  Isidro Carson MD as PCP - General (Family Medicine)        Smoking Status:  Social History     Tobacco Use   Smoking Status Never Smoker   Smokeless Tobacco Never Used       Alcohol Consumption:  Social History     Substance and Sexual Activity   Alcohol Use Yes    Comment: occasional       Depression Screen:   PHQ-2/PHQ-9 Depression Screening 2021   Little interest or pleasure in doing things 0   Feeling down, depressed, or hopeless 0   Trouble falling or staying asleep, or sleeping too much 0   Feeling tired or having little energy 0   Poor appetite or overeating 0   Feeling bad about yourself - or that you are a failure or have let yourself or your family down 0   Trouble concentrating on things, such as reading the newspaper or watching television 0   Moving or speaking so slowly that other people could have noticed. Or the opposite - being so fidgety or restless that you have been moving around a lot more than usual 0   Thoughts that you would be better off dead, or of hurting yourself in some way 0   Total Score 0   If you checked off any problems, how difficult have these problems made it for you to do your work, take care of things at home, or get along with other people? Not difficult at all       Health Habits and Functional and Cognitive Screening:  Functional & Cognitive Status 2021   Do you have difficulty preparing food and eating? No   Do you have difficulty bathing yourself, getting dressed or grooming yourself? No   Do you have difficulty using the toilet? No   Do you have difficulty moving around from place to place? No   Do you have trouble with steps or getting out of a bed or a chair? No   Current  Diet Well Balanced Diet   Dental Exam Up to date   Eye Exam Up to date   Exercise (times per week) 3 times per week   Current Exercises Include Walking   Do you need help using the phone?  No   Are you deaf or do you have serious difficulty hearing?  No   Do you need help with transportation? No   Do you need help shopping? No   Do you need help preparing meals?  No   Do you need help with housework?  No   Do you need help with laundry? No   Do you need help taking your medications? No   Do you need help managing money? No   Do you ever drive or ride in a car without wearing a seat belt? No   Have you felt unusual stress, anger or loneliness in the last month? No   Who do you live with? Other   If you need help, do you have trouble finding someone available to you? No   Have you been bothered in the last four weeks by sexual problems? No   Do you have difficulty concentrating, remembering or making decisions? No           Does the patient have evidence of cognitive impairment? No    Asiprin use counseling: Taking ASA appropriately as indicated      Recent Lab Results:          Lab Results   Component Value Date    TRIG 72 09/28/2020    HDL 54 09/28/2020    VLDL 14.4 09/28/2020           Age-appropriate Screening Schedule:  Refer to the list below for future screening recommendations based on patient's age, sex and/or medical conditions. Orders for these recommended tests are listed in the plan section. The patient has been provided with a written plan.    Health Maintenance   Topic Date Due   • ZOSTER VACCINE (1 of 2) Never done   • LIPID PANEL  09/28/2021   • TDAP/TD VACCINES (3 - Td or Tdap) 09/30/2030   • INFLUENZA VACCINE  Completed        Subjective   History of Present Illness    Erwin Shah Jr. is a 70 y.o. male who presents for an Annual Wellness Visit.    The following portions of the patient's history were reviewed and updated as appropriate: allergies, current medications, past family history, past  "medical history, past social history, past surgical history and problem list.    Outpatient Medications Prior to Visit   Medication Sig Dispense Refill   • aspirin  MG tablet Take 325 mg by mouth Daily.     • enalapril (VASOTEC) 20 MG tablet TAKE 1 TABLET BY MOUTH DAILY. MUST KEEP APPT 90 tablet 2   • Loratadine (CLARITIN) 10 MG capsule Take 1 capsule by mouth Daily.     • meclizine (ANTIVERT) 25 MG tablet TAKE 1 TABLET BY MOUTH 3 (THREE) TIMES A DAY AS NEEDED FOR DIZZINESS. 90 tablet 1   • fluorouracil (Efudex) 5 % cream Apply  topically to the appropriate area as directed 2 (Two) Times a Day. 40 g 0     No facility-administered medications prior to visit.       Patient Active Problem List   Diagnosis   • Wellness examination-done   • Gastroesophageal reflux disease   • Hyperlipidemia-offered tx   • Hypertension   • Primary generalized (osteo)arthritis   • Laboratory test   • Anticoagulated prevention+DJD/(), ASA 81x2   • Allergic rhinitis   • Vertigo-? allergy related   • Prostatism   • Skin lesion   • Actinic keratosis   • Cardiac arrhythmia: bPVC   • Diastolic dysfunction       Advance Care Planning:  ACP discussion was held with the patient during this visit. Patient has an advance directive (not in EMR), copy requested.    Identification of Risk Factors:  Risk factors include: Advance Directive Discussion  Colon Cancer Screening  Hearing Problem  Immunizations Discussed/Encouraged (specific immunizations; Tdap, Influenza, Pneumococcal 23, Shingrix and COVID19 )  Prostate Cancer Screening .    Review of Systems    Compared to one year ago, the patient feels his physical health is the same.  Compared to one year ago, the patient feels his mental health is the same.    Objective     Physical Exam    Vitals:    11/01/21 0955   BP: 126/72   Pulse: 78   Resp: 18   Temp: 98 °F (36.7 °C)   TempSrc: Infrared   SpO2: 95%   Weight: 89.4 kg (197 lb)   Height: 175.3 cm (69\")   PainSc: 0-No pain       Patient's " Body mass index is 29.09 kg/m². indicating that he is overweight (BMI 25-29.9). Obesity-related health conditions include the following: hypertension. Obesity is unchanged. BMI is is above average; BMI management plan is completed. We discussed portion control and increasing exercise..      Assessment/Plan   Patient Self-Management and Personalized Health Advice  The patient has been provided with information about: diet, exercise and weight management and preventive services including:   · Annual Wellness Visit (AWV).    Visit Diagnoses:    ICD-10-CM ICD-9-CM   1. Allergic rhinitis, unspecified seasonality, unspecified trigger  J30.9 477.9   2. Primary hypertension  I10 401.9   3. Mixed hyperlipidemia  E78.2 272.2   4. Cardiac arrhythmia, unspecified cardiac arrhythmia type  I49.9 427.9   5. Anticoagulated prevention+DJD/(), ASA 81x2  Z79.01 V58.61   6. Gastroesophageal reflux disease, unspecified whether esophagitis present  K21.9 530.81   7. Wellness examination-done  Z00.00 V70.0   8. Primary generalized (osteo)arthritis  M15.0 715.09   9. Encounter for prostate cancer screening  Z12.5 V76.44       Orders Placed This Encounter   Procedures   • Comprehensive metabolic panel     Order Specific Question:   Release to patient     Answer:   Immediate   • Lipid Panel With LDL/HDL Ratio     Order Specific Question:   Release to patient     Answer:   Immediate   • TSH     Order Specific Question:   Release to patient     Answer:   Immediate   • PSA Screen     Order Specific Question:   Release to patient     Answer:   Immediate   • CBC and Differential     Order Specific Question:   Manual Differential     Answer:   No       Outpatient Encounter Medications as of 11/1/2021   Medication Sig Dispense Refill   • aspirin  MG tablet Take 325 mg by mouth Daily.     • enalapril (VASOTEC) 20 MG tablet TAKE 1 TABLET BY MOUTH DAILY. MUST KEEP APPT 90 tablet 2   • Loratadine (CLARITIN) 10 MG capsule Take 1 capsule by  mouth Daily.     • meclizine (ANTIVERT) 25 MG tablet TAKE 1 TABLET BY MOUTH 3 (THREE) TIMES A DAY AS NEEDED FOR DIZZINESS. 90 tablet 1   • [DISCONTINUED] fluorouracil (Efudex) 5 % cream Apply  topically to the appropriate area as directed 2 (Two) Times a Day. 40 g 0     No facility-administered encounter medications on file as of 11/1/2021.       Reviewed use of high risk medication in the elderly: yes  Reviewed for potential of harmful drug interactions in the elderly: yes    Follow Up:  Return for lab today/, THEN, lab;, Dr Carson-12+ months.     An After Visit Summary and PPPS with all of these plans were given to the patient.

## 2022-02-09 ENCOUNTER — TELEPHONE (OUTPATIENT)
Dept: FAMILY MEDICINE CLINIC | Facility: CLINIC | Age: 72
End: 2022-02-09

## 2022-02-09 RX ORDER — AZITHROMYCIN 250 MG/1
TABLET, FILM COATED ORAL
Qty: 6 TABLET | Refills: 0 | Status: ON HOLD | OUTPATIENT
Start: 2022-02-09 | End: 2022-08-26

## 2022-02-09 NOTE — TELEPHONE ENCOUNTER
"z pack  Advise he find a covid test down there  Update his covid vaccine if he had booster/vs not    Vm from patient \"I want to get a prescription sent in for a sinus infection, im having pain and discomfort\"    In [place] and gave [place] phone number [phone] (cough not find pharmacy)  "

## 2022-03-11 DIAGNOSIS — I10 ESSENTIAL HYPERTENSION: Chronic | ICD-10-CM

## 2022-03-11 RX ORDER — ENALAPRIL MALEATE 20 MG/1
20 TABLET ORAL DAILY
Qty: 90 TABLET | Refills: 2 | Status: SHIPPED | OUTPATIENT
Start: 2022-03-11 | End: 2022-11-18

## 2022-03-11 NOTE — TELEPHONE ENCOUNTER
Rx Refill Note  Requested Prescriptions     Pending Prescriptions Disp Refills   • enalapril (VASOTEC) 20 MG tablet [Pharmacy Med Name: ENALAPRIL MALEATE 20 MG TAB] 90 tablet 2     Sig: TAKE 1 TABLET BY MOUTH DAILY. MUST KEEP APPT      Last office visit with prescribing clinician: 11/1/2021      Next office visit with prescribing clinician: 11/7/2022            Misti Zhang MA  03/11/22, 11:24 CST

## 2022-06-04 DIAGNOSIS — R42 VERTIGO: ICD-10-CM

## 2022-06-06 RX ORDER — MECLIZINE HYDROCHLORIDE 25 MG/1
25 TABLET ORAL 3 TIMES DAILY PRN
Qty: 90 TABLET | Refills: 1 | Status: SHIPPED | OUTPATIENT
Start: 2022-06-06

## 2022-06-06 NOTE — TELEPHONE ENCOUNTER
Rx Refill Note  Requested Prescriptions     Pending Prescriptions Disp Refills   • meclizine (ANTIVERT) 25 MG tablet [Pharmacy Med Name: MECLIZINE 25 MG TABLET] 90 tablet 1     Sig: TAKE 1 TABLET BY MOUTH 3 (THREE) TIMES A DAY AS NEEDED FOR DIZZINESS.      Last office visit with prescribing clinician: Visit date not found      Next office visit with prescribing clinician: Visit date not found            Vicki Mccloud, PCT  06/06/22, 09:29 CDT

## 2022-08-15 ENCOUNTER — OFFICE VISIT (OUTPATIENT)
Dept: GASTROENTEROLOGY | Facility: CLINIC | Age: 72
End: 2022-08-15

## 2022-08-15 VITALS
SYSTOLIC BLOOD PRESSURE: 146 MMHG | WEIGHT: 198 LBS | TEMPERATURE: 96.6 F | HEIGHT: 69 IN | BODY MASS INDEX: 29.33 KG/M2 | OXYGEN SATURATION: 98 % | DIASTOLIC BLOOD PRESSURE: 74 MMHG | HEART RATE: 76 BPM

## 2022-08-15 DIAGNOSIS — Z86.010 HISTORY OF ADENOMATOUS POLYP OF COLON: Primary | ICD-10-CM

## 2022-08-15 PROBLEM — Z86.0101 HISTORY OF ADENOMATOUS POLYP OF COLON: Status: ACTIVE | Noted: 2022-08-15

## 2022-08-15 PROCEDURE — S0260 H&P FOR SURGERY: HCPCS | Performed by: NURSE PRACTITIONER

## 2022-08-15 RX ORDER — SODIUM PICOSULFATE, MAGNESIUM OXIDE, AND ANHYDROUS CITRIC ACID 10; 3.5; 12 MG/160ML; G/160ML; G/160ML
160 LIQUID ORAL ONCE
Qty: 320 ML | Refills: 0 | Status: SHIPPED | OUTPATIENT
Start: 2022-08-15 | End: 2022-08-15

## 2022-08-15 NOTE — PROGRESS NOTES
[facility] Gastroenterology    Primary Physician Isidro Carson MD    8/15/2022    Erwin Shah Jr.   1950      Chief Complaint   Patient presents with   • Colonoscopy       Subjective     HPI    Erwin Shah Jr. is a 71 y.o. male who presents as a referral for preventative maintenance. He has no complaints of nausea or vomiting. No change in bowels. No wt loss. No BRBPR. No melena. No abdominal pain.       COLONOSCOPY (07/31/2017 09:43)  Tissue Pathology Exam (07/31/2017 10:04) tubular adenomatous.      There is not a family history of colon polyps/colon cancer.     Past Medical History:   Diagnosis Date   • Allergic    • Arthritis    • Asthma    • GERD (gastroesophageal reflux disease)    • Hyperlipidemia    • Hypertension        Past Surgical History:   Procedure Laterality Date   • CHOLECYSTECTOMY     • COLONOSCOPY N/A 7/31/2017    Procedure: COLONOSCOPY;  Surgeon: Ajith Perez MD;  Location: [facility] ENDOSCOPY;  Service:    • ENDOSCOPY  05/07/2010       Outpatient Medications Marked as Taking for the 8/15/22 encounter (Office Visit) with Amrita Molina APRN   Medication Sig Dispense Refill   • aspirin  MG tablet Take 325 mg by mouth Daily.     • enalapril (VASOTEC) 20 MG tablet TAKE 1 TABLET BY MOUTH DAILY. MUST KEEP APPT 90 tablet 2   • Loratadine 10 MG capsule Take 1 capsule by mouth Daily.     • meclizine (ANTIVERT) 25 MG tablet TAKE 1 TABLET BY MOUTH 3 (THREE) TIMES A DAY AS NEEDED FOR DIZZINESS. (Patient taking differently: Take 25 mg by mouth As Needed for Dizziness (rare).) 90 tablet 1       No Known Allergies    Social History     Socioeconomic History   • Marital status:    Tobacco Use   • Smoking status: Never Smoker   • Smokeless tobacco: Never Used   Substance and Sexual Activity   • Alcohol use: Yes     Comment: occasional   • Drug use: No   • Sexual activity: Defer       Family History   Problem Relation Age of Onset   • No Known Problems Mother    • Liver  disease Father    • Colon cancer Neg Hx        Review of Systems   Constitutional: Negative for chills, fever and unexpected weight change.   Respiratory: Negative for shortness of breath and wheezing.    Cardiovascular: Negative for chest pain and palpitations.   Gastrointestinal: Negative for abdominal distention, abdominal pain, anal bleeding, blood in stool, constipation, diarrhea, nausea and vomiting.       Objective     Vitals:    08/15/22 0832   BP: 146/74   Pulse: 76   Temp: 96.6 °F (35.9 °C)   SpO2: 98%         08/15/22  0832   Weight: 89.8 kg (198 lb)     Body mass index is 29.24 kg/m².    Physical Exam  Vitals reviewed.   Constitutional:       General: He is not in acute distress.  Cardiovascular:      Rate and Rhythm: Normal rate and regular rhythm.      Heart sounds: Normal heart sounds.   Pulmonary:      Effort: Pulmonary effort is normal.      Breath sounds: Normal breath sounds.   Abdominal:      General: Bowel sounds are normal. There is no distension.      Palpations: Abdomen is soft.      Tenderness: There is no abdominal tenderness.   Skin:     General: Skin is warm and dry.   Neurological:      Mental Status: He is alert.         Imaging Results (Most Recent)     None          Assessment & Plan     Diagnoses and all orders for this visit:    1. History of adenomatous polyp of colon (Primary)  -     Case Request; Standing  -     Case Request    Other orders  -     Follow Anesthesia Guidelines / Protocol; Future  -     Obtain Informed Consent; Future  -     Sod Picosulfate-Mag Ox-Cit Acd (Clenpiq) 10-3.5-12 MG-GM -GM/160ML solution; Take 160 mL by mouth 1 (One) Time for 1 dose. Take as directed  Dispense: 320 mL; Refill: 0      Plan for colonoscopy.                  COLONOSCOPY WITH ANESTHESIA (N/A)  All risks, benefits, alternatives, and indications of colonoscopy procedure have been discussed with the patient. Risks to include perforation of the colon requiring possible surgery or colostomy,  risk of bleeding from biopsies or removal of colon tissue, possibility of missing a colon polyp or cancer, or adverse drug reaction.  Benefits to include the diagnosis and management of disease of the colon and rectum. Alternatives to include barium enema, radiographic evaluation, lab testing or no intervention. Pt verbalizes understanding and agrees.     Amrita Molina, CHRISSY

## 2022-08-15 NOTE — H&P (VIEW-ONLY)
[facility] Gastroenterology    Primary Physician Isidro Carson MD    8/15/2022    Erwin Shah Jr.   1950      Chief Complaint   Patient presents with   • Colonoscopy       Subjective     HPI    Erwin Shah Jr. is a 71 y.o. male who presents as a referral for preventative maintenance. He has no complaints of nausea or vomiting. No change in bowels. No wt loss. No BRBPR. No melena. No abdominal pain.       COLONOSCOPY (07/31/2017 09:43)  Tissue Pathology Exam (07/31/2017 10:04) tubular adenomatous.      There is not a family history of colon polyps/colon cancer.     Past Medical History:   Diagnosis Date   • Allergic    • Arthritis    • Asthma    • GERD (gastroesophageal reflux disease)    • Hyperlipidemia    • Hypertension        Past Surgical History:   Procedure Laterality Date   • CHOLECYSTECTOMY     • COLONOSCOPY N/A 7/31/2017    Procedure: COLONOSCOPY;  Surgeon: Ajith Perez MD;  Location: [facility] ENDOSCOPY;  Service:    • ENDOSCOPY  05/07/2010       Outpatient Medications Marked as Taking for the 8/15/22 encounter (Office Visit) with Amrita Molina APRN   Medication Sig Dispense Refill   • aspirin  MG tablet Take 325 mg by mouth Daily.     • enalapril (VASOTEC) 20 MG tablet TAKE 1 TABLET BY MOUTH DAILY. MUST KEEP APPT 90 tablet 2   • Loratadine 10 MG capsule Take 1 capsule by mouth Daily.     • meclizine (ANTIVERT) 25 MG tablet TAKE 1 TABLET BY MOUTH 3 (THREE) TIMES A DAY AS NEEDED FOR DIZZINESS. (Patient taking differently: Take 25 mg by mouth As Needed for Dizziness (rare).) 90 tablet 1       No Known Allergies    Social History     Socioeconomic History   • Marital status:    Tobacco Use   • Smoking status: Never Smoker   • Smokeless tobacco: Never Used   Substance and Sexual Activity   • Alcohol use: Yes     Comment: occasional   • Drug use: No   • Sexual activity: Defer       Family History   Problem Relation Age of Onset   • No Known Problems Mother    • Liver  disease Father    • Colon cancer Neg Hx        Review of Systems   Constitutional: Negative for chills, fever and unexpected weight change.   Respiratory: Negative for shortness of breath and wheezing.    Cardiovascular: Negative for chest pain and palpitations.   Gastrointestinal: Negative for abdominal distention, abdominal pain, anal bleeding, blood in stool, constipation, diarrhea, nausea and vomiting.       Objective     Vitals:    08/15/22 0832   BP: 146/74   Pulse: 76   Temp: 96.6 °F (35.9 °C)   SpO2: 98%         08/15/22  0832   Weight: 89.8 kg (198 lb)     Body mass index is 29.24 kg/m².    Physical Exam  Vitals reviewed.   Constitutional:       General: He is not in acute distress.  Cardiovascular:      Rate and Rhythm: Normal rate and regular rhythm.      Heart sounds: Normal heart sounds.   Pulmonary:      Effort: Pulmonary effort is normal.      Breath sounds: Normal breath sounds.   Abdominal:      General: Bowel sounds are normal. There is no distension.      Palpations: Abdomen is soft.      Tenderness: There is no abdominal tenderness.   Skin:     General: Skin is warm and dry.   Neurological:      Mental Status: He is alert.         Imaging Results (Most Recent)     None          Assessment & Plan     Diagnoses and all orders for this visit:    1. History of adenomatous polyp of colon (Primary)  -     Case Request; Standing  -     Case Request    Other orders  -     Follow Anesthesia Guidelines / Protocol; Future  -     Obtain Informed Consent; Future  -     Sod Picosulfate-Mag Ox-Cit Acd (Clenpiq) 10-3.5-12 MG-GM -GM/160ML solution; Take 160 mL by mouth 1 (One) Time for 1 dose. Take as directed  Dispense: 320 mL; Refill: 0      Plan for colonoscopy.                  COLONOSCOPY WITH ANESTHESIA (N/A)  All risks, benefits, alternatives, and indications of colonoscopy procedure have been discussed with the patient. Risks to include perforation of the colon requiring possible surgery or colostomy,  risk of bleeding from biopsies or removal of colon tissue, possibility of missing a colon polyp or cancer, or adverse drug reaction.  Benefits to include the diagnosis and management of disease of the colon and rectum. Alternatives to include barium enema, radiographic evaluation, lab testing or no intervention. Pt verbalizes understanding and agrees.     Amrita Molina, CHRISSY

## 2022-08-26 ENCOUNTER — ANESTHESIA (OUTPATIENT)
Dept: GASTROENTEROLOGY | Facility: HOSPITAL | Age: 72
End: 2022-08-26

## 2022-08-26 ENCOUNTER — ANESTHESIA EVENT (OUTPATIENT)
Dept: GASTROENTEROLOGY | Facility: HOSPITAL | Age: 72
End: 2022-08-26

## 2022-08-26 ENCOUNTER — TELEPHONE (OUTPATIENT)
Dept: GASTROENTEROLOGY | Facility: CLINIC | Age: 72
End: 2022-08-26

## 2022-08-26 ENCOUNTER — HOSPITAL ENCOUNTER (OUTPATIENT)
Facility: HOSPITAL | Age: 72
Setting detail: HOSPITAL OUTPATIENT SURGERY
Discharge: HOME | End: 2022-08-26
Attending: INTERNAL MEDICINE | Admitting: INTERNAL MEDICINE

## 2022-08-26 VITALS
DIASTOLIC BLOOD PRESSURE: 66 MMHG | RESPIRATION RATE: 19 BRPM | HEART RATE: 66 BPM | OXYGEN SATURATION: 96 % | SYSTOLIC BLOOD PRESSURE: 130 MMHG

## 2022-08-26 PROCEDURE — G0105 COLORECTAL SCRN; HI RISK IND: HCPCS | Performed by: INTERNAL MEDICINE

## 2022-08-26 PROCEDURE — 25010000002 PROPOFOL 10 MG/ML EMULSION: Performed by: NURSE ANESTHETIST, CERTIFIED REGISTERED

## 2022-08-26 RX ORDER — SODIUM CHLORIDE 0.9 % (FLUSH) 0.9 %
10 SYRINGE (ML) INJECTION AS NEEDED
Status: CANCELLED | OUTPATIENT
Start: 2022-08-26

## 2022-08-26 RX ORDER — PROPOFOL 10 MG/ML
VIAL (ML) INTRAVENOUS AS NEEDED
Status: DISCONTINUED | OUTPATIENT
Start: 2022-08-26 | End: 2022-08-26 | Stop reason: SURG

## 2022-08-26 RX ORDER — SODIUM CHLORIDE 0.9 % (FLUSH) 0.9 %
10 SYRINGE (ML) INJECTION EVERY 12 HOURS SCHEDULED
Status: CANCELLED | OUTPATIENT
Start: 2022-08-26

## 2022-08-26 RX ORDER — SODIUM CHLORIDE 9 MG/ML
500 INJECTION, SOLUTION INTRAVENOUS CONTINUOUS PRN
Status: DISCONTINUED | OUTPATIENT
Start: 2022-08-26 | End: 2022-08-26 | Stop reason: HOSPADM

## 2022-08-26 RX ORDER — LIDOCAINE HYDROCHLORIDE 10 MG/ML
0.5 INJECTION, SOLUTION EPIDURAL; INFILTRATION; INTRACAUDAL; PERINEURAL ONCE AS NEEDED
Status: DISCONTINUED | OUTPATIENT
Start: 2022-08-26 | End: 2022-08-26 | Stop reason: HOSPADM

## 2022-08-26 RX ORDER — SODIUM CHLORIDE 0.9 % (FLUSH) 0.9 %
10 SYRINGE (ML) INJECTION AS NEEDED
Status: DISCONTINUED | OUTPATIENT
Start: 2022-08-26 | End: 2022-08-26 | Stop reason: HOSPADM

## 2022-08-26 RX ORDER — SODIUM CHLORIDE 9 MG/ML
1000 INJECTION, SOLUTION INTRAVENOUS CONTINUOUS
Status: DISCONTINUED | OUTPATIENT
Start: 2022-08-26 | End: 2022-08-26 | Stop reason: HOSPADM

## 2022-08-26 RX ORDER — SODIUM CHLORIDE, SODIUM LACTATE, POTASSIUM CHLORIDE, CALCIUM CHLORIDE 600; 310; 30; 20 MG/100ML; MG/100ML; MG/100ML; MG/100ML
1000 INJECTION, SOLUTION INTRAVENOUS CONTINUOUS PRN
Status: DISCONTINUED | OUTPATIENT
Start: 2022-08-26 | End: 2022-08-26 | Stop reason: HOSPADM

## 2022-08-26 RX ORDER — ONDANSETRON 2 MG/ML
4 INJECTION INTRAMUSCULAR; INTRAVENOUS ONCE AS NEEDED
Status: DISCONTINUED | OUTPATIENT
Start: 2022-08-26 | End: 2022-08-26 | Stop reason: HOSPADM

## 2022-08-26 RX ORDER — SODIUM CHLORIDE 9 MG/ML
100 INJECTION, SOLUTION INTRAVENOUS CONTINUOUS
Status: CANCELLED | OUTPATIENT
Start: 2022-08-26

## 2022-08-26 RX ADMIN — PROPOFOL 100 MG: 10 INJECTION, EMULSION INTRAVENOUS at 11:45

## 2022-08-26 RX ADMIN — PROPOFOL 100 MG: 10 INJECTION, EMULSION INTRAVENOUS at 12:02

## 2022-08-26 RX ADMIN — PROPOFOL 100 MG: 10 INJECTION, EMULSION INTRAVENOUS at 11:55

## 2022-08-26 RX ADMIN — SODIUM CHLORIDE 500 ML: 9 INJECTION, SOLUTION INTRAVENOUS at 10:25

## 2022-08-26 NOTE — ANESTHESIA PREPROCEDURE EVALUATION
Anesthesia Evaluation     Patient summary reviewed and Nursing notes reviewed   NPO Solid Status: > 8 hours  NPO Liquid Status: > 8 hours           Airway   Mallampati: II  TM distance: >3 FB  Neck ROM: full  no difficulty expected  Dental      Pulmonary    (+) asthma,  Cardiovascular   Exercise tolerance: good (4-7 METS)    Rhythm: regular    (+) hypertension, dysrhythmias PVC, hyperlipidemia,   (-) pacemaker, valvular problems/murmurs, past MI, cardiac stents, CABG    ROS comment: Palpitations.. Negative stress test/echo    Neuro/Psych- negative ROS  (-) seizures, CVA  GI/Hepatic/Renal/Endo    (+)  GERD,    (-) no renal disease, diabetes    Musculoskeletal     Abdominal    Substance History - negative use     OB/GYN negative ob/gyn ROS         Other   arthritis,                        Anesthesia Plan    ASA 2     MAC     intravenous induction     Anesthetic plan, risks, benefits, and alternatives have been provided, discussed and informed consent has been obtained with: patient and spouse/significant other.

## 2022-08-26 NOTE — ANESTHESIA POSTPROCEDURE EVALUATION
Patient: Erwin Shah Jr.    Procedure Summary     Date: 08/26/22 Room / Location: [facility] ENDOSCOPY 6 / [facility] ENDOSCOPY    Anesthesia Start: 1143 Anesthesia Stop: 1207    Procedure: COLONOSCOPY WITH ANESTHESIA (N/A ) Diagnosis:       History of adenomatous polyp of colon      (History of adenomatous polyp of colon [Z86.010])    Surgeons: Ajith Perez MD Provider: Ronnie Barry CRNA    Anesthesia Type: MAC ASA Status: 2          Anesthesia Type: MAC    Vitals  Vitals Value Taken Time   /67 08/26/22 1209   Temp     Pulse     Resp     SpO2     Vitals shown include unvalidated device data.        Post Anesthesia Care and Evaluation    Patient location during evaluation: PHASE II  Patient participation: complete - patient participated  Level of consciousness: awake and alert  Pain score: 0  Pain management: adequate    Airway patency: patent  Anesthetic complications: No anesthetic complications  PONV Status: none  Cardiovascular status: acceptable and stable  Respiratory status: acceptable  Hydration status: acceptable

## 2022-11-07 ENCOUNTER — OFFICE VISIT (OUTPATIENT)
Dept: FAMILY MEDICINE CLINIC | Facility: CLINIC | Age: 72
End: 2022-11-07

## 2022-11-07 VITALS
OXYGEN SATURATION: 96 % | BODY MASS INDEX: 29.38 KG/M2 | DIASTOLIC BLOOD PRESSURE: 84 MMHG | HEIGHT: 69 IN | WEIGHT: 198.4 LBS | HEART RATE: 68 BPM | SYSTOLIC BLOOD PRESSURE: 138 MMHG | TEMPERATURE: 97.3 F | RESPIRATION RATE: 18 BRPM

## 2022-11-07 DIAGNOSIS — K21.9 GASTROESOPHAGEAL REFLUX DISEASE, UNSPECIFIED WHETHER ESOPHAGITIS PRESENT: Chronic | ICD-10-CM

## 2022-11-07 DIAGNOSIS — R03.0 ELEVATED BLOOD PRESSURE READING: ICD-10-CM

## 2022-11-07 DIAGNOSIS — L98.9 SKIN LESION: ICD-10-CM

## 2022-11-07 DIAGNOSIS — J30.9 ALLERGIC RHINITIS, UNSPECIFIED SEASONALITY, UNSPECIFIED TRIGGER: ICD-10-CM

## 2022-11-07 DIAGNOSIS — Z79.01 ANTICOAGULATED: ICD-10-CM

## 2022-11-07 DIAGNOSIS — E78.2 MIXED HYPERLIPIDEMIA: Chronic | ICD-10-CM

## 2022-11-07 DIAGNOSIS — Z00.00 WELLNESS EXAMINATION: ICD-10-CM

## 2022-11-07 DIAGNOSIS — R10.31 RIGHT LOWER QUADRANT ABDOMINAL PAIN: ICD-10-CM

## 2022-11-07 DIAGNOSIS — I10 PRIMARY HYPERTENSION: Chronic | ICD-10-CM

## 2022-11-07 DIAGNOSIS — L57.0 ACTINIC KERATOSIS: ICD-10-CM

## 2022-11-07 DIAGNOSIS — N40.0 PROSTATISM: ICD-10-CM

## 2022-11-07 DIAGNOSIS — M15.0 PRIMARY GENERALIZED (OSTEO)ARTHRITIS: ICD-10-CM

## 2022-11-07 DIAGNOSIS — I49.9 CARDIAC ARRHYTHMIA, UNSPECIFIED CARDIAC ARRHYTHMIA TYPE: ICD-10-CM

## 2022-11-07 PROCEDURE — G0439 PPPS, SUBSEQ VISIT: HCPCS | Performed by: FAMILY MEDICINE

## 2022-11-07 PROCEDURE — 99214 OFFICE O/P EST MOD 30 MIN: CPT | Performed by: FAMILY MEDICINE

## 2022-11-07 PROCEDURE — 1159F MED LIST DOCD IN RCRD: CPT | Performed by: FAMILY MEDICINE

## 2022-11-07 PROCEDURE — 96160 PT-FOCUSED HLTH RISK ASSMT: CPT | Performed by: FAMILY MEDICINE

## 2022-11-07 PROCEDURE — 1126F AMNT PAIN NOTED NONE PRSNT: CPT | Performed by: FAMILY MEDICINE

## 2022-11-07 PROCEDURE — 1170F FXNL STATUS ASSESSED: CPT | Performed by: FAMILY MEDICINE

## 2022-11-07 NOTE — PATIENT INSTRUCTIONS
"Your blood pressure was higher than what we really wanted for you today at 150 range.  It is often a problem for blood pressures in the doctor's office to be higher than home (called white coat syndrome).   Goals for your blood pressure are 130-139 range top and 80-89 range bottom and you feeling ok.     We really advise rechecking your blood pressure at home (or with a friend) daily to every other day for the next several days and let us know if your pattern is not the goals above.  If you will do this make sure you control variables that can make your blood pressure show higher than it really is:   A. Use a machine that measures your blood pressure at the upper arm level; not wrist or lower  B. Take off any shirts/garmets and apply the cuff to your bare arm  C. Be sure and rest your arm being used on a table/like so it is totally supported  D. Do not cross your legs while taking your blood pressure  E. Be calm, rested and not upset/anxious in any way while taking your blood pressure  F. Avoid talking while taking the blood pressure  G. Be sure your back is supported and not in a strain while taking your blood pressure.     If you cannot do this at home/with a friend OR want it done here we are happy to make appointments here for that (we only charge/bill for a nurse visit for doing this).      Please CALL US if your blood pressure stays up as that probably means you have a problem; we likely will adjust things even over the phone.  We want your blood pressure to be normal.     OMRON is a reliable/common home blood automatic blood pressure device that can range around 50-75$ and most of this brand is certified by the government.  A good model would be Omron model 7.  Many chain brands ([facility], etc) if you look will say they are made by [employer].     ########################     Medicare/insurances offer certain visits called \"wellness/annual\" that allows for time to deal with and  review the many aspects of " "\"being well\" that just might not get mentioned during other visits with your doctor through the year.  This includes things like reviews of health screenings (mammograms, various labs),  weight, exercise, vaccines for just a few examples.      In order to help you with this we wish to make you aware of a few things for you to consider:    1. Advanced directives.  These are documents used to help direct your care if your health/situation should reach a point that you cannot make your own decisions.  While it is likely you do not currently have a need for these documents now; it is something that we all might face at any time.   The hand outs you are being given today are simply for you to review and use to learn more about these documents and consider them as you wish.      2. Vaccines: Certain vaccines are important after age 50, 60, and 65 and some health situations (for example COPD), require even boosters beyond age 65.  We are happy to review with you your vaccine status and vaccines that might be needed for you at this point:      a. Tetanus.   Like anyone this needs to given every 10 years; sooner for/with lacerations/wounds.   Likely when getting this booster it needs to be a tetanus called Tdap (tetanus mixed with diptheria and pertussis).   Years ago you had this vaccine.  We now know we can lose our immunity to pertussis (a part of this vaccine) and run a risk of catching this.  Now only would this make us ill; but more importantly we can spread this to very young children (and for them it can be a much more dangerous illness).   We call this the grandparent vaccine for this reason.     b. Pneumonia (strept).   This comes now with two brands.   It is recommended to take pneumovax first; and a year later take the cousin prevnar.  Even if you have had these before; we need to review when and your current health situation/s as you may need boosters and even recently the CDC has made recent/new recommendations " for pneumovax.      c. Shingles.  You do not want to catch shingles.  Though you will recover from this; the pain associated with shingles can be severe.  Even if you have had the now older zostavax, or have had shingles; it is recommended you still get the Shingrix (the new vaccine just available early 2018 shingles vaccine).  A new shingles vaccine (a shot to lower your chance of catching shingles) is now available (shingrix).  This vaccine is the second vaccine created for this purpose; (we have had zostavax for years).  Shingrix provides a much better and longer immunity for shingles than zostavax.  For this and other reasons Shingrix can be started at age 50.  If you have had zostavax in the past; you can still take Shingrix.      This vaccine is not paid for in a doctor's office by medicare, medicaid and probably most insurances.  Like zostavax; this is covered in drug stores.  This is a vaccine that if you chose to get you need to get at a drug store that gives vaccines (like [facility], [facility] and [facility].      d. Yearly flu vaccine given from September through April each year (there is a special vaccine for those over 65).     e. Travel vaccines:  If you are one to do international travel; be sure and ask us for any particular unusual vaccines you may need.     f.  Miscellaneous:  If you have certain health situations/disease you may need specific/particular vaccines not give to the general public.     g.  Covid: currently recommended everyone over 5.  The brands Pfizer/Moderna are for 3 total shots as immunity will wane from less than this.  Perzo/Perzo has a version that comes with recommendations for an initial vaccine and booster after.  I no longer recommend J&J as a first choice as Pfizer/Moderna are readily available.  If you have had an initial J&J I recommend you booster with Moderna.   I strongly recommend covid vaccination; being unvaccinated or partially vaccinated  carries real risk for disease and even death.     Because of many restrictions on this office always having all the above vaccines; you may be advised to work with your local health department to keep up with your individual vaccine needs.    The vaccines we have on record for you include:   Immunization History   Administered Date(s) Administered    COVID-19 (MODERNA) 1st, 2nd, 3rd Dose Only 03/18/2021, 04/15/2021    COVID-19 (MODERNA) BOOSTER 12/24/2021    Fluad Quad 65+ 09/30/2020, 10/15/2021    Fluzone High Dose =>65 Years ([facility] ONLY) 10/01/2018    Pneumococcal Conjugate 13-Valent (PCV13) 10/15/2021    Td 04/14/2008    Tdap 09/30/2020       If you have record of other vaccines and want them to show in your chart here; please talk to our nurses about having your vaccine record updated.     3. Exercise: regular cardio exercise something everyone should consider and try to do; even if health limitations (ie find that exercise UE/LE/cardio that they can tolerate).   Normal weight a goal for everyone (as we discussed)    4. Healthy diet helpful for weight management, illness prevention.     5. If over 50-screening exams include men PSA/rectal exam, women mammograms, and everyone colonoscopy screening for colon cancer.    6. If you use tobacco of any kind or e-products you should stop. We are providing you some information to consider that could make this process easier.      ##################################

## 2022-11-07 NOTE — PROGRESS NOTES
Subjective   Erwin Shah Jr. is a 71 y.o. male presenting with chief complaint of:   Chief Complaint   Patient presents with   • Medicare Wellness-subsequent       History of Present Illness :  Alone.   Here for review of chronic problems that includes HTN and others.  Also yearly medicare wellness exam.    Has multiple chronic problems to consider that might have a bearing on today's issues;  an interval appointment.       Chronic/acute problems reviewed today:   1. Allergic rhinitis, unspecified seasonality, unspecified trigger Chronic/variable.   On/off eye, sinus, nasal congestion and drainage.  Rx helps.  Aware of options additional medications, testing and not interested.     2. Mixed hyperlipidemia Chronic/stable: prefers no statn.  Prefers lifestyle over Rx;  with diet-exercise advised and lab moitored.     3. Primary hypertension Chronic/variable; see below.  Stable here past/infrequent home blood pressures.  No significant chest pain, SOB, LE edema, orthopnea, near syncope, dizziness/light headness.   Recent Vitals       8/26/2022 11/7/2022 11/7/2022       BP: 130/66 158/84 138/84     Pulse: 66 68 --     Temp: -- 97.3 °F (36.3 °C) --     Weight: -- 90 kg (198 lb 6.4 oz) --     BMI (Calculated): -- 29.3 --            4. Cardiac arrhythmia, unspecified cardiac arrhythmia type    5. Anticoagulated prevention+DJD/(), ASA 81x2 Chronic/stable reason for stopping or use of.  Denies bleeding issues; especially epistaxis, melena, hematochezia.  Upper arms/others do not significantly bruise easily.  No significant bleeding or falls.      6. Gastroesophageal reflux disease, unspecified whether esophagitis present Chronic/stable.  Controlled heartburn, reflux without dysphagia, melena.  Rx used, periods not used proven currently needed with symptoms -currently doing ok.      7. Prostatism Chronic/stable.  Slower but tolerated stream with complete emptying.  Nocturia on occ; tolerated.  No desire to persure  evaluations/surgery.      8. Wellness examination-done Chronic ongoing over time screening or advice for general health care/wellness.      9. Primary generalized (osteo)arthritis Chronic/stable.  Various on/off joint pains/soreness/stiffness.  Particular joint problems with various.  No joint swelling.  Treats mainly with reduced activity, Rx listed, Tylenol.  No frequent NSAIDs, and no recent  injections.      10. Right lower quadrant abdominal pain occasional second type discomfort in the right upper right lower quadrant and his gallbladder has been removed.  Recent colonoscopy unremarkable.  No change in stools.  No dysuria   11. Elevated blood pressure reading initial blood pressure reading was elevated and occasionally similar at home.  Without chest pain shortness of breath   12. Actinic keratosis previous known; has used 5-FU.  No other concerns   13. Skin lesion occasionally changing areas of skin lesions; previous one in the right cheek is not an issue but there is some dark discoloration under his right eye that he would like opinion.  It does not hurt     Has an/another acute issue today: none    The following portions of the patient's history were reviewed and updated as appropriate: allergies, current medications, past family history, past medical history, past social history, past surgical history and problem list.      Current Outpatient Medications:   •  aspirin  MG tablet, Take 325 mg by mouth Daily., Disp: , Rfl:   •  enalapril (VASOTEC) 20 MG tablet, TAKE 1 TABLET BY MOUTH DAILY. MUST KEEP APPT, Disp: 90 tablet, Rfl: 2  •  Loratadine 10 MG capsule, Take 1 capsule by mouth Daily., Disp: , Rfl:   •  meclizine (ANTIVERT) 25 MG tablet, TAKE 1 TABLET BY MOUTH 3 (THREE) TIMES A DAY AS NEEDED FOR DIZZINESS. (Patient taking differently: Take 1 tablet by mouth As Needed for Dizziness (rare).), Disp: 90 tablet, Rfl: 1    No problems with medications.    No Known Allergies    Review of  Systems  GENERAL:  Active/slower with limits, speed, stamina for age; minimal influence. Sleep is ok; apnea denied. No fever now.  SKIN: No rash/skin lesion of concern unless mentioned above/below.   ENDO:  No syncope, near or diaphoretic sweaty spells.  HEENT: No recent head injury; or headache.  No vision change.   No significant hearing loss.  Ears without pain/drainage.  No sore throat.  Occ on/off nasal/sinus congestion/drainage. No epistaxis.  CHEST: No chest wall tenderness or mass. No cough, without wheeze.  No SOB; no hemoptysis.  CV: No chest pain, palpitations, ankle edema.  GI: No heartburn, dysphagia.  No usual abdominal pain, diarrhea, constipation.  No rectal bleeding, or melena.    :  Voids without dysuria, or incontinence to completion.    ORTHO: No painful/swollen joints but various on /off sore.  No change occ sore neck or back.  No acute neck or back pain without recent injury.  NEURO: No dizziness, weakness of extremities.  No numbness/paresthesias.   PSYCH: No memory loss.  Mood good; not that anxious, depressed but/and not suicidal.  Tries to tolerate stress .   Screening:  Mammogram: NA  Bone density: NA  Low dose CT chest: non-smoker/NA  GI:   EGD+biop/LH/Premaben/5.7.10/3y  Colon-polyp/BH/Shieben/7.31.17/5y  Colon-nl/Shieben/BH/8.26.22/5y  Prostate: 11.7.22 -voiding ok  11.1.21 AUA same  9.30.20 AUA 4  3.27.19 AUA 2/1  Usual lab order  12m CBC, CMP, LIPID, TSH, PSAs    Copy/paste function used for ROS/exam AND each area of these were reviewed, updated, confirmed and supplemented as needed.  Data reviewed:   Recent admit/ER/MD visits: 11.1.22  1. Allergic rhinitis, unspecified seasonality, unspecified trigger    2. Primary hypertension    3. Mixed hyperlipidemia    4. Cardiac arrhythmia, unspecified cardiac arrhythmia type    5. Anticoagulated prevention+DJD/(), ASA 81x2    6. Gastroesophageal reflux disease, unspecified whether esophagitis present    7. Wellness examination-done     8. Primary generalized (osteo)arthritis    9. Encounter for prostate cancer screening       Discussion:  Goals for blood pressure reviewed\  Report any syncope near syncope with his history of PVCs and any chest pain  Avoid over-the-counter decongestants  Infrequent use of nonsteroidals is safer than more use    Last cardiac testing:   Echo: Results for orders placed during the hospital encounter of 06/22/21    Adult Stress Echo W/ Cont or Stress Agent if Necessary Per Protocol    Interpretation Summary  · Agosto treadmill score of 9  · No ischemic changes during stress  · Frequent PVCs noted throughout the exam  · No clinical symptoms of ischemia during stress  · Excellent functional capacity at 10 metabolic equivalents  · Appropriate blood pressure and heart rate response to stress  · Segment augmentation had a normal response to stress. Cavity size behaved normally in response to stress.  · Normal stress echo consistent with a low risk study for myocardial ischemia.  · Overall this is a low risk test for ischemia. Frequent PVCs noted throughout the entire exam.      Radiology considered:   No radiology results for the last 90 days.      Lab Results:  Results for orders placed or performed in visit on 11/01/21   Comprehensive metabolic panel    Specimen: Blood   Result Value Ref Range    Glucose 91 65 - 99 mg/dL    BUN 12 8 - 23 mg/dL    Creatinine 0.92 0.76 - 1.27 mg/dL    eGFR Non African Am 82 >60 mL/min/1.73    eGFR African Am 99 >60 mL/min/1.73    BUN/Creatinine Ratio 13.0 7.0 - 25.0    Sodium 140 136 - 145 mmol/L    Potassium 4.5 3.5 - 5.2 mmol/L    Chloride 105 98 - 107 mmol/L    Total CO2 28.3 22.0 - 29.0 mmol/L    Calcium 9.2 8.6 - 10.5 mg/dL    Total Protein 6.9 6.0 - 8.5 g/dL    Albumin 4.20 3.50 - 5.20 g/dL    Globulin 2.7 gm/dL    A/G Ratio 1.6 g/dL    Total Bilirubin 0.6 0.0 - 1.2 mg/dL    Alkaline Phosphatase 94 39 - 117 U/L    AST (SGOT) 21 1 - 40 U/L    ALT (SGPT) 26 1 - 41 U/L   Lipid Panel With  LDL/HDL Ratio    Specimen: Blood   Result Value Ref Range    Total Cholesterol 183 0 - 200 mg/dL    Triglycerides 93 0 - 150 mg/dL    HDL Cholesterol 48 40 - 60 mg/dL    VLDL Cholesterol Remy 17 5 - 40 mg/dL    LDL Chol Calc (NIH) 118 (H) 0 - 100 mg/dL    LDL/HDL RATIO 2.43    TSH    Specimen: Blood   Result Value Ref Range    TSH 1.190 0.270 - 4.200 uIU/mL   PSA Screen    Specimen: Blood   Result Value Ref Range    PSA 1.770 0.000 - 4.000 ng/mL   CBC and Differential    Specimen: Blood   Result Value Ref Range    WBC 7.00 3.40 - 10.80 10*3/mm3    RBC 4.66 4.14 - 5.80 10*6/mm3    Hemoglobin 14.6 13.0 - 17.7 g/dL    Hematocrit 43.6 37.5 - 51.0 %    MCV 93.6 79.0 - 97.0 fL    MCH 31.3 26.6 - 33.0 pg    MCHC 33.5 31.5 - 35.7 g/dL    RDW 12.9 12.3 - 15.4 %    Platelets 260 140 - 450 10*3/mm3    Neutrophil Rel % 57.7 42.7 - 76.0 %    Lymphocyte Rel % 28.3 19.6 - 45.3 %    Monocyte Rel % 8.4 5.0 - 12.0 %    Eosinophil Rel % 4.0 0.3 - 6.2 %    Basophil Rel % 1.3 0.0 - 1.5 %    Neutrophils Absolute 4.04 1.70 - 7.00 10*3/mm3    Lymphocytes Absolute 1.98 0.70 - 3.10 10*3/mm3    Monocytes Absolute 0.59 0.10 - 0.90 10*3/mm3    Eosinophils Absolute 0.28 0.00 - 0.40 10*3/mm3    Basophils Absolute 0.09 0.00 - 0.20 10*3/mm3    Immature Granulocyte Rel % 0.3 0.0 - 0.5 %    Immature Grans Absolute 0.02 0.00 - 0.05 10*3/mm3    nRBC 0.0 0.0 - 0.2 /100 WBC       A1C:No results for input(s): HGBA1C in the last 32185 hours.  GLUCOSE:  Lab Results - Last 18 Months   Lab Units 11/01/21  0940 06/15/21  1327   GLUCOSE mg/dL 91 86     LIPID:  Lab Results - Last 18 Months   Lab Units 11/01/21  0940   CHOLESTEROL mg/dL 183   LDL CHOL mg/dL 118*   HDL CHOL mg/dL 48   TRIGLYCERIDES mg/dL 93     PSA:  Lab Results - Last 18 Months   Lab Units 11/01/21  0940   PSA ng/mL 1.770       CBC:  Lab Results - Last 18 Months   Lab Units 11/01/21  0940 06/15/21  1327   WBC 10*3/mm3 7.00 11.27*   HEMOGLOBIN g/dL 14.6 14.1   HEMATOCRIT % 43.6 41.8   PLATELETS  "10*3/mm3 260 242      BMP/CMP:  Lab Results - Last 18 Months   Lab Units 11/01/21  0940 06/15/21  1327   SODIUM mmol/L 140 136   POTASSIUM mmol/L 4.5 4.1   CHLORIDE mmol/L 105 102   TOTAL CO2 mmol/L 28.3 27.3   GLUCOSE mg/dL 91 86   BUN mg/dL 12 12   CREATININE mg/dL 0.92 0.93   EGFR IF NONAFRICN AM mL/min/1.73 82 81   EGFR IF AFRICN AM mL/min/1.73 99 98   CALCIUM mg/dL 9.2 8.9     HEPATIC:  Lab Results - Last 18 Months   Lab Units 11/01/21  0940 06/15/21  1327   ALT (SGPT) U/L 26 16   AST (SGOT) U/L 21 15   ALK PHOS U/L 94 97     Vit D:No results for input(s): TBRH63RI in the last 70529 hours.  THYROID:  Lab Results - Last 18 Months   Lab Units 11/01/21  0940 06/15/21  1327   TSH uIU/mL 1.190 1.050       Objective   /84   Pulse 68   Temp 97.3 °F (36.3 °C) (Infrared)   Resp 18   Ht 175.3 cm (69\")   Wt 90 kg (198 lb 6.4 oz)   SpO2 96%   BMI 29.30 kg/m²   Body mass index is 29.3 kg/m².    Recent Vitals       8/26/2022 8/26/2022 11/7/2022       BP: 111/71 130/66 158/84     Pulse: 61 66 68     Temp: -- -- 97.3 °F (36.3 °C)     Weight: -- -- 90 kg (198 lb 6.4 oz)     BMI (Calculated): -- -- 29.3         Wt Readings from Last 15 Encounters:   11/07/22 0848 90 kg (198 lb 6.4 oz)   08/15/22 0832 89.8 kg (198 lb)   11/01/21 0955 89.4 kg (197 lb)   06/22/21 1546 86.6 kg (190 lb 14.7 oz)   06/17/21 1337 86.6 kg (191 lb)   06/15/21 1333 86.6 kg (191 lb)   09/30/20 1058 84.8 kg (187 lb)   03/27/19 0927 88 kg (194 lb)   04/30/18 1041 83.5 kg (184 lb)   07/31/17 0810 78.9 kg (174 lb)   04/13/17 1316 83.9 kg (185 lb)       Physical Exam  GENERAL:  Well nourished/developed in no acute distress.   SKIN: Turgor excellent, without wound, rash, lesion.   HEENT: Normal cephalic without trauma.  Pupils equal round reactive to light. Extraocular motions full without nystagmus.   External canals nonobstructive nontender without reddness. Tymphatic membranes roque with iveth structures intact.   Oral cavity without growths, " exudates, and moist.  Posterior pharynx without mass, obstruction, redness.  No thyromegaly, mass, tenderness, lymphadenopathy and supple.  CV: Regular rhythm.  No murmur, gallop,  edema. Posterior pulses intact.  No carotid bruits.  CHEST: No chest wall tenderness or mass.   LUNGS: Symmetric motion with clear to auscultation.    ABD: Soft, nontender without mass.   PROSTATE: No visible/palpable lesion/tenderness and anal tone intact/full.  Prostate 20 gm/smooth and nontender.  No rectal mass within reach. Stool on glove brown.   ORTHO: Symmetric extremities without swelling/point tenderness.  Full gross range of motion.  NEURO: CN 2-12 grossly intact.  Symmetric facies and UE/LE. 4/5 strength throughout. 1/4 x bicep knee equal reflexe.  Nonfocal use extremities. Speech clear.  Intact light touch with monofilament, vibratory sensation with tuning fork; equal toes/distal feet.    PSYCH: Oriented x 3.  Pleasant calm, well kept.  Purposeful/directed conservation with intact short/long gross memory.           Assessment & Plan     1. Allergic rhinitis, unspecified seasonality, unspecified trigger    2. Mixed hyperlipidemia    3. Primary hypertension    4. Cardiac arrhythmia, unspecified cardiac arrhythmia type    5. Anticoagulated prevention+DJD/(), ASA 81x2    6. Gastroesophageal reflux disease, unspecified whether esophagitis present    7. Prostatism    8. Wellness examination-done    9. Primary generalized (osteo)arthritis    10. Right lower quadrant abdominal pain    11. Elevated blood pressure reading    12. Actinic keratosis    13. Skin lesion        Issues that are new, uncontrolled, or required review HPI/ROS/exam and decisions beyond wellness today:   Skin issue needed review; advised lesions benign  Abdominal discomfort not that suggestive urinary; but UA pending and will consider after  bp not possibly as controlled as needed; after labs expect/? More Rx    Discussions/medical decisions/reviews:  BP  initial elevated; 2nd better  Other vitals ok  DM/BS 91 11.1.21  Lipid  11.1.21  PSA ok 11.1.21  CBC ok 11.1.21  Renal ok 11.1.21  Liver ok 11.1.21  Vit D sometime  Thyroid TSH ok 11.1.21    Data review above:   Rx: reviewed and decisions:   Rx new/changes: none yet  No orders of the defined types were placed in this encounter.    covid shot since 4m ok  If any skin lesion  Grows in size; especially greater than pencil eraser  Changes in color  Becomes ulcerated/bleeds  Itches/hurts  Or changes; let us know  Labs today; including urine.   ? Gallbladder area issues  Watch home bps; considering lab may increase Rx    Orders placed:   LAB/Testing/Referrals: reviewed/orders:   Today:   No orders of the defined types were placed in this encounter.    Chronic/recurrent labs above or change to:   Same    Wellness/or annual done   Screening reviewed/updated     Immunization History   Administered Date(s) Administered   • COVID-19 (MODERNA) 1st, 2nd, 3rd Dose Only 03/18/2021, 04/15/2021   • COVID-19 (MODERNA) BOOSTER 12/24/2021   • Fluad Quad 65+ 09/30/2020, 10/15/2021   • Fluzone High Dose =>65 Years ([facility] ONLY) 10/01/2018   • Pneumococcal Conjugate 13-Valent (PCV13) 10/15/2021   • Td 04/14/2008   • Tdap 09/30/2020     Vaccine reviewed: today none; later we advised/reaffirmed our support/suggestion for staying complete with covid- covid boosters, seasonal flu/yearly and any missing vaccine from list we supplied; we suggest contact with local health department office to review missing/needed vaccines and then bring nursing documentation for these vaccines to this office.     Health maintenance:   Body mass index is 29.3 kg/m².  BMI is >= 25 and <30. (Overweight) The following options were offered after discussion;: exercise counseling/recommendations and nutrition counseling/recommendations      Tobacco use reviewed:   Erwin Shah Jr.  reports that he has never smoked. He has never used smokeless tobacco..      Annual/wellness also done today.  Issues as appropriate discussed as counseling, anticipatory guidance:   Nutrition, physical activity, healthy weight, injury prevention, misuse of tobacco, alcohol and drugs, sexual behavior and STDs, contraception, dental health, mental health, immunizations, screenings as appropriate. As appropriate see AVS.      Patient Instructions     Your blood pressure was higher than what we really wanted for you today at 150 range.  It is often a problem for blood pressures in the doctor's office to be higher than home (called white coat syndrome).   Goals for your blood pressure are 130-139 range top and 80-89 range bottom and you feeling ok.     We really advise rechecking your blood pressure at home (or with a friend) daily to every other day for the next several days and let us know if your pattern is not the goals above.  If you will do this make sure you control variables that can make your blood pressure show higher than it really is:   A. Use a machine that measures your blood pressure at the upper arm level; not wrist or lower  B. Take off any shirts/garmets and apply the cuff to your bare arm  C. Be sure and rest your arm being used on a table/like so it is totally supported  D. Do not cross your legs while taking your blood pressure  E. Be calm, rested and not upset/anxious in any way while taking your blood pressure  F. Avoid talking while taking the blood pressure  G. Be sure your back is supported and not in a strain while taking your blood pressure.     If you cannot do this at home/with a friend OR want it done here we are happy to make appointments here for that (we only charge/bill for a nurse visit for doing this).      Please CALL US if your blood pressure stays up as that probably means you have a problem; we likely will adjust things even over the phone.  We want your blood pressure to be normal.     OMRON is a reliable/common home blood automatic blood pressure  "device that can range around 50-75$ and most of this brand is certified by the government.  A good model would be Omron model 7.  Many chain brands ([facility], etc) if you look will say they are made by [id number].     ########################     Medicare/insurances offer certain visits called \"wellness/annual\" that allows for time to deal with and  review the many aspects of \"being well\" that just might not get mentioned during other visits with your doctor through the year.  This includes things like reviews of health screenings (mammograms, various labs),  weight, exercise, vaccines for just a few examples.      In order to help you with this we wish to make you aware of a few things for you to consider:    1. Advanced directives.  These are documents used to help direct your care if your health/situation should reach a point that you cannot make your own decisions.  While it is likely you do not currently have a need for these documents now; it is something that we all might face at any time.   The hand outs you are being given today are simply for you to review and use to learn more about these documents and consider them as you wish.      2. Vaccines: Certain vaccines are important after age 50, 60, and 65 and some health situations (for example COPD), require even boosters beyond age 65.  We are happy to review with you your vaccine status and vaccines that might be needed for you at this point:      a. Tetanus.   Like anyone this needs to given every 10 years; sooner for/with lacerations/wounds.   Likely when getting this booster it needs to be a tetanus called Tdap (tetanus mixed with diptheria and pertussis).   Years ago you had this vaccine.  We now know we can lose our immunity to pertussis (a part of this vaccine) and run a risk of catching this.  Now only would this make us ill; but more importantly we can spread this to very young children (and for them it can be a much more dangerous illness).   We " call this the grandparent vaccine for this reason.     b. Pneumonia (strept).   This comes now with two brands.   It is recommended to take pneumovax first; and a year later take the cousin prevnar.  Even if you have had these before; we need to review when and your current health situation/s as you may need boosters and even recently the CDC has made recent/new recommendations for pneumovax.      c. Shingles.  You do not want to catch shingles.  Though you will recover from this; the pain associated with shingles can be severe.  Even if you have had the now older zostavax, or have had shingles; it is recommended you still get the Shingrix (the new vaccine just available early 2018 shingles vaccine).  A new shingles vaccine (a shot to lower your chance of catching shingles) is now available (shingrix).  This vaccine is the second vaccine created for this purpose; (we have had zostavax for years).  Shingrix provides a much better and longer immunity for shingles than zostavax.  For this and other reasons Shingrix can be started at age 50.  If you have had zostavax in the past; you can still take Shingrix.      This vaccine is not paid for in a doctor's office by medicare, medicaid and probably most insurances.  Like zostavax; this is covered in drug stores.  This is a vaccine that if you chose to get you need to get at a drug store that gives vaccines (like [facility], [facility] and [facility].      d. Yearly flu vaccine given from September through April each year (there is a special vaccine for those over 65).     e. Travel vaccines:  If you are one to do international travel; be sure and ask us for any particular unusual vaccines you may need.     f.  Miscellaneous:  If you have certain health situations/disease you may need specific/particular vaccines not give to the general public.     g.  Covid: currently recommended everyone over 5.  The brands Pfizer/Moderna are for 3 total shots as immunity  will wane from less than this.  Darnell/Darnell has a version that comes with recommendations for an initial vaccine and booster after.  I no longer recommend J&J as a first choice as Pfizer/Moderna are readily available.  If you have had an initial J&J I recommend you booster with Moderna.   I strongly recommend covid vaccination; being unvaccinated or partially vaccinated carries real risk for disease and even death.     Because of many restrictions on this office always having all the above vaccines; you may be advised to work with your local health department to keep up with your individual vaccine needs.    The vaccines we have on record for you include:   Immunization History   Administered Date(s) Administered   • COVID-19 (MODERNA) 1st, 2nd, 3rd Dose Only 03/18/2021, 04/15/2021   • COVID-19 (MODERNA) BOOSTER 12/24/2021   • Fluad Quad 65+ 09/30/2020, 10/15/2021   • Fluzone High Dose =>65 Years ([facility] ONLY) 10/01/2018   • Pneumococcal Conjugate 13-Valent (PCV13) 10/15/2021   • Td 04/14/2008   • Tdap 09/30/2020       If you have record of other vaccines and want them to show in your chart here; please talk to our nurses about having your vaccine record updated.     3. Exercise: regular cardio exercise something everyone should consider and try to do; even if health limitations (ie find that exercise UE/LE/cardio that they can tolerate).   Normal weight a goal for everyone (as we discussed)    4. Healthy diet helpful for weight management, illness prevention.     5. If over 50-screening exams include men PSA/rectal exam, women mammograms, and everyone colonoscopy screening for colon cancer.    6. If you use tobacco of any kind or e-products you should stop. We are providing you some information to consider that could make this process easier.      ##################################              Visit today involved chronic significant medical problems or differentials and/or intensive drug monitoring: ie  potential to cause serious morbidity or death:     Follow up: Return for dr carson 6-12m.  Future Appointments   Date Time Provider Department Center   5/15/2023  9:00 AM Isidro Carson MD [facility]               Subsequent Medicare Wellness Visit    HEALTH RISK ASSESSMENT    : 1950    Recent Hospitalizations:  No hospitalization(s) within the last year..  [facility]      Current Medical Providers:  Patient Care Team:  Isidro Carson MD as PCP - General (Family Medicine)  Ajith Perez MD as Consulting Physician (Gastroenterology)        Smoking Status:  Social History     Tobacco Use   Smoking Status Never   Smokeless Tobacco Never       Alcohol Consumption:  Social History     Substance and Sexual Activity   Alcohol Use Yes   • Alcohol/week: 5.0 standard drinks   • Types: 2 Cans of beer, 3 Shots of liquor per week    Comment: occasional       Depression Screen:   PHQ-2/PHQ-9 Depression Screening 2022   Retired PHQ-9 Total Score -   Retired Total Score -   Little Interest or Pleasure in Doing Things 0-->not at all   Feeling Down, Depressed or Hopeless 0-->not at all   PHQ-9: Brief Depression Severity Measure Score 0   If You Checked Off Any Problems, How Difficult Have These Problems Made It For You to Do Your Work, Take Care of Things at Home, or Get Along with Other People? not difficult at all       Health Habits and Functional and Cognitive Screening:  Functional & Cognitive Status 2022   Do you have difficulty preparing food and eating? No   Do you have difficulty bathing yourself, getting dressed or grooming yourself? No   Do you have difficulty using the toilet? No   Do you have difficulty moving around from place to place? No   Do you have trouble with steps or getting out of a bed or a chair? No   Current Diet Well Balanced Diet   Dental Exam Up to date   Eye Exam Up to date   Exercise (times per week) 3 times per week   Current Exercises Include Treadmill;Weightlifting    Do you need help using the phone?  No   Are you deaf or do you have serious difficulty hearing?  Yes   Do you need help with transportation? No   Do you need help shopping? No   Do you need help preparing meals?  No   Do you need help with housework?  No   Do you need help with laundry? No   Do you need help taking your medications? No   Do you need help managing money? No   Do you ever drive or ride in a car without wearing a seat belt? No   Have you felt unusual stress, anger or loneliness in the last month? No   Who do you live with? Other   If you need help, do you have trouble finding someone available to you? No   Have you been bothered in the last four weeks by sexual problems? No   Do you have difficulty concentrating, remembering or making decisions? No           Does the patient have evidence of cognitive impairment? No    Asiprin use counseling: Taking ASA appropriately as indicated        Age-appropriate Screening Schedule:  Refer to the list below for future screening recommendations based on patient's age, sex and/or medical conditions. Orders for these recommended tests are listed in the plan section. The patient has been provided with a written plan.    Health Maintenance   Topic Date Due   • ZOSTER VACCINE (1 of 2) Never done   • LIPID PANEL  11/01/2022   • TDAP/TD VACCINES (3 - Td or Tdap) 09/30/2030   • INFLUENZA VACCINE  Completed        Subjective   History of Present Illness    Patient Active Problem List   Diagnosis   • Wellness examination-done   • Gastroesophageal reflux disease   • Hyperlipidemia-offered tx   • Hypertension   • Primary generalized (osteo)arthritis   • Laboratory test   • Anticoagulated prevention+DJD/(), ASA 81x2   • Allergic rhinitis   • Vertigo-? allergy related   • Prostatism   • Skin lesion   • Actinic keratosis   • Cardiac arrhythmia: bPVC   • Diastolic dysfunction   • History of adenomatous polyp of colon       Advance Care Planning:  ACP discussion was held  with the patient during this visit. Patient has an advance directive (not in EMR), copy requested.    Identification of Risk Factors:  Risk factors include: Colon Cancer Screening  Immunizations Discussed/Encouraged (specific immunizations; Tdap, Pneumococcal 23 and COVID19 )  Prostate Cancer Screening .    Compared to one year ago, the patient feels his physical health is the same.  Compared to one year ago, the patient feels his mental health is the same.      Assessment & Plan   Patient Self-Management and Personalized Health Advice  The patient has been provided with information about: exercise and weight management and preventive services including:   · Annual Wellness Visit (AWV).    Reviewed use of high risk medication in the elderly: not applicable  Reviewed for potential of harmful drug interactions in the elderly: not applicable    An After Visit Summary and PPPS with all of these plans were given to the patient.

## 2022-11-08 RX ORDER — ATORVASTATIN CALCIUM 20 MG/1
20 TABLET, FILM COATED ORAL DAILY
Qty: 90 TABLET | Refills: 3 | Status: SHIPPED | OUTPATIENT
Start: 2022-11-08

## 2022-11-14 ENCOUNTER — TELEPHONE (OUTPATIENT)
Dept: FAMILY MEDICINE CLINIC | Facility: CLINIC | Age: 72
End: 2022-11-14

## 2022-11-14 RX ORDER — CHLORAL HYDRATE 500 MG
1 CAPSULE ORAL
COMMUNITY

## 2022-11-14 NOTE — TELEPHONE ENCOUNTER
Called pt and wants to try to work on diet and exercise before starting the statin and add fish oil to his list to take

## 2022-11-14 NOTE — TELEPHONE ENCOUNTER
Called weekend  Questions on his bp Rx since last visit  Got lipid Rx; ? bp Rx as was niot at drug store  .   (ro was to see lab first and make deicison-probably forgot with looking at renal/K to write HCTZ below)      Current Outpatient Medications:   •  aspirin  MG tablet, Take 325 mg by mouth Daily., Disp: , Rfl:   •  atorvastatin (Lipitor) 20 MG tablet, Take 1 tablet by mouth Daily., Disp: 90 tablet, Rfl: 3  •  enalapril (VASOTEC) 20 MG tablet, TAKE 1 TABLET BY MOUTH DAILY. MUST KEEP APPT, Disp: 90 tablet, Rfl: 2  •  Loratadine 10 MG capsule, Take 1 capsule by mouth Daily., Disp: , Rfl:   •  meclizine (ANTIVERT) 25 MG tablet, TAKE 1 TABLET BY MOUTH 3 (THREE) TIMES A DAY AS NEEDED FOR DIZZINESS. (Patient taking differently: Take 1 tablet by mouth As Needed for Dizziness (rare).), Disp: 90 tablet, Rfl: 1    Let pt know  Add HCTZ 12.5 mg qd #90 x 2

## 2022-11-18 ENCOUNTER — TELEPHONE (OUTPATIENT)
Dept: FAMILY MEDICINE CLINIC | Facility: CLINIC | Age: 72
End: 2022-11-18

## 2022-11-18 DIAGNOSIS — I10 ESSENTIAL HYPERTENSION: Primary | ICD-10-CM

## 2022-11-18 DIAGNOSIS — I10 ESSENTIAL HYPERTENSION: Chronic | ICD-10-CM

## 2022-11-18 RX ORDER — HYDROCHLOROTHIAZIDE 12.5 MG/1
12.5 TABLET ORAL DAILY
Qty: 30 TABLET | Refills: 5 | Status: SHIPPED | OUTPATIENT
Start: 2022-11-18 | End: 2022-11-21 | Stop reason: SDUPTHER

## 2022-11-18 RX ORDER — ENALAPRIL MALEATE 20 MG/1
20 TABLET ORAL DAILY
Qty: 90 TABLET | Refills: 2 | Status: SHIPPED | OUTPATIENT
Start: 2022-11-18

## 2022-11-18 NOTE — TELEPHONE ENCOUNTER
Rx Refill Note  Requested Prescriptions     Pending Prescriptions Disp Refills   • enalapril (VASOTEC) 20 MG tablet [Pharmacy Med Name: ENALAPRIL MALEATE 20 MG TAB] 90 tablet 2     Sig: TAKE 1 TABLET BY MOUTH DAILY. MUST KEEP APPT      Last office visit with prescribing clinician: 11/7/2022      Next office visit with prescribing clinician: 5/15/2023            Soldead Yates MA  11/18/22, 14:07 CST

## 2022-11-19 NOTE — TELEPHONE ENCOUNTER
Confused why promised HCTZ 12.5 mg not at [facility]    Sent #30 x5    Also, advised do not see fish oil doing what Lipitor 20 will do; suggest his concern with a statin should be tapered by the benefits and at least give a statin a trial

## 2022-11-21 DIAGNOSIS — I10 ESSENTIAL HYPERTENSION: ICD-10-CM

## 2022-11-21 RX ORDER — HYDROCHLOROTHIAZIDE 12.5 MG/1
12.5 TABLET ORAL DAILY
Qty: 90 TABLET | Refills: 3 | Status: SHIPPED | OUTPATIENT
Start: 2022-11-21

## 2022-11-21 NOTE — TELEPHONE ENCOUNTER
"Vm \"I didn't get my other medication, and need it to be 90 days\"    Rx Refill Note  Requested Prescriptions     Pending Prescriptions Disp Refills   • hydroCHLOROthiazide (HYDRODIURIL) 12.5 MG tablet 90 tablet 3     Sig: Take 1 tablet by mouth Daily.      Last office visit with prescribing clinician: 11/7/2022      Next office visit with prescribing clinician: 5/15/2023            Heather Salazar LPN  11/21/22, 10:00 CST  "

## 2023-01-14 ENCOUNTER — TELEPHONE (OUTPATIENT)
Dept: FAMILY MEDICINE CLINIC | Facility: CLINIC | Age: 73
End: 2023-01-14
Payer: MEDICARE

## 2023-01-14 DIAGNOSIS — J01.90 ACUTE NON-RECURRENT SINUSITIS, UNSPECIFIED LOCATION: Primary | ICD-10-CM

## 2023-01-14 RX ORDER — AZITHROMYCIN 250 MG/1
TABLET, FILM COATED ORAL
Qty: 6 TABLET | Refills: 0 | Status: SHIPPED | OUTPATIENT
Start: 2023-01-14 | End: 2023-03-20

## 2023-01-14 NOTE — TELEPHONE ENCOUNTER
ON CALL    3 days worsening sinus congestion/drainage, facial soreness without dental issues.  No sore throat, dysphagia.  MInimal cough without SOB, chest pain, hemoptysis.   Home covid negative.  No HA, body aches.      [facility]

## 2023-03-19 ENCOUNTER — HOSPITAL ENCOUNTER (EMERGENCY)
Facility: HOSPITAL | Age: 73
Discharge: HOME | End: 2023-03-20
Attending: STUDENT IN AN ORGANIZED HEALTH CARE EDUCATION/TRAINING PROGRAM | Admitting: STUDENT IN AN ORGANIZED HEALTH CARE EDUCATION/TRAINING PROGRAM
Payer: MEDICARE

## 2023-03-19 ENCOUNTER — APPOINTMENT (OUTPATIENT)
Dept: GENERAL RADIOLOGY | Facility: HOSPITAL | Age: 73
End: 2023-03-19
Payer: MEDICARE

## 2023-03-19 DIAGNOSIS — I10 ESSENTIAL HYPERTENSION: ICD-10-CM

## 2023-03-19 DIAGNOSIS — R00.2 PALPITATIONS: ICD-10-CM

## 2023-03-19 DIAGNOSIS — R07.9 ACUTE CHEST PAIN: Primary | ICD-10-CM

## 2023-03-19 DIAGNOSIS — I49.3 MULTIPLE PREMATURE VENTRICULAR COMPLEXES: ICD-10-CM

## 2023-03-19 DIAGNOSIS — Z86.79 HISTORY OF HYPERTENSION: ICD-10-CM

## 2023-03-19 LAB
ALBUMIN SERPL-MCNC: 4.3 G/DL (ref 3.5–5.2)
ALBUMIN/GLOB SERPL: 1.4 G/DL
ALP SERPL-CCNC: 98 U/L (ref 39–117)
ALT SERPL W P-5'-P-CCNC: 40 U/L (ref 1–41)
ANION GAP SERPL CALCULATED.3IONS-SCNC: 10 MMOL/L (ref 5–15)
AST SERPL-CCNC: 29 U/L (ref 1–40)
BASOPHILS # BLD AUTO: 0.07 10*3/MM3 (ref 0–0.2)
BASOPHILS NFR BLD AUTO: 0.6 % (ref 0–1.5)
BILIRUB SERPL-MCNC: 0.4 MG/DL (ref 0–1.2)
BUN SERPL-MCNC: 13 MG/DL (ref 8–23)
BUN/CREAT SERPL: 15.1 (ref 7–25)
CALCIUM SPEC-SCNC: 9.1 MG/DL (ref 8.6–10.5)
CHLORIDE SERPL-SCNC: 104 MMOL/L (ref 98–107)
CO2 SERPL-SCNC: 25 MMOL/L (ref 22–29)
CREAT SERPL-MCNC: 0.86 MG/DL (ref 0.76–1.27)
DEPRECATED RDW RBC AUTO: 44.5 FL (ref 37–54)
EGFRCR SERPLBLD CKD-EPI 2021: 93.1 ML/MIN/1.73
EOSINOPHIL # BLD AUTO: 0.15 10*3/MM3 (ref 0–0.4)
EOSINOPHIL NFR BLD AUTO: 1.3 % (ref 0.3–6.2)
ERYTHROCYTE [DISTWIDTH] IN BLOOD BY AUTOMATED COUNT: 13.2 % (ref 12.3–15.4)
GEN 5 2HR TROPONIN T REFLEX: 12 NG/L
GLOBULIN UR ELPH-MCNC: 3.1 GM/DL
GLUCOSE SERPL-MCNC: 104 MG/DL (ref 65–99)
HCT VFR BLD AUTO: 41.3 % (ref 37.5–51)
HGB BLD-MCNC: 13.9 G/DL (ref 13–17.7)
HOLD SPECIMEN: NORMAL
HOLD SPECIMEN: NORMAL
IMM GRANULOCYTES # BLD AUTO: 0.05 10*3/MM3 (ref 0–0.05)
IMM GRANULOCYTES NFR BLD AUTO: 0.4 % (ref 0–0.5)
LYMPHOCYTES # BLD AUTO: 2.43 10*3/MM3 (ref 0.7–3.1)
LYMPHOCYTES NFR BLD AUTO: 21.7 % (ref 19.6–45.3)
MCH RBC QN AUTO: 31.2 PG (ref 26.6–33)
MCHC RBC AUTO-ENTMCNC: 33.7 G/DL (ref 31.5–35.7)
MCV RBC AUTO: 92.6 FL (ref 79–97)
MONOCYTES # BLD AUTO: 0.77 10*3/MM3 (ref 0.1–0.9)
MONOCYTES NFR BLD AUTO: 6.9 % (ref 5–12)
NEUTROPHILS NFR BLD AUTO: 69.1 % (ref 42.7–76)
NEUTROPHILS NFR BLD AUTO: 7.73 10*3/MM3 (ref 1.7–7)
NRBC BLD AUTO-RTO: 0 /100 WBC (ref 0–0.2)
PLATELET # BLD AUTO: 254 10*3/MM3 (ref 140–450)
PMV BLD AUTO: 10.2 FL (ref 6–12)
POTASSIUM SERPL-SCNC: 4.4 MMOL/L (ref 3.5–5.2)
PROT SERPL-MCNC: 7.4 G/DL (ref 6–8.5)
RBC # BLD AUTO: 4.46 10*6/MM3 (ref 4.14–5.8)
SODIUM SERPL-SCNC: 139 MMOL/L (ref 136–145)
TROPONIN T DELTA: 1 NG/L
TROPONIN T SERPL HS-MCNC: 11 NG/L
WBC NRBC COR # BLD: 11.2 10*3/MM3 (ref 3.4–10.8)
WHOLE BLOOD HOLD COAG: NORMAL
WHOLE BLOOD HOLD SPECIMEN: NORMAL

## 2023-03-19 PROCEDURE — 99284 EMERGENCY DEPT VISIT MOD MDM: CPT

## 2023-03-19 PROCEDURE — 36415 COLL VENOUS BLD VENIPUNCTURE: CPT | Performed by: STUDENT IN AN ORGANIZED HEALTH CARE EDUCATION/TRAINING PROGRAM

## 2023-03-19 PROCEDURE — 80053 COMPREHEN METABOLIC PANEL: CPT

## 2023-03-19 PROCEDURE — 71045 X-RAY EXAM CHEST 1 VIEW: CPT

## 2023-03-19 PROCEDURE — 84439 ASSAY OF FREE THYROXINE: CPT | Performed by: FAMILY MEDICINE

## 2023-03-19 PROCEDURE — 84484 ASSAY OF TROPONIN QUANT: CPT

## 2023-03-19 PROCEDURE — 84484 ASSAY OF TROPONIN QUANT: CPT | Performed by: STUDENT IN AN ORGANIZED HEALTH CARE EDUCATION/TRAINING PROGRAM

## 2023-03-19 PROCEDURE — 93010 ELECTROCARDIOGRAM REPORT: CPT | Performed by: INTERNAL MEDICINE

## 2023-03-19 PROCEDURE — 84443 ASSAY THYROID STIM HORMONE: CPT | Performed by: FAMILY MEDICINE

## 2023-03-19 PROCEDURE — 85025 COMPLETE CBC W/AUTO DIFF WBC: CPT

## 2023-03-19 PROCEDURE — 93005 ELECTROCARDIOGRAM TRACING: CPT | Performed by: STUDENT IN AN ORGANIZED HEALTH CARE EDUCATION/TRAINING PROGRAM

## 2023-03-19 PROCEDURE — 93005 ELECTROCARDIOGRAM TRACING: CPT

## 2023-03-19 RX ORDER — SODIUM CHLORIDE 0.9 % (FLUSH) 0.9 %
10 SYRINGE (ML) INJECTION AS NEEDED
Status: DISCONTINUED | OUTPATIENT
Start: 2023-03-19 | End: 2023-03-20 | Stop reason: HOSPADM

## 2023-03-19 RX ORDER — ASPIRIN 81 MG/1
324 TABLET, CHEWABLE ORAL ONCE
Status: COMPLETED | OUTPATIENT
Start: 2023-03-19 | End: 2023-03-19

## 2023-03-19 RX ADMIN — ASPIRIN 324 MG: 81 TABLET, CHEWABLE ORAL at 22:35

## 2023-03-20 ENCOUNTER — OFFICE VISIT (OUTPATIENT)
Dept: FAMILY MEDICINE CLINIC | Facility: CLINIC | Age: 73
End: 2023-03-20
Payer: MEDICARE

## 2023-03-20 VITALS
DIASTOLIC BLOOD PRESSURE: 75 MMHG | HEART RATE: 69 BPM | HEIGHT: 69 IN | WEIGHT: 200 LBS | TEMPERATURE: 98.2 F | OXYGEN SATURATION: 95 % | BODY MASS INDEX: 29.62 KG/M2 | SYSTOLIC BLOOD PRESSURE: 159 MMHG | RESPIRATION RATE: 22 BRPM

## 2023-03-20 VITALS
HEART RATE: 49 BPM | WEIGHT: 202.3 LBS | BODY MASS INDEX: 29.96 KG/M2 | RESPIRATION RATE: 18 BRPM | HEIGHT: 69 IN | DIASTOLIC BLOOD PRESSURE: 74 MMHG | OXYGEN SATURATION: 96 % | SYSTOLIC BLOOD PRESSURE: 142 MMHG

## 2023-03-20 DIAGNOSIS — I49.9 CARDIAC ARRHYTHMIA, UNSPECIFIED CARDIAC ARRHYTHMIA TYPE: ICD-10-CM

## 2023-03-20 DIAGNOSIS — E78.2 MIXED HYPERLIPIDEMIA: Chronic | ICD-10-CM

## 2023-03-20 DIAGNOSIS — R07.9 CHEST PAIN, UNSPECIFIED TYPE: ICD-10-CM

## 2023-03-20 DIAGNOSIS — R03.0 ELEVATED BLOOD PRESSURE READING: ICD-10-CM

## 2023-03-20 DIAGNOSIS — I51.89 DIASTOLIC DYSFUNCTION: ICD-10-CM

## 2023-03-20 LAB
QT INTERVAL: 362 MS
QT INTERVAL: 390 MS
QTC INTERVAL: 417 MS
QTC INTERVAL: 450 MS
T4 FREE SERPL-MCNC: 1.09 NG/DL (ref 0.93–1.7)
TSH SERPL DL<=0.05 MIU/L-ACNC: 1.49 UIU/ML (ref 0.27–4.2)

## 2023-03-20 PROCEDURE — 99214 OFFICE O/P EST MOD 30 MIN: CPT | Performed by: FAMILY MEDICINE

## 2023-03-20 PROCEDURE — 3077F SYST BP >= 140 MM HG: CPT | Performed by: FAMILY MEDICINE

## 2023-03-20 PROCEDURE — 3078F DIAST BP <80 MM HG: CPT | Performed by: FAMILY MEDICINE

## 2023-03-20 NOTE — ED PROVIDER NOTES
"EMERGENCY DEPARTMENT ATTENDING NOTE    Patient Name: Erwin Shah Jr.    Chief Complaint   Patient presents with   • Chest Pain       PATIENT PRESENTATION:  Erwin Shah Jr. is a very plesant 72 y.o. male with history of hyperlipidemia and hypertension who presents emergency department due to elevated blood pressure and chest discomfort.    Patient states he started having chest pain around 1400 earlier today.  Describes it as a \"fluttering sensation \"under his left chest.  Denies any retrosternal or pressure aspect denies any tearing nature no radiation.  His family member is his primary care provider so he called Dr. Howell during symptom onset.  He had him check his blood pressure he states it was elevated in the 180s so he told him to take another one of his blood pressure pills.  He takes hydrochlorothiazide as well as enalapril.  States his symptoms did not go away so he presents to the emergency department.  Denies any shortness of breath nausea vomit abdominal pain.  No dyspnea on exertion.  No leg swelling.  He has a history of hypertension hyperlipidemia.  Denies any history of smoking no history of diabetes no family history myocardial infarction.  He had cardiac stress testing but not for a few years per his report.      PHYSICAL EXAM:   VS: /75 (BP Location: Right arm, Patient Position: Sitting)   Pulse 69   Temp 98.2 °F (36.8 °C) (Oral)   Resp 22   Ht 175.3 cm (69\")   Wt 90.7 kg (200 lb)   SpO2 95%   BMI 29.53 kg/m²   GENERAL: Well-appearing middle-age man sitting up stretcher no acute stress; well-nourished, well-developed, awake, alert, no acute distress, nontoxic appearing, comfortable  EYES: PERRL, sclerae anicteric, extraocular movements grossly intact, symmetric lids  EARS, NOSE, MOUTH, THROAT: atraumatic external nose and ears, moist mucous membranes  NECK: symmetric, trachea midline  RESPIRATORY: unlabored respiratory effort, clear to auscultation bilaterally, good air " movement  CARDIOVASCULAR: no murmurs, peripheral pulses 2+ and equal in all extremities  GI: soft, nontender, nondistended  MUSCULOSKELETAL/EXTREMITIES: extremities without obvious deformity  SKIN: warm and dry with no obvious rashes  NEUROLOGIC: moving all 4 extremities symmetrically, CN II-XII grossly intact  PSYCHIATRIC: alert, pleasant and cooperative. Appropriate mood and affect.      MEDICAL DECISION MAKING:    Erwin Shah Jr. is a 72 y.o. male presents emerged department elevated blood pressures and with chest discomfort and palpitations.    Differential Diagnosis Considered: STEMI, NSTEMI, cardiac arrhythmia, premature ventricular complexes, pneumothorax, pneumonia, hypertension, hypertensive urgency    Labs Ordered:  Labs Reviewed   COMPREHENSIVE METABOLIC PANEL - Abnormal; Notable for the following components:       Result Value    Glucose 104 (*)     All other components within normal limits    Narrative:     GFR Normal >60                  Chronic Kidney Disease <60                  Kidney Failure <15                     CBC WITH AUTO DIFFERENTIAL - Abnormal; Notable for the following components:    WBC 11.20 (*)     Neutrophils, Absolute 7.73 (*)     All other components within normal limits   TROPONIN - Normal    Narrative:     High Sensitive Troponin T Reference Range:                  <10.0 ng/L- Negative Female for AMI                  <15.0 ng/L- Negative Male for AMI                  >=10 - Abnormal Female indicating possible myocardial injury.                  >=15 - Abnormal Male indicating possible myocardial injury.                   Clinicians would have to utilize clinical acumen, EKG, Troponin, and serial changes to determine if it is an Acute Myocardial Infarction or myocardial injury due to an underlying chronic condition.                                        HIGH SENSITIVITIY TROPONIN T 2HR - Normal    Narrative:     High Sensitive Troponin T Reference Range:                  <10.0  ng/L- Negative Female for AMI                  <15.0 ng/L- Negative Male for AMI                  >=10 - Abnormal Female indicating possible myocardial injury.                  >=15 - Abnormal Male indicating possible myocardial injury.                   Clinicians would have to utilize clinical acumen, EKG, Troponin, and serial changes to determine if it is an Acute Myocardial Infarction or myocardial injury due to an underlying chronic condition.                                        RAINBOW DRAW    Narrative:     The following orders were created for panel order [place] Draw.                  Procedure                               Abnormality         Status                                     ---------                               -----------         ------                                     Green Top (Gel)[[id number]]                                  Final result                               Lavender Top[[id number]]                                     Final result                               Red Top[[id number]]                                          Final result                               Light Blue Top[[id number]]                                   Final result                                                 Please view results for these tests on the individual orders.   CBC AND DIFFERENTIAL    Narrative:     The following orders were created for panel order CBC & Differential.                  Procedure                               Abnormality         Status                                     ---------                               -----------         ------                                     CBC Auto Differential[[id number]]        Abnormal            Final result                                                 Please view results for these tests on the individual orders.   GREEN TOP   LAVENDER TOP   RED TOP   LIGHT BLUE TOP        Imaging Ordered:   XR Chest 1 View    (Results Pending)        Internal chart review:   Past Medical History:   Diagnosis Date   • Allergic    • Arthritis    • Asthma    • GERD (gastroesophageal reflux disease)    • HL (hearing loss)    • Hyperlipidemia    • Hypertension        Past Surgical History:   Procedure Laterality Date   • CHOLECYSTECTOMY     • COLONOSCOPY N/A 7/31/2017    Procedure: COLONOSCOPY;  Surgeon: Ajith Perez MD;  Location: [facility] ENDOSCOPY;  Service:    • COLONOSCOPY N/A 8/26/2022    Procedure: COLONOSCOPY WITH ANESTHESIA;  Surgeon: Ajith Perez MD;  Location: [facility] ENDOSCOPY;  Service: Gastroenterology;  Laterality: N/A;  pre hx polyp  post normal  Isidro Carson MD   • ENDOSCOPY  05/07/2010       No Known Allergies    No current facility-administered medications for this encounter.    Current Outpatient Medications:   •  aspirin  MG tablet, Take 325 mg by mouth Daily., Disp: , Rfl:   •  atorvastatin (Lipitor) 20 MG tablet, Take 1 tablet by mouth Daily., Disp: 90 tablet, Rfl: 3  •  azithromycin (ZITHROMAX) 250 MG tablet, Take 2 tablets the first day, then 1 tablet daily for 4 days., Disp: 6 tablet, Rfl: 0  •  enalapril (VASOTEC) 20 MG tablet, TAKE 1 TABLET BY MOUTH DAILY. MUST KEEP APPT, Disp: 90 tablet, Rfl: 2  •  hydroCHLOROthiazide (HYDRODIURIL) 12.5 MG tablet, Take 1 tablet by mouth Daily., Disp: 90 tablet, Rfl: 3  •  Loratadine 10 MG capsule, Take 1 capsule by mouth Daily., Disp: , Rfl:   •  meclizine (ANTIVERT) 25 MG tablet, TAKE 1 TABLET BY MOUTH 3 (THREE) TIMES A DAY AS NEEDED FOR DIZZINESS. (Patient taking differently: Take 1 tablet by mouth As Needed for Dizziness (rare).), Disp: 90 tablet, Rfl: 1  •  Omega-3 Fatty Acids (fish oil) 1000 MG capsule capsule, Take 1 capsule by mouth Daily With Breakfast., Disp: , Rfl:     External documents reviewed: Last cardiac stress testing back in 2021.    My EKG interpretation: Normal sinus rhythm with a rate of 69 with frequent premature ventricular complexes 3 and total on  EKG.    My lab interpretation: In high sensor troponin unremarkable at 11 and 12.  Only slight leukocytosis 11.20.  Normal hemoglobin.    My imaging interpretation: Chest x-ray with no acute findings.    Decision rules/scores evaluated: HEART score 4.     Shared decision making: Discussed results with patient.  I stated that his history and risk factors and age are concerning for possible unstable angina although his labs are negative so this is not an NSTEMI or STEMI today.  I explained to him that with his heart score there is at least a 70% chance that he will have a heart attack or major cardiac event within the next 30 days.  I stated that I would recommend admission for cardiac stress testing but he stated that he wanted to go home.  He understands the risks and will follow-up with his primary care provider tomorrow to order cardiac stress testing and continue work-up as an outpatient.    ED Course and Re-evaluation: 73yo M Presents emerged department with elevated blood pressure and chest discomfort.  On history of chest discomfort really sound like palpitations.  He has multiple PVCs on his EKG he has never been told he has PVCs I suspect that he is having symptomatic PVCs.  Patient however does has risk factors for acute coronary syndrome.  His initial EKG showed no acute ischemic findings.  2 negative troponins so NSTEMI is unlikely.  Discussed with patient that cannot rule out unstable angina he deftly has risk factors recommend stress testing but patient with shared decision making wants to follow-up with his primary care provider for stress testing which I think is reasonable so long as he returns and given return precautions and he understood the risk.  Otherwise no evidence of pneumonia or pneumothorax.  There is documented bradycardia but triage heart rate did not  due to PVCs I do not feel this is for bradycardia.  Patient was discharged home with plan to follow-up with his primary care  provider tomorrow as he already had already planned as well as cardiology within 1 week.  Given return precautions to the emergency department for worsening symptoms.      ED Diagnosis:  Acute chest pain; Essential hypertension; History of hypertension; Multiple premature ventricular complexes; Palpitations    Disposition: to home  Follow up plan: PCP follow up within 2 days, cardiology within 1 week, return to ED immediately if symptoms worsen        Signed:  Angelo Mares MD  Emergency Medicine Physician    Please note that portions of this note were completed with a voice recognition program.      Angelo Mares MD  03/20/23 0425

## 2023-03-20 NOTE — PROGRESS NOTES
Subjective   Erwin Shah Jr. is a 72 y.o. male presenting with chief complaint of:   Chief Complaint   Patient presents with   • Hypertension     Patient states that he did not feel well at all. Having issues with BP. Patient states that he was having heart flutters and has also had them this morning. Patient states that he was in the ED yesterday with PVC.      AWV 11.7.22    History of Present Illness :  Alone.  Here for primarily above.       Has multiple chronic problems to consider that might have a bearing on today's issues; somewhat an interval appointment.       Chronic/acute problems reviewed today:   1. Diastolic dysfunction last echo showed this.  Is been no leg edema   2. Cardiac arrhythmia, unspecified cardiac arrhythmia type chronic prior knowledge of PVCs thus far without significant noticeable palpitations and certainly no syncope near syncope.   3. Mixed hyperlipidemia Chronic/stable.  Tolerated use of Rx with labs showing improved lipid values and tolerant liver labs. No muscle aches unexpected.      4. Elevated blood pressure reading see #5   5. Chest pain, unspecified type he called as I was on call last night did not mention chest pain; he just mentioned his blood pressure elevated to 190 systolic range.  He was not using over-the-counter medications and continues use nonsteroidals.  He was anxious enough that he went to [facility] ER where he was given several aspirins testing with labs EKG and told to see me.     Has an/another acute issue today: none.    The following portions of the patient's history were reviewed and updated as appropriate: allergies, current medications, past family history, past medical history, past social history, past surgical history and problem list.      Current Outpatient Medications:   •  aspirin  MG tablet, Take 1 tablet by mouth Daily., Disp: , Rfl:   •  atorvastatin (Lipitor) 20 MG tablet, Take 1 tablet by mouth Daily., Disp: 90 tablet, Rfl: 3  •  enalapril  (VASOTEC) 20 MG tablet, TAKE 1 TABLET BY MOUTH DAILY. MUST KEEP APPT, Disp: 90 tablet, Rfl: 2  •  hydroCHLOROthiazide (HYDRODIURIL) 12.5 MG tablet, Take 1 tablet by mouth Daily., Disp: 90 tablet, Rfl: 3  •  Loratadine 10 MG capsule, Take 1 capsule by mouth Daily., Disp: , Rfl:   •  meclizine (ANTIVERT) 25 MG tablet, TAKE 1 TABLET BY MOUTH 3 (THREE) TIMES A DAY AS NEEDED FOR DIZZINESS. (Patient taking differently: Take 1 tablet by mouth As Needed for Dizziness (rare).), Disp: 90 tablet, Rfl: 1  •  Omega-3 Fatty Acids (fish oil) 1000 MG capsule capsule, Take 1 capsule by mouth Daily With Breakfast., Disp: , Rfl:   No current facility-administered medications for this visit.    No problems with medications.    No Known Allergies    Review of Systems  GENERAL:  Active/slower with limits, speed, stamina for age; minimal influence. Sleep is ok; apnea denied. No fever now.  SKIN: No rash/skin lesion of concern unless mentioned above/below.   ENDO:  No syncope, near or diaphoretic sweaty spells.  HEENT: No recent head injury; or headache.  No vision change.   No significant hearing loss.  Ears without pain/drainage.  No sore throat.  Occ on/off nasal/sinus congestion/drainage. No epistaxis.  CHEST: No chest wall tenderness or mass. No cough, without wheeze.  No SOB; no hemoptysis.  CV: No chest pain, palpitations, ankle edema.  GI: No heartburn, dysphagia.  No usual abdominal pain, diarrhea, constipation.  No rectal bleeding, or melena.    :  Voids without dysuria, or incontinence to completion.    ORTHO: No painful/swollen joints but various on /off sore.  No change occ sore neck or back.  No acute neck or back pain without recent injury.  NEURO: No dizziness, weakness of extremities.  No numbness/paresthesias.   PSYCH: No memory loss.  Mood good; not that anxious, depressed but/and not suicidal.  Tries to tolerate stress .   Screening:  Mammogram: NA  Bone density: NA  Low dose CT chest: non-smoker/NA  GI:    EGD+biop/[facility]/Shiben/5.7.10/3y  Colon-polyp/[facility]/Shieben/7.31.17/5y  Colon-nl/Shieben/BH/8.26.22/5y  Prostate: 11.7.22 -voiding ok  11.1.21 AUA same  9.30.20 AUA 4  3.27.19 AUA 2/1  Usual lab order  12m CBC, CMP, LIPID, TSH, PSAs      Copy/paste function used for ROS/exam AND each area of these were reviewed, updated, confirmed and supplemented as needed.  Data reviewed:   Recent admit/ER/MD visits: 11.7.22    1. Allergic rhinitis, unspecified seasonality, unspecified trigger    2. Mixed hyperlipidemia    3. Primary hypertension    4. Cardiac arrhythmia, unspecified cardiac arrhythmia type    5. Anticoagulated prevention+DJD/(), ASA 81x2    6. Gastroesophageal reflux disease, unspecified whether esophagitis present    7. Prostatism    8. Wellness examination-done    9. Primary generalized (osteo)arthritis    10. Right lower quadrant abdominal pain    11. Elevated blood pressure reading    12. Actinic keratosis    13. Skin lesion          Issues that are new, uncontrolled, or required review HPI/ROS/exam and decisions beyond wellness today:   Skin issue needed review; advised lesions benign  Abdominal discomfort not that suggestive urinary; but UA pending and will consider after  bp not possibly as controlled as needed; after labs expect/? More Rx     Discussions/medical decisions/reviews:  BP initial elevated; 2nd better  Other vitals ok  DM/BS 91 11.1.21  Lipid  11.1.21  PSA ok 11.1.21  CBC ok 11.1.21  Renal ok 11.1.21  Liver ok 11.1.21  Vit D sometime  Thyroid TSH ok 11.1.21     Data review above:   Rx: reviewed and decisions:   Rx new/changes: none yet  No orders of the defined types were placed in this encounter.     covid shot since 4m ok  If any skin lesion  Grows in size; especially greater than pencil eraser  Changes in color  Becomes ulcerated/bleeds  Itches/hurts  Or changes; let us know  Labs today; including urine.   ? Gallbladder area issues  Watch home bps; considering lab may increase  Rx    Last cardiac testing:   Echo:   Results for orders placed during the hospital encounter of 06/22/21    Adult Stress Echo W/ Cont or Stress Agent if Necessary Per Protocol    Interpretation Summary  · Agosto treadmill score of 9  · No ischemic changes during stress  · Frequent PVCs noted throughout the exam  · No clinical symptoms of ischemia during stress  · Excellent functional capacity at 10 metabolic equivalents  · Appropriate blood pressure and heart rate response to stress  · Segment augmentation had a normal response to stress. Cavity size behaved normally in response to stress.  · Normal stress echo consistent with a low risk study for myocardial ischemia.  · Overall this is a low risk test for ischemia. Frequent PVCs noted throughout the entire exam.    Radiology considered:   XR Chest 1 View    Result Date: 3/20/2023  1. No active cardiopulmonary disease. This report was finalized on 03/20/2023 06:20 by Dr. Ella Nugyen MD.    Lab Results:    A1C:No results for input(s): HGBA1C in the last 92748 hours.  GLUCOSE:  Lab Results - Last 18 Months   Lab Units 03/19/23 2146 11/07/22 0900 11/01/21  0940   GLUCOSE mg/dL 104* 94 91     LIPID:  Lab Results - Last 18 Months   Lab Units 11/07/22 0900 11/01/21  0940   CHOLESTEROL mg/dL 189 183   LDL CHOL mg/dL 121* 118*   HDL CHOL mg/dL 52 48   TRIGLYCERIDES mg/dL 89 93     PSA:  Lab Results   Component Value Date/Time    PSA 2.610 11/07/2022 09:00 AM    PSA 1.770 11/01/2021 09:40 AM    PSA 2.500 09/28/2020 07:47 AM    PSA 1.920 04/27/2018 07:39 AM    PSA 2.490 04/03/2017 07:17 AM       CBC:  Lab Results - Last 18 Months   Lab Units 03/19/23 2146 11/07/22  0900 11/01/21  0940   WBC 10*3/mm3 11.20* 7.69 7.00   HEMOGLOBIN g/dL 13.9 14.5 14.6   HEMATOCRIT % 41.3 43.6 43.6   PLATELETS 10*3/mm3 254 247 260      BMP/CMP:  Lab Results - Last 18 Months   Lab Units 03/19/23 2146 11/07/22  0900 11/01/21  0940   SODIUM mmol/L 139 142 140   POTASSIUM mmol/L 4.4 4.5 4.5  "  CHLORIDE mmol/L 104 106 105   TOTAL CO2 mmol/L  --  27.6 28.3   CO2 mmol/L 25.0  --   --    GLUCOSE mg/dL 104* 94 91   BUN mg/dL 13 13 12   CREATININE mg/dL 0.86 1.03 0.92   EGFR IF NONAFRICN AM mL/min/1.73  --   --  82   EGFR IF AFRICN AM mL/min/1.73  --   --  99   EGFR RESULT mL/min/1.73  --  78.6  --    CALCIUM mg/dL 9.1 9.3 9.2     HEPATIC:  Lab Results - Last 18 Months   Lab Units 03/19/23 2146 11/07/22  0900 11/01/21  0940   ALT (SGPT) U/L 40 27 26   AST (SGOT) U/L 29 24 21   ALK PHOS U/L 98 93 94     Vit D:No results for input(s): OXAM61OF in the last 91804 hours.  THYROID:  Lab Results - Last 18 Months   Lab Units 11/07/22  0900 11/01/21  0940   TSH uIU/mL 1.330 1.190   FREE T4 ng/dL  --   --        Objective   /74 (BP Location: Right arm, Patient Position: Sitting, Cuff Size: Adult)   Pulse (!) 49   Resp 18   Ht 175.3 cm (69\")   Wt 91.8 kg (202 lb 4.8 oz)   SpO2 96%   BMI 29.87 kg/m²   Body mass index is 29.87 kg/m².    Recent Vitals       3/19/2023 3/20/2023 3/20/2023       BP: 163/89 159/75 142/74     Pulse: 73 69 49     Temp: -- 98.2 °F (36.8 °C) --     Weight: -- -- 91.8 kg (202 lb 4.8 oz)     BMI (Calculated): -- -- 29.9         Wt Readings from Last 15 Encounters:   03/20/23 0933 91.8 kg (202 lb 4.8 oz)   03/19/23 2143 90.7 kg (200 lb)   11/07/22 0848 90 kg (198 lb 6.4 oz)   08/15/22 0832 89.8 kg (198 lb)   11/01/21 0955 89.4 kg (197 lb)   06/22/21 1546 86.6 kg (190 lb 14.7 oz)   06/17/21 1337 86.6 kg (191 lb)   06/15/21 1333 86.6 kg (191 lb)   09/30/20 1058 84.8 kg (187 lb)   03/27/19 0927 88 kg (194 lb)   04/30/18 1041 83.5 kg (184 lb)   07/31/17 0810 78.9 kg (174 lb)   04/13/17 1316 83.9 kg (185 lb)       Physical Exam  GENERAL:  Well nourished/developed in no acute distress.   SKIN: Turgor excellent, without wound, rash, lesion.   HEENT: Normal cephalic without trauma.  Pupils equal round reactive to light. Extraocular motions full without nystagmus.   External canals nonobstructive " nontender without reddness. Tymphatic membranes roque with iveth structures intact.   Oral cavity without growths, exudates, and moist.  Posterior pharynx without mass, obstruction, redness.  No thyromegaly, mass, tenderness, lymphadenopathy and supple.  CV: Regular rhythm.  No murmur, gallop,  edema. Posterior pulses intact.  No carotid bruits.  CHEST: No chest wall tenderness or mass.   LUNGS: Symmetric motion with clear to auscultation.    ABD: Soft, nontender without mass.   PROSTATE: No visible/palpable lesion/tenderness and anal tone intact/full.  Prostate 20 gm/smooth and nontender.  No rectal mass within reach. Stool on glove brown.   ORTHO: Symmetric extremities without swelling/point tenderness.  Full gross range of motion.  NEURO: CN 2-12 grossly intact.  Symmetric facies and UE/LE. 4/5 strength throughout. 1/4 x bicep knee equal reflexe.  Nonfocal use extremities. Speech clear.  Intact light touch with monofilament, vibratory sensation with tuning fork; equal toes/distal feet.    PSYCH: Oriented x 3.  Pleasant calm, well kept.  Purposeful/directed conservation with intact short/long gross memory.     Assessment & Plan     1. Diastolic dysfunction    2. Cardiac arrhythmia, unspecified cardiac arrhythmia type    3. Mixed hyperlipidemia    4. Elevated blood pressure reading    5. Chest pain, unspecified type        Data review above:   Discussions/medical decisions/reviews:  BP too high  Other vitals ok  DM/ 3.19.23  Lipid  11.7.22; lipitor 20  PSA ok 11.7.22  CBC ok 3.19.23  Renal ok 3.19.23  Liver ok 3.19.23  Vit D sometime  Thyroid TSH, fT4 ok 11.7.22    Screening reviewed/updated noted  ER if worsens while  Zio 7 days  EST/echo  Monitor home bps  Add TSH, fT4    Data review above:   Rx: reviewed and decisions:   Rx new/changes: none  No orders of the defined types were placed in this encounter.      Orders placed:   LAB/Testing/Referrals: reviewed/orders:   Today:   Orders Placed This  "Encounter   Procedures   • TSH   • T4, Free   • Holter Monitor - 72 Hour Up To 15 Days     Chronic/recurrent labs above or change to:   Same     Immunization History   Administered Date(s) Administered   • COVID-19 (MODERNA) 1st, 2nd, 3rd Dose Only 03/18/2021, 04/15/2021   • COVID-19 (MODERNA) BOOSTER 12/24/2021   • Fluad Quad 65+ 09/30/2020, 10/15/2021   • Fluzone High Dose =>65 Years ([employer] ONLY) 10/01/2018   • Pneumococcal Conjugate 13-Valent (PCV13) 10/15/2021   • Td 04/14/2008   • Tdap 09/30/2020     We advised/reaffirmed our support/suggestion for staying complete with covid- covid boosters, seasonal flu/yearly and any missing vaccine from list we supplied; we suggest contact with local health department office to review missing/needed vaccines and then bring nursing documentation for these vaccines to this office or call this information in. Shingles became \"free\" 1.1.23.     Health maintenance:   Body mass index is 29.87 kg/m².  BMI is >= 25 and <30. (Overweight) The following options were offered after discussion;: none (medical contraindication)      Tobacco use reviewed:   Erwin ISABEL Shah Jr.  reports that he has never smoked. He has never used smokeless tobacco..   There are no Patient Instructions on file for this visit.    Visit today involved chronic significant medical problems or differentials and/or intensive drug monitoring: ie potential to cause serious morbidity or death:     Follow up: Return for 1m-this heart test/stress test is to be next 48 hrs or let Dr Arenas know.  Future Appointments   Date Time Provider Department Center   4/24/2023  2:45 PM Isidro Carson MD [facility]   5/15/2023  9:00 AM Isidro Carson MD [facility]             "

## 2023-03-20 NOTE — DISCHARGE INSTRUCTIONS
Today you are seen for your symptoms.  Your lab work and chest x-ray are reassuring.  As we discussed with shared decision making we cannot rule out unstable angina without doing a stress test and you elected to follow-up for outpatient assessment her primary care provider which I think is reasonable so long as you return the emergency department immediately if you experience new or worsening chest pain.  I think your sensation you are feeling of your heart skipping beat is due to what are called premature ventricular contractions.  As we discussed in general these are not a dangerous arrhythmia but years are fairly frequent so I definitely want you to follow-up with our cardiology group.  I have attached their number please also call to schedule close follow-up appointment within 1 week.  I know you are already planning to go see your family member who is your primary care doctor so please ensure that you go within the next 2 days.  If any of your symptoms worsen prior please immediate return to the emergency department.

## 2023-03-24 ENCOUNTER — HOSPITAL ENCOUNTER (OUTPATIENT)
Dept: CARDIOLOGY | Facility: HOSPITAL | Age: 73
Discharge: HOME | End: 2023-03-24
Payer: MEDICARE

## 2023-03-24 VITALS
SYSTOLIC BLOOD PRESSURE: 152 MMHG | HEIGHT: 69 IN | WEIGHT: 200 LBS | HEART RATE: 71 BPM | BODY MASS INDEX: 29.62 KG/M2 | DIASTOLIC BLOOD PRESSURE: 81 MMHG

## 2023-03-24 DIAGNOSIS — I49.9 CARDIAC ARRHYTHMIA, UNSPECIFIED CARDIAC ARRHYTHMIA TYPE: ICD-10-CM

## 2023-03-24 DIAGNOSIS — R03.0 ELEVATED BLOOD PRESSURE READING: ICD-10-CM

## 2023-03-24 DIAGNOSIS — R07.9 CHEST PAIN, UNSPECIFIED TYPE: ICD-10-CM

## 2023-03-24 LAB
MAXIMAL PREDICTED HEART RATE: 150 BPM
STRESS TARGET HR: 128 BPM

## 2023-03-24 PROCEDURE — 93350 STRESS TTE ONLY: CPT | Performed by: INTERNAL MEDICINE

## 2023-03-24 PROCEDURE — 93018 CV STRESS TEST I&R ONLY: CPT | Performed by: INTERNAL MEDICINE

## 2023-03-24 PROCEDURE — 93352 ADMIN ECG CONTRAST AGENT: CPT | Performed by: INTERNAL MEDICINE

## 2023-03-24 PROCEDURE — 25510000001 PERFLUTREN 6.52 MG/ML SUSPENSION: Performed by: INTERNAL MEDICINE

## 2023-03-24 PROCEDURE — 93242 EXT ECG>48HR<7D RECORDING: CPT

## 2023-03-24 PROCEDURE — 93350 STRESS TTE ONLY: CPT

## 2023-03-24 PROCEDURE — 93017 CV STRESS TEST TRACING ONLY: CPT

## 2023-03-24 RX ADMIN — PERFLUTREN 8.48 MG: 6.52 INJECTION, SUSPENSION INTRAVENOUS at 11:08

## 2023-03-24 RX ADMIN — PERFLUTREN 8.48 MG: 6.52 INJECTION, SUSPENSION INTRAVENOUS at 10:27

## 2023-03-25 LAB
BH CV STRESS BP STAGE 1: NORMAL
BH CV STRESS BP STAGE 2: NORMAL
BH CV STRESS DURATION MIN STAGE 1: 3
BH CV STRESS DURATION MIN STAGE 2: 3
BH CV STRESS DURATION SEC STAGE 1: 0
BH CV STRESS DURATION SEC STAGE 2: 0
BH CV STRESS ECHO POST STRESS EJECTION FRACTION EF: 50 %
BH CV STRESS GRADE STAGE 1: 10
BH CV STRESS GRADE STAGE 2: 12
BH CV STRESS HR STAGE 1: 121
BH CV STRESS HR STAGE 2: 130
BH CV STRESS METS STAGE 1: 5
BH CV STRESS METS STAGE 2: 7.5
BH CV STRESS PROTOCOL 1: NORMAL
BH CV STRESS RECOVERY BP: NORMAL MMHG
BH CV STRESS RECOVERY HR: 96 BPM
BH CV STRESS SPEED STAGE 1: 1.7
BH CV STRESS SPEED STAGE 2: 2.5
BH CV STRESS STAGE 1: 1
BH CV STRESS STAGE 2: 2
MAXIMAL PREDICTED HEART RATE: 150 BPM
PERCENT MAX PREDICTED HR: 86.67 %
STRESS BASELINE BP: NORMAL MMHG
STRESS BASELINE HR: 70 BPM
STRESS PERCENT HR: 102 %
STRESS POST ESTIMATED WORKLOAD: 7.5 METS
STRESS POST EXERCISE DUR MIN: 6 MIN
STRESS POST EXERCISE DUR SEC: 0 SEC
STRESS POST PEAK BP: NORMAL MMHG
STRESS POST PEAK HR: 130 BPM
STRESS TARGET HR: 128 BPM

## 2023-03-26 DIAGNOSIS — R94.39 ABNORMAL STRESS TEST: Primary | ICD-10-CM

## 2023-03-28 ENCOUNTER — OFFICE VISIT (OUTPATIENT)
Dept: CARDIOLOGY | Facility: CLINIC | Age: 73
End: 2023-03-28
Payer: MEDICARE

## 2023-03-28 VITALS
WEIGHT: 200 LBS | HEIGHT: 69 IN | SYSTOLIC BLOOD PRESSURE: 148 MMHG | DIASTOLIC BLOOD PRESSURE: 71 MMHG | BODY MASS INDEX: 29.62 KG/M2 | HEART RATE: 84 BPM

## 2023-03-28 DIAGNOSIS — E78.2 MIXED HYPERLIPIDEMIA: Chronic | ICD-10-CM

## 2023-03-28 DIAGNOSIS — I10 PRIMARY HYPERTENSION: Primary | Chronic | ICD-10-CM

## 2023-03-28 DIAGNOSIS — R07.89 CHEST DISCOMFORT: ICD-10-CM

## 2023-03-28 DIAGNOSIS — I49.9 CARDIAC ARRHYTHMIA, UNSPECIFIED CARDIAC ARRHYTHMIA TYPE: ICD-10-CM

## 2023-03-28 DIAGNOSIS — I51.89 DIASTOLIC DYSFUNCTION: ICD-10-CM

## 2023-03-28 DIAGNOSIS — Z78.9 NON-SMOKER: ICD-10-CM

## 2023-03-28 DIAGNOSIS — R00.2 PALPITATIONS: ICD-10-CM

## 2023-03-28 DIAGNOSIS — R94.39 ABNORMAL STRESS ECHO: ICD-10-CM

## 2023-03-28 PROCEDURE — 99204 OFFICE O/P NEW MOD 45 MIN: CPT | Performed by: INTERNAL MEDICINE

## 2023-03-28 PROCEDURE — 1159F MED LIST DOCD IN RCRD: CPT | Performed by: INTERNAL MEDICINE

## 2023-03-28 PROCEDURE — 1160F RVW MEDS BY RX/DR IN RCRD: CPT | Performed by: INTERNAL MEDICINE

## 2023-03-28 PROCEDURE — 3077F SYST BP >= 140 MM HG: CPT | Performed by: INTERNAL MEDICINE

## 2023-03-28 PROCEDURE — 3078F DIAST BP <80 MM HG: CPT | Performed by: INTERNAL MEDICINE

## 2023-03-28 RX ORDER — ATENOLOL 25 MG/1
25 TABLET ORAL DAILY
Qty: 30 TABLET | Refills: 11 | Status: SHIPPED | OUTPATIENT
Start: 2023-03-28

## 2023-03-28 RX ORDER — NITROGLYCERIN 0.4 MG/1
TABLET SUBLINGUAL
Qty: 25 TABLET | Refills: 3 | Status: SHIPPED | OUTPATIENT
Start: 2023-03-28

## 2023-03-28 NOTE — PROGRESS NOTES
.  Erwin Shah Jr.  [id number]  1950  72 y.o.  male    Referring Provider: Isidro Carson MD    Reason for  Visit:  Initial visit       Subjective     Chest discomfort  both with exertion as well as at rest.  Feels episodes of chest pain occur more often with exertion  Moderate substernal,   Pressure like   Chest pain non pleuritic  Chest pain non positional and non gustatory   Lasts less than 5 minutes    Started more than 3 months ago  Occurs once or twice a week on the average but can be variable in frequency  No associated diaphoresis    No associated nausea  No radiation    Relieved with rest or spontaneously  Not positional    No change with intake of food or antacids  No change with breathing  Mild to moderate associated dyspnea    No similar chest pain episodes in the past     Intermittent palpitations, once every several days to several weeks lasting for less than 1 minute  No associated symptoms of dizziness, weakness, chest pain,  shortness of breath      Easy fatiguability and increasing tired  Feels energy levels running low      BP elevated at home   Reviewed home BP recordings   Recent ER visit    History of present illness:  Erwin Shah Jr. is a 72 y.o. yo male with essential hypertension who presents today for   Chief Complaint   Patient presents with   • Establish Care      ABNORMAL STRESS PATIENT DOES HAVE THE HOLTER MONITOR ON AT THIS TIME. HE ISN'T HAVING CHEST PAIN AT THIS TIME HE WAS HAVING PALPATIONS AND HE DOES HAVE SOME TINGLING TO THE LEFT PALM    .    History  Past Medical History:   Diagnosis Date   • Allergic    • Arthritis    • Asthma    • GERD (gastroesophageal reflux disease)    • HL (hearing loss)    • Hyperlipidemia    • Hypertension    ,   Past Surgical History:   Procedure Laterality Date   • CHOLECYSTECTOMY     • COLONOSCOPY N/A 7/31/2017    Procedure: COLONOSCOPY;  Surgeon: Ajith Perez MD;  Location: [facility] ENDOSCOPY;  Service:    • COLONOSCOPY N/A 8/26/2022     Procedure: COLONOSCOPY WITH ANESTHESIA;  Surgeon: Ajith Perez MD;  Location: [facility] ENDOSCOPY;  Service: Gastroenterology;  Laterality: N/A;  pre hx polyp  post normal  Isidro Carson MD   • ENDOSCOPY  05/07/2010   ,   Family History   Problem Relation Age of Onset   • No Known Problems Mother    • Liver disease Father    • Colon cancer Neg Hx    ,   Social History     Tobacco Use   • Smoking status: Never   • Smokeless tobacco: Never   Substance Use Topics   • Alcohol use: Yes     Alcohol/week: 5.0 standard drinks     Types: 2 Cans of beer, 3 Shots of liquor per week     Comment: occasional   • Drug use: No   ,     Medications  Current Outpatient Medications   Medication Sig Dispense Refill   • aspirin  MG tablet Take 1 tablet by mouth Daily.     • atorvastatin (Lipitor) 20 MG tablet Take 1 tablet by mouth Daily. 90 tablet 3   • enalapril (VASOTEC) 20 MG tablet TAKE 1 TABLET BY MOUTH DAILY. MUST KEEP APPT 90 tablet 2   • hydroCHLOROthiazide (HYDRODIURIL) 12.5 MG tablet Take 1 tablet by mouth Daily. 90 tablet 3   • Loratadine 10 MG capsule Take 1 capsule by mouth Daily.     • meclizine (ANTIVERT) 25 MG tablet TAKE 1 TABLET BY MOUTH 3 (THREE) TIMES A DAY AS NEEDED FOR DIZZINESS. (Patient taking differently: Take 1 tablet by mouth As Needed for Dizziness (rare).) 90 tablet 1   • Omega-3 Fatty Acids (fish oil) 1000 MG capsule capsule Take 1 capsule by mouth Daily With Breakfast.     • atenolol (TENORMIN) 25 MG tablet Take 1 tablet by mouth Daily. 30 tablet 11   • nitroglycerin (NITROSTAT) 0.4 MG SL tablet 1 under the tongue as needed for angina, may repeat q5mins for up three doses 25 tablet 3     No current facility-administered medications for this visit.       Allergies:  Patient has no known allergies.    Review of Systems  Review of Systems   Constitutional: Negative.   HENT: Negative.    Eyes: Negative.    Cardiovascular: Positive for chest pain, dyspnea on exertion and palpitations.  "Negative for claudication, cyanosis, irregular heartbeat, leg swelling, near-syncope, orthopnea, paroxysmal nocturnal dyspnea and syncope.   Respiratory: Negative.    Endocrine: Negative.    Hematologic/Lymphatic: Negative.    Skin: Negative.    Musculoskeletal: Positive for arthritis, back pain and joint pain.   Gastrointestinal: Negative for anorexia.   Genitourinary: Negative.    Neurological: Negative.    Psychiatric/Behavioral: Negative.        Objective     Physical Exam:  /71   Pulse 84   Ht 175.3 cm (69\")   Wt 90.7 kg (200 lb)   BMI 29.53 kg/m²     Physical Exam  Constitutional:       Appearance: He is well-developed.   HENT:      Head: Normocephalic.   Neck:      Vascular: Normal carotid pulses. No carotid bruit or JVD.      Trachea: No tracheal tenderness or tracheal deviation.   Cardiovascular:      Rate and Rhythm: Regular rhythm.      Pulses: Normal pulses.      Heart sounds: Normal heart sounds.   Pulmonary:      Effort: Pulmonary effort is normal.      Breath sounds: No stridor.   Abdominal:      General: There is no distension.      Palpations: Abdomen is soft.      Tenderness: There is no abdominal tenderness.   Musculoskeletal:      Cervical back: No edema.   Skin:     General: Skin is warm.   Neurological:      Mental Status: He is alert.      Cranial Nerves: No cranial nerve deficit.      Sensory: No sensory deficit.   Psychiatric:         Speech: Speech normal.         Behavior: Behavior normal.         Results Review:    Results for orders placed during the hospital encounter of 03/24/23    Adult Stress Echo W/ Cont or Stress Agent if Necessary Per Protocol    Interpretation Summary  •  Left ventricular ejection fraction appears to be 46 - 50%.  •  The following left ventricular wall segments are hypokinetic: apical septal and apex hypokinetic.  •  The following left ventricular wall segments are akinetic: apical septal and apex.  •  The patient reported shortness of breath during the " "stress test. The patient experienced no angina during the stress test.  •  Abnormal stress echo with echocardiographic evidence for myocardial ischemia located in the inferior wall and apical wall.     Family Cardiac History as of 2021    No family history on file.     [facility] PACS Images     Show images for Adult Stress Echo W/ Cont or Stress Agent if Necessary Per Protocol    Interpretation Summary    • Agosto treadmill score of 9  • No ischemic changes during stress  • Frequent PVCs noted throughout the exam  • No clinical symptoms of ischemia during stress  • Excellent functional capacity at 10 metabolic equivalents  • Appropriate blood pressure and heart rate response to stress  • Segment augmentation had a normal response to stress. Cavity size behaved normally in response to stress.  • Normal stress echo consistent with a low risk study for myocardial ischemia.  • Overall this is a low risk test for ischemia. Frequent PVCs noted throughout the entire exam.     Erwin Shah Jr.  Complete Transthoracic Echocardiogram with Complete Doppler and Color Flow  Order# [id number]  Reading physician: Ronnie Pfeiffer MD Ordering physician: Isidro Carson MD Study date: 21     Patient Information    Patient Name   Erwin Shah Jr. MRN   [id number] Legal Sex   Male  (Age)   1950 (72 y.o.)     Patient Hx Of Height, Weight, and Vitals    Height Weight BSA (Calculated - sq m) BMI (Calculated) Retired BMI (kg/m2) Pulse BP   175.3 cm (69.02\") 86.6 kg (191 lb) 2.03 sq meters 28.2 28.25  150/84     [facility] PACS Images     Show images for Adult Transthoracic Echo Complete W/ Cont if Necessary Per Protocol      Clinical Indication    Palpitations; Arrhythmia   Dx: Essential hypertension [I10 (ICD-10-CM)]; Palpitations [R00.2 (ICD-10-CM)]; Elevated blood pressure reading [R03.0 (ICD-10-CM)]; Cardiac arrhythmia, unspecified cardiac arrhythmia type [I49.9 (ICD-10-CM)]; Dyspnea, unspecified type [R06.00 " (ICD-10-CM)]     Interpretation Summary    • Left ventricular ejection fraction appears to be 56 - 60%. Left ventricular systolic function is normal.  • Left ventricular diastolic dysfunction is noted.  • Normal right ventricular cavity size and systolic function noted.  • There is no significant (greater than mild) valvular dysfunction.  • Estimated right ventricular systolic pressure from tricuspid regurgitation is mildly elevated (35-45 mmHg).              ____________________________________________________________________________________________________________________________________________  Health maintenance and recommendations    Low salt/ HTN/ Heart healthy carbohydrate restricted cardiac diet   The patient is advised to reduce or avoid caffeine or other cardiac stimulants.   Minimize or avoid  NSAID-type medications      Monitor for any signs of bleeding including red or dark stools. Fall precautions.   Advised staying uptodate with immunizations per established standard guidelines.    Offered to give patient  a copy of my notes     Questions were encouraged, asked and answered to the patient's  understanding and satisfaction. Questions if any regarding current medications and side effects, need for refills and importance of compliance to medications stressed.    Reviewed available prior notes, consults, prior visits, laboratory findings, radiology and cardiology relevant reports. Updated chart as applicable. I have reviewed the patient's medical history in detail and updated the computerized patient record as relevant.      Updated patient regarding any new or relevant abnormalities on review of records or any new findings on physical exam. Mentioned to patient about purpose of visit and desirable health short and long term goals and objectives.    Primary to monitor CBC CMP Lipid panel and TSH as  applicable    ___________________________________________________________________________________________________________________________________________   Procedures    Assessment & Plan   Diagnoses and all orders for this visit:    1. Primary hypertension (Primary)    2. Cardiac arrhythmia, unspecified cardiac arrhythmia type    3. Mixed hyperlipidemia    4. Palpitations    5. Chest discomfort  -     Adult Transthoracic Echo Complete w/ Color, Spectral and Contrast if necessary per protocol; Future    6. Diastolic dysfunction    7. Non-smoker    8. Abnormal stress echo    Other orders  -     atenolol (TENORMIN) 25 MG tablet; Take 1 tablet by mouth Daily.  Dispense: 30 tablet; Refill: 11  -     nitroglycerin (NITROSTAT) 0.4 MG SL tablet; 1 under the tongue as needed for angina, may repeat q5mins for up three doses  Dispense: 25 tablet; Refill: 3          Plan    Patient expressed understanding  Encouraged and answered all questions   Discussed with the patient and all questioned fully answered. He will call me if any problems arise.   Discussed results of prior testing with patient : prior echo and stress echoes  as well electrocardiogram in chart     Recommend cardiac catheterization, selective coronary angiography, left ventriculography and percutaneous coronary intervention with application of arteriotomy hemostatic closure device.  I discussed cardiac catheterization, the procedure, risks (including bleeding, infection, vascular damage [including minor oozing, bruising, bleeding, and up to and including but not limited to the need for vascular surgery, emergency cardiothoracic surgery, contrast reaction, renal failure, respiratory failure, heart attack, stroke, arrhythmia and even death), benefits, and alternatives and the patient has voiced understanding and is willing to proceed.  Adequate pre-hydration and post cardiac catheterization hydration.  Premedications as required and indicated for cardiac  catheterization.  No contraindication to drug eluting stent placement if required  Further recommendations pending results of cardiac catheterization     Orders Placed This Encounter   Procedures   • Adult Transthoracic Echo Complete w/ Color, Spectral and Contrast if necessary per protocol     Standing Status:   Future     Standing Expiration Date:   3/28/2024     Scheduling Instructions:      Myocardial strain to be performed during echocardiogram as long as technically feasible     Order Specific Question:   Reason for exam?     Answer:   Dyspnea     Order Specific Question:   Release to patient     Answer:   Routine Release      I support the patient's decision to take the Covid -19 vaccine   Had required complete course   No major issues   Now fully immunized    Had booster too     Went to ER   Reviewed records from the ER    Reviewed and summarized recent test results      Check results of outpatient cardiac telemetry that he is wearing when available   S/L NTG PRN for chest pain, call me or go to ER if has to use S/L nitroglycerin     Needs better BP control    Requested Prescriptions     Signed Prescriptions Disp Refills   • atenolol (TENORMIN) 25 MG tablet 30 tablet 11     Sig: Take 1 tablet by mouth Daily.   • nitroglycerin (NITROSTAT) 0.4 MG SL tablet 25 tablet 3     Si under the tongue as needed for angina, may repeat q5mins for up three doses      Check BP and heart rates twice daily initially till blood pressures and heart rates under good control and then at least 3x / week,   If blood pressures continue to be well-controlled then can check week a month  at home and bring a recording for review next visit  If BP >130/85 or < 100/60 persistently over 3 reading 30 mins apart or if heart rates persistently above 100 bpm or less than 55 bpm call sooner for evaluation and advise              Return in about 6 weeks (around 2023).

## 2023-04-20 LAB
MAXIMAL PREDICTED HEART RATE: 150 BPM
STRESS TARGET HR: 128 BPM

## 2023-04-21 ENCOUNTER — TELEPHONE (OUTPATIENT)
Dept: CARDIOLOGY | Facility: CLINIC | Age: 73
End: 2023-04-21

## 2023-04-21 NOTE — TELEPHONE ENCOUNTER
Caller: Erwin Shah Jr.    Relationship to patient: Self    Best call back number: [phone]    Chief complaint: NEEDING AN APPT WITH DR. HARTMANN AFTER HIS ECHO 4.28.23    Type of visit: FOLLOW UP    Requested date: AT LEAST 2 WEEKS AFTER ECHO BUT PT DOES NOT WANT TO WAIT PAST June 9TH    If rescheduling, when is the original appointment: 5.16.23    Additional notes: PT NEEDS Monday MORNING OR Friday AFTER NOON

## 2023-04-28 ENCOUNTER — HOSPITAL ENCOUNTER (OUTPATIENT)
Dept: CARDIOLOGY | Facility: HOSPITAL | Age: 73
Discharge: HOME | End: 2023-04-28
Payer: MEDICARE

## 2023-04-28 VITALS
WEIGHT: 200 LBS | SYSTOLIC BLOOD PRESSURE: 115 MMHG | DIASTOLIC BLOOD PRESSURE: 59 MMHG | BODY MASS INDEX: 29.62 KG/M2 | HEIGHT: 69 IN

## 2023-04-28 DIAGNOSIS — R07.89 CHEST DISCOMFORT: ICD-10-CM

## 2023-04-28 PROCEDURE — 93306 TTE W/DOPPLER COMPLETE: CPT | Performed by: INTERNAL MEDICINE

## 2023-04-28 PROCEDURE — 93306 TTE W/DOPPLER COMPLETE: CPT

## 2023-04-28 PROCEDURE — 93356 MYOCRD STRAIN IMG SPCKL TRCK: CPT | Performed by: INTERNAL MEDICINE

## 2023-04-28 PROCEDURE — 93356 MYOCRD STRAIN IMG SPCKL TRCK: CPT

## 2023-04-28 PROCEDURE — 25510000001 PERFLUTREN 6.52 MG/ML SUSPENSION: Performed by: INTERNAL MEDICINE

## 2023-04-28 RX ADMIN — PERFLUTREN 9.78 MG: 6.52 INJECTION, SUSPENSION INTRAVENOUS at 10:20

## 2023-04-30 LAB
BH CV ECHO LEFT VENTRICLE GLOBAL LONGITUDINAL STRAIN: -21.6 %
BH CV ECHO MEAS - AO MAX PG: 8 MMHG
BH CV ECHO MEAS - AO MEAN PG: 4 MMHG
BH CV ECHO MEAS - AO ROOT DIAM: 2.8 CM
BH CV ECHO MEAS - AO V2 MAX: 141 CM/SEC
BH CV ECHO MEAS - AO V2 VTI: 32.6 CM
BH CV ECHO MEAS - AVA(I,D): 3 CM2
BH CV ECHO MEAS - EDV(CUBED): 132.7 ML
BH CV ECHO MEAS - EDV(MOD-SP4): 135 ML
BH CV ECHO MEAS - EF(MOD-SP4): 60.1 %
BH CV ECHO MEAS - ESV(CUBED): 50.7 ML
BH CV ECHO MEAS - ESV(MOD-SP4): 53.9 ML
BH CV ECHO MEAS - FS: 27.5 %
BH CV ECHO MEAS - IVS/LVPW: 0.89 CM
BH CV ECHO MEAS - IVSD: 0.8 CM
BH CV ECHO MEAS - LA DIMENSION: 3.9 CM
BH CV ECHO MEAS - LAT PEAK E' VEL: 9.1 CM/SEC
BH CV ECHO MEAS - LV DIASTOLIC VOL/BSA (35-75): 65.4 CM2
BH CV ECHO MEAS - LV MASS(C)D: 151.8 GRAMS
BH CV ECHO MEAS - LV MAX PG: 5 MMHG
BH CV ECHO MEAS - LV MEAN PG: 2 MMHG
BH CV ECHO MEAS - LV SYSTOLIC VOL/BSA (12-30): 26.1 CM2
BH CV ECHO MEAS - LV V1 MAX: 112 CM/SEC
BH CV ECHO MEAS - LV V1 VTI: 25.6 CM
BH CV ECHO MEAS - LVIDD: 5.1 CM
BH CV ECHO MEAS - LVIDS: 3.7 CM
BH CV ECHO MEAS - LVOT AREA: 3.8 CM2
BH CV ECHO MEAS - LVOT DIAM: 2.2 CM
BH CV ECHO MEAS - LVPWD: 0.9 CM
BH CV ECHO MEAS - MED PEAK E' VEL: 7 CM/SEC
BH CV ECHO MEAS - MV A MAX VEL: 96.3 CM/SEC
BH CV ECHO MEAS - MV DEC TIME: 0.23 MSEC
BH CV ECHO MEAS - MV E MAX VEL: 97.9 CM/SEC
BH CV ECHO MEAS - MV E/A: 1.02
BH CV ECHO MEAS - PI END-D VEL: 144 CM/SEC
BH CV ECHO MEAS - RAP SYSTOLE: 5 MMHG
BH CV ECHO MEAS - RVSP: 31.6 MMHG
BH CV ECHO MEAS - SI(MOD-SP4): 39.3 ML/M2
BH CV ECHO MEAS - SV(LVOT): 97.3 ML
BH CV ECHO MEAS - SV(MOD-SP4): 81.1 ML
BH CV ECHO MEAS - TR MAX PG: 26.6 MMHG
BH CV ECHO MEAS - TR MAX VEL: 258 CM/SEC
BH CV ECHO MEASUREMENTS AVERAGE E/E' RATIO: 12.16
LEFT ATRIUM VOLUME INDEX: 30.4 ML/M2
LEFT ATRIUM VOLUME: 63 ML
MAXIMAL PREDICTED HEART RATE: 150 BPM
STRESS TARGET HR: 128 BPM

## 2023-05-15 ENCOUNTER — OFFICE VISIT (OUTPATIENT)
Dept: FAMILY MEDICINE CLINIC | Facility: CLINIC | Age: 73
End: 2023-05-15
Payer: MEDICARE

## 2023-05-15 VITALS
HEART RATE: 47 BPM | HEIGHT: 69 IN | DIASTOLIC BLOOD PRESSURE: 72 MMHG | SYSTOLIC BLOOD PRESSURE: 122 MMHG | RESPIRATION RATE: 14 BRPM | BODY MASS INDEX: 29.33 KG/M2 | WEIGHT: 198 LBS | OXYGEN SATURATION: 98 %

## 2023-05-15 DIAGNOSIS — I49.9 CARDIAC ARRHYTHMIA, UNSPECIFIED CARDIAC ARRHYTHMIA TYPE: ICD-10-CM

## 2023-05-15 DIAGNOSIS — I51.89 DIASTOLIC DYSFUNCTION: ICD-10-CM

## 2023-05-15 DIAGNOSIS — J30.9 ALLERGIC RHINITIS, UNSPECIFIED SEASONALITY, UNSPECIFIED TRIGGER: ICD-10-CM

## 2023-05-15 DIAGNOSIS — M15.0 PRIMARY GENERALIZED (OSTEO)ARTHRITIS: ICD-10-CM

## 2023-05-15 DIAGNOSIS — R42 VERTIGO: ICD-10-CM

## 2023-05-15 DIAGNOSIS — R94.39 ABNORMAL STRESS ECHO: ICD-10-CM

## 2023-05-15 DIAGNOSIS — Z79.01 ANTICOAGULATED: ICD-10-CM

## 2023-05-15 DIAGNOSIS — I10 PRIMARY HYPERTENSION: Chronic | ICD-10-CM

## 2023-05-15 DIAGNOSIS — K21.9 GASTROESOPHAGEAL REFLUX DISEASE, UNSPECIFIED WHETHER ESOPHAGITIS PRESENT: Chronic | ICD-10-CM

## 2023-05-15 RX ORDER — MECLIZINE HYDROCHLORIDE 25 MG/1
25 TABLET ORAL AS NEEDED
Start: 2023-05-15

## 2023-05-15 NOTE — PROGRESS NOTES
Subjective   Erwin Shah Jr. is a 72 y.o. male presenting with chief complaint of:   Chief Complaint   Patient presents with   • Follow-up     Diastolic dysfunction   Answers for HPI/ROS submitted by the patient on 5/12/2023  What is the primary reason for your visit?: Physical    AWV 11.7.22  Last Completed Annual Wellness Visit          ANNUAL WELLNESS VISIT (Yearly) Next due on 11/7/2023 11/07/2022  Level of Service: PPPS, SUBSEQ VISIT    11/01/2021  Done    11/01/2021  Level of Service: PPPS, SUBSEQ VISIT    09/30/2020  Level of Service: ME PPPS, SUBSEQ VISIT    09/30/2020  Done    Only the first 5 history entries have been loaded, but more history exists.                 History of Present Illness :  Alone.   Here for review of chronic problems that includes HTN and others.  A    Has multiple chronic problems to consider that might have a bearing on today's issues;  an interval appointment.       Chronic/acute problems reviewed today:   1. Primary hypertension Chronic/stable. Stable here past/and home blood pressures.  No significant chest pain, SOB, LE edema, orthopnea, near syncope, dizziness/light headness.   Recent Vitals       3/28/2023 4/28/2023 5/15/2023       BP: 148/71 115/59 122/72     Pulse: 84 -- 47     Weight: 90.7 kg (200 lb) 90.7 kg (200 lb) 89.8 kg (198 lb)     BMI (Calculated): 29.5 29.5 29.2            2. Allergic rhinitis, unspecified seasonality, unspecified trigger Chronic/variable.   On/off eye, sinus, nasal congestion and drainage.  Rx stopped from OTC sinus.  Aware of options additional medications, testing and not interested.     3. Cardiac arrhythmia, unspecified cardiac arrhythmia type chronic history of PVCs and a completely symptomatically gone away with beta-blocker added after his last stress test and mild chest discomforts   4. Diastolic dysfunction previous diastolic changes seen on echo but last echo did not show it.Shortness of breath of significance and current level of  activity; he has reduced some of his exercise   5. Gastroesophageal reflux disease, unspecified whether esophagitis present Chronic/stable.  Controlled heartburn, reflux without dysphagia, melena.  Rx used, periods not used proven currently needed with symptoms -currently doing ok.      6. Anticoagulated prevention+DJD+angina/, ()ASA 81x2) Chronic/stable reason for stopping or use of.  Denies bleeding issues; especially epistaxis, melena, hematochezia.  Upper arms/others do not significantly bruise easily.  No significant bleeding or falls.      7. Primary generalized (osteo)arthritis Chronic/stable.  Various on/off joint pains/soreness/stiffness.  Particular joint problems with various.  No joint swelling.  Treats mainly with reduced activity, Rx listed, Tylenol.  No  NSAIDs, and no injections.      8. Vertigo-? allergy related rare occasional lightheadedness without palpitations chest pain syncope near syncope   9 Abnormal EST/echo; no chest pain , SOB     Has an/another acute issue today: none.    The following portions of the patient's history were reviewed and updated as appropriate: allergies, current medications, past family history, past medical history, past social history, past surgical history and problem list.      Current Outpatient Medications:   •  aspirin  MG tablet, Take 1 tablet by mouth Daily., Disp: , Rfl:   •  atenolol (TENORMIN) 25 MG tablet, Take 1 tablet by mouth Daily., Disp: 30 tablet, Rfl: 11  •  atorvastatin (Lipitor) 20 MG tablet, Take 1 tablet by mouth Daily., Disp: 90 tablet, Rfl: 3  •  enalapril (VASOTEC) 20 MG tablet, TAKE 1 TABLET BY MOUTH DAILY. MUST KEEP APPT, Disp: 90 tablet, Rfl: 2  •  hydroCHLOROthiazide (HYDRODIURIL) 12.5 MG tablet, Take 1 tablet by mouth Daily., Disp: 90 tablet, Rfl: 3  •  Loratadine 10 MG capsule, Take 1 capsule by mouth Daily., Disp: , Rfl:   •  meclizine (ANTIVERT) 25 MG tablet, TAKE 1 TABLET BY MOUTH 3 (THREE) TIMES A DAY AS NEEDED FOR  DIZZINESS. (Patient taking differently: Take 1 tablet by mouth As Needed for Dizziness (rare).), Disp: 90 tablet, Rfl: 1  •  nitroglycerin (NITROSTAT) 0.4 MG SL tablet, 1 under the tongue as needed for angina, may repeat q5mins for up three doses, Disp: 25 tablet, Rfl: 3  •  Omega-3 Fatty Acids (fish oil) 1000 MG capsule capsule, Take 1 capsule by mouth Daily With Breakfast., Disp: , Rfl:     No problems with medications.    No Known Allergies    Review of Systems  GENERAL:  Active/slower with limits, speed, stamina for age; minimal influence. Sleep is ok; apnea denied. No fever now.  SKIN: No rash/skin lesion of concern unless mentioned above/below.   ENDO:  No syncope, near or diaphoretic sweaty spells.  HEENT: No recent head injury; or headache.  No vision change.   No significant hearing loss.  Ears without pain/drainage.  No sore throat.  Occ on/off nasal/sinus congestion/drainage. No epistaxis.  CHEST: No chest wall tenderness or mass. No cough, without wheeze.  No SOB; no hemoptysis.  CV: No exertional/resting chest pain, palpitations, ankle edema.  GI: No heartburn, dysphagia.  No usual abdominal pain, diarrhea, constipation.  No rectal bleeding, or melena.    :  Voids without dysuria, or incontinence to completion.    ORTHO: No painful/swollen joints but various on /off sore.  No change occ sore neck or back.  No acute neck or back pain without recent injury.  NEURO: No dizziness, weakness of extremities.  No numbness/paresthesias.   PSYCH: No memory loss.  Mood good; not that anxious, depressed but/and not suicidal.  Tries to tolerate stress .   Screening:  Mammogram: NA  Bone density: NA  Low dose CT chest: non-smoker/NA  GI:   EGD+biop/[facility]/Shiben/5.7.10/3y  Colon-polyp/BH/Shieben/7.31.17/5y  Colon-nl/Shieben/BH/8.26.22/5y  Prostate: 11.7.22 -voiding ok  11.1.21 AUA same  9.30.20 AUA 4  3.27.19 AUA 2/1  Usual lab order  12m CBC, CMP, LIPID, TSH, PSAs    Copy/paste function used for ROS/exam AND each area  of these were reviewed, updated, confirmed and supplemented as needed.  Data reviewed:   Recent admit/ER/MD visits:      [facility] visit 8.28.23     3.20.23     1. Diastolic dysfunction    2. Cardiac arrhythmia, unspecified cardiac arrhythmia type    3. Mixed hyperlipidemia    4. Elevated blood pressure reading    5. Chest pain, unspecified type          Data review above:   Discussions/medical decisions/reviews:  BP too high  Other vitals ok  DM/ 3.19.23  Lipid  11.7.22; lipitor 20  PSA ok 11.7.22  CBC ok 3.19.23  Renal ok 3.19.23  Liver ok 3.19.23  Vit D sometime  Thyroid TSH, fT4 ok 11.7.22     Screening reviewed/updated noted  ER if worsens while  Zio 7 days  EST/echo  Monitor home bps  Add TSH, fT4      Last cardiac testing:   Echo:   Results for orders placed during the hospital encounter of 04/28/23    Adult Transthoracic Echo Complete w/ Color, Spectral and Contrast if necessary per protocol    Interpretation Summary  •  Left ventricular systolic function is normal. Left ventricular ejection fraction appears to be 56 - 60%.  •  Left ventricular diastolic function was normal.  •  Estimated right ventricular systolic pressure from tricuspid regurgitation is normal (<35 mmHg).  •  Normal global longitudinal LV strain (GLS) = -21.6%.    3.25.23  Left ventricular ejection fraction appears to be 46 - 50%.  •  The following left ventricular wall segments are hypokinetic: apical septal and apex hypokinetic.  •  The following left ventricular wall segments are akinetic: apical septal and apex.  •  The patient reported shortness of breath during the stress test. The patient experienced no angina during the stress test.  •  Abnormal stress echo with echocardiographic evidence for myocardial ischemia located in the inferior wall and apical wall.      Radiology considered:   XR Chest 1 View    Result Date: 3/20/2023  1. No active cardiopulmonary disease. This report was finalized on 03/20/2023 06:20 by   "Ella Nguyen MD.      Lab Results:  A1C:No results for input(s): HGBA1C in the last 76066 hours.  GLUCOSE:  Lab Results - Last 18 Months   Lab Units 03/19/23 2146 11/07/22 0900   GLUCOSE mg/dL 104* 94     LIPID:  Lab Results - Last 18 Months   Lab Units 11/07/22 0900   CHOLESTEROL mg/dL 189   LDL CHOL mg/dL 121*   HDL CHOL mg/dL 52   TRIGLYCERIDES mg/dL 89     PSA:  Lab Results   Component Value Date/Time    PSA 2.610 11/07/2022 09:00 AM    PSA 1.770 11/01/2021 09:40 AM    PSA 2.500 09/28/2020 07:47 AM    PSA 1.920 04/27/2018 07:39 AM    PSA 2.490 04/03/2017 07:17 AM       CBC:  Lab Results - Last 18 Months   Lab Units 03/19/23 2146 11/07/22 0900   WBC 10*3/mm3 11.20* 7.69   HEMOGLOBIN g/dL 13.9 14.5   HEMATOCRIT % 41.3 43.6   PLATELETS 10*3/mm3 254 247     BMP/CMP:  Lab Results - Last 18 Months   Lab Units 03/19/23 2146 11/07/22 0900   SODIUM mmol/L 139 142   POTASSIUM mmol/L 4.4 4.5   CHLORIDE mmol/L 104 106   TOTAL CO2 mmol/L  --  27.6   CO2 mmol/L 25.0  --    GLUCOSE mg/dL 104* 94   BUN mg/dL 13 13   CREATININE mg/dL 0.86 1.03   EGFR RESULT mL/min/1.73  --  78.6   CALCIUM mg/dL 9.1 9.3     HEPATIC:  Lab Results - Last 18 Months   Lab Units 03/19/23 2146 11/07/22 0900   ALT (SGPT) U/L 40 27   AST (SGOT) U/L 29 24   ALK PHOS U/L 98 93     HEPATITIS C ANTIBODY:   Lab Results   Component Value Date/Time    HEPCVIRUSABY <0.1 04/27/2018 07:39 AM     Vit D:No results for input(s): YAMX13RO in the last 24110 hours.  THYROID:  Lab Results - Last 18 Months   Lab Units 03/19/23 2146 11/07/22 0900   TSH uIU/mL 1.490 1.330   FREE T4 ng/dL 1.09  --          Objective   /72   Pulse (!) 47   Resp 14   Ht 175.3 cm (69\")   Wt 89.8 kg (198 lb)   SpO2 98%   BMI 29.24 kg/m²   Body mass index is 29.24 kg/m².    Recent Vitals       3/28/2023 4/28/2023 5/15/2023       BP: 148/71 115/59 122/72     Pulse: 84 -- 47     Weight: 90.7 kg (200 lb) 90.7 kg (200 lb) 89.8 kg (198 lb)     BMI (Calculated): 29.5 29.5 29.2 "         Wt Readings from Last 15 Encounters:   05/15/23 0914 89.8 kg (198 lb)   04/28/23 0949 90.7 kg (200 lb)   03/28/23 0810 90.7 kg (200 lb)   03/24/23 1011 90.7 kg (200 lb)   03/20/23 0933 91.8 kg (202 lb 4.8 oz)   03/19/23 2143 90.7 kg (200 lb)   11/07/22 0848 90 kg (198 lb 6.4 oz)   08/15/22 0832 89.8 kg (198 lb)   11/01/21 0955 89.4 kg (197 lb)   06/22/21 1546 86.6 kg (190 lb 14.7 oz)   06/17/21 1337 86.6 kg (191 lb)   06/15/21 1333 86.6 kg (191 lb)   09/30/20 1058 84.8 kg (187 lb)   03/27/19 0927 88 kg (194 lb)   04/30/18 1041 83.5 kg (184 lb)       Physical Exam  GENERAL:  Well nourished/developed in no acute distress.   SKIN: Turgor excellent, without wound, rash, lesion.   HEENT: Normal cephalic without trauma.  Pupils equal round reactive to light. Extraocular motions full without nystagmus.   External canals nonobstructive nontender without reddness. Tymphatic membranes roque with iveth structures intact.   Oral cavity without growths, exudates, and moist.  Posterior pharynx without mass, obstruction, redness.  No thyromegaly, mass, tenderness, lymphadenopathy and supple.  CV: Regular rhythm.  No murmur, gallop,  edema. Posterior pulses intact.  No carotid bruits.  CHEST: No chest wall tenderness or mass.   LUNGS: Symmetric motion with clear to auscultation.    ABD: Soft, nontender without mass.   ORTHO: Symmetric extremities without swelling/point tenderness.  Full gross range of motion.  NEURO: CN 2-12 grossly intact.  Symmetric facies and UE/LE. 4/5 strength throughout. 1/4 x bicep knee equal reflexe.  Nonfocal use extremities. Speech clear.  Intact light touch with monofilament, vibratory sensation with tuning fork; equal toes/distal feet.    PSYCH: Oriented x 3.  Pleasant calm, well kept.  Purposeful/directed conservation with intact short/long gross memory.       Assessment & Plan     1. Primary hypertension    2. Allergic rhinitis, unspecified seasonality, unspecified trigger    3. Cardiac  arrhythmia, unspecified cardiac arrhythmia type    4. Diastolic dysfunction    5. Gastroesophageal reflux disease, unspecified whether esophagitis present    6. Anticoagulated prevention+DJD+angina/, ()ASA 81x2)    7. Primary generalized (osteo)arthritis    8. Vertigo-? allergy related    9. Abnormal stress echo        Data review above:   Discussions/medical decisions/reviews:  BP ok  Other vitals ok  DM/BS 94 11.7.22  Lipid  11.7.22; now on lipitor  PSA ok 11.7.22  CBC WBC 11.2 3.19.23  Renal ok 3.19.23  Liver ok 3.19.23  Vit D sometime  Thyroid TSH, fT4 ok 3.19.23    Screening reviewed/updated   flonase advised   No decongestants  Waiting to see what [facility] eventually says about EST/echo abnormals; treat medically b-blocker/statin as doing successfully OR other    Data review above:   Rx: reviewed and decisions:   Rx new/changes: none  New Medications Ordered This Visit   Medications   • meclizine (ANTIVERT) 25 MG tablet     Sig: Take 1 tablet by mouth As Needed for Dizziness (rare).       Orders placed:   LAB/Testing/Referrals: reviewed/orders:   Today:   No orders of the defined types were placed in this encounter.    Chronic/recurrent labs above or change to:   Same     Immunization History   Administered Date(s) Administered   • COVID-19 (MODERNA) 1st,2nd,3rd Dose Monovalent 03/18/2021, 04/15/2021   • COVID-19 (MODERNA) Monovalent Original Booster 12/24/2021   • Fluad Quad 65+ 09/30/2020, 10/15/2021   • Fluzone High Dose =>65 Years ([employer] ONLY) 10/01/2018   • Pneumococcal Conjugate 13-Valent (PCV13) 10/15/2021   • Td 04/14/2008   • Tdap 09/30/2020     We advised/reaffirmed our support/suggestion for staying complete with covid- covid boosters, seasonal flu/yearly and any missing vaccine from list we supplied; we suggest contact with local health department office to review missing/needed vaccines and then bring nursing documentation for these vaccines to this office or call this information in.  "Shingles became \"free\" 1.1.23 for medicare insurance.    Health maintenance:   Body mass index is 29.24 kg/m².  BMI is >= 25 and <30. (Overweight) The following options were offered after discussion;: exercise counseling/recommendations, nutrition counseling/recommendations and not beyond SOB      Tobacco use reviewed:   Erwin Shah Jr.  reports that he has never smoked. He has never used smokeless tobacco..     There are no Patient Instructions on file for this visit.    Follow up: Return for lab;, Dr Carson-.  Future Appointments   Date Time Provider Department Center   6/5/2023 12:00 PM Jeremias Mcnair MD [facility]   11/13/2023  8:20 AM [facility]   11/20/2023  9:15 AM Isidro Carson MD [facility]             "

## 2023-05-16 ENCOUNTER — TELEPHONE (OUTPATIENT)
Dept: CARDIOLOGY | Facility: CLINIC | Age: 73
End: 2023-05-16

## 2023-05-16 NOTE — TELEPHONE ENCOUNTER
Caller: Shilpi Sánchez    Relationship to patient: Emergency Contact    Best call back number: [phone]    Chief complaint: ABNORMAL TESTING    Type of visit: 6 WEEK F/U    Requested date: 6.5.23 EARLY MORNING     If rescheduling, when is the original appointment: 6.5.23 @12     Additional notes:YINKA HAS HAD A FEW TEST COMPLETED AND IS WANTING TO BE SEEN SOONER ON A Monday OR Friday EARLY MORNING. PLEASE ADVISE, THANK YOU.         ”

## 2023-05-24 ENCOUNTER — TELEPHONE (OUTPATIENT)
Dept: FAMILY MEDICINE CLINIC | Facility: CLINIC | Age: 73
End: 2023-05-24

## 2023-05-24 ENCOUNTER — OFFICE VISIT (OUTPATIENT)
Dept: CARDIOLOGY | Facility: CLINIC | Age: 73
End: 2023-05-24
Payer: MEDICARE

## 2023-05-24 VITALS
BODY MASS INDEX: 29.47 KG/M2 | HEART RATE: 58 BPM | HEIGHT: 69 IN | SYSTOLIC BLOOD PRESSURE: 128 MMHG | WEIGHT: 199 LBS | DIASTOLIC BLOOD PRESSURE: 74 MMHG

## 2023-05-24 DIAGNOSIS — I49.9 CARDIAC ARRHYTHMIA, UNSPECIFIED CARDIAC ARRHYTHMIA TYPE: Primary | ICD-10-CM

## 2023-05-24 DIAGNOSIS — I51.89 DIASTOLIC DYSFUNCTION: ICD-10-CM

## 2023-05-24 DIAGNOSIS — Z79.01 ANTICOAGULATED: ICD-10-CM

## 2023-05-24 DIAGNOSIS — R07.9 CHEST PAIN IN ADULT: ICD-10-CM

## 2023-05-24 DIAGNOSIS — M15.0 PRIMARY GENERALIZED (OSTEO)ARTHRITIS: ICD-10-CM

## 2023-05-24 DIAGNOSIS — Z78.9 NON-SMOKER: ICD-10-CM

## 2023-05-24 PROBLEM — R07.89 CHEST DISCOMFORT: Status: RESOLVED | Noted: 2023-03-28 | Resolved: 2023-05-24

## 2023-05-24 RX ORDER — OMEPRAZOLE 20 MG/1
20 CAPSULE, DELAYED RELEASE ORAL DAILY
COMMUNITY

## 2023-05-24 RX ORDER — SODIUM CHLORIDE 0.9 % (FLUSH) 0.9 %
3 SYRINGE (ML) INJECTION EVERY 12 HOURS SCHEDULED
OUTPATIENT
Start: 2023-05-24

## 2023-05-24 RX ORDER — SODIUM CHLORIDE 0.9 % (FLUSH) 0.9 %
10 SYRINGE (ML) INJECTION AS NEEDED
OUTPATIENT
Start: 2023-05-24

## 2023-05-24 RX ORDER — SODIUM CHLORIDE 9 MG/ML
75 INJECTION, SOLUTION INTRAVENOUS CONTINUOUS
OUTPATIENT
Start: 2023-05-24

## 2023-05-24 NOTE — PROGRESS NOTES
.  Erwin Shah Jr.  [id number]  1950  72 y.o.  male    Referring Provider: Isidro Carson MD    Reason for  Visit:  Here for routine follow up      Subjective       Overall feels the same   No new events or complaints since last visit   Overall the patient feels no major change from baseline symptoms   Similar symptoms as during last visit       Palpitations better on Atenolol   Chest discomfort  both with exertion as well as at rest.  Feels episodes of chest pain occur more often with exertion  Moderate substernal,   Pressure like   Chest pain non pleuritic  Chest pain non positional and non gustatory   Lasts less than 5 minutes    Started more than 3 months ago  Occurs once or twice a week on the average but can be variable in frequency  No associated diaphoresis    No associated nausea  No radiation    Relieved with rest or spontaneously  Not positional    No change with intake of food or antacids  No change with breathing  Mild to moderate associated dyspnea    No similar chest pain episodes in the past    Easy fatiguability and increasing tired  Feels energy levels running low      BP elevated at home in past now controlled on beta blocker therapy        History of present illness:  Erwin Shah Jr. is a 72 y.o. yo male with essential hypertension who presents today for   Chief Complaint   Patient presents with    Follow-up     Results recent echo    .    History  Past Medical History:   Diagnosis Date    Allergic     Arthritis     Asthma     GERD (gastroesophageal reflux disease)     HL (hearing loss)     Hyperlipidemia     Hypertension    ,   Past Surgical History:   Procedure Laterality Date    CHOLECYSTECTOMY      COLONOSCOPY N/A 7/31/2017    Procedure: COLONOSCOPY;  Surgeon: Ajith Perez MD;  Location: [facility] ENDOSCOPY;  Service:     COLONOSCOPY N/A 8/26/2022    Procedure: COLONOSCOPY WITH ANESTHESIA;  Surgeon: Ajith Perez MD;  Location: [facility] ENDOSCOPY;  Service: Gastroenterology;   Laterality: N/A;  pre hx polyp  post normal  Isidro Carson MD    ENDOSCOPY  05/07/2010   ,   Family History   Problem Relation Age of Onset    No Known Problems Mother     Liver disease Father     Colon cancer Neg Hx    ,   Social History     Tobacco Use    Smoking status: Never    Smokeless tobacco: Never   Substance Use Topics    Alcohol use: Yes     Alcohol/week: 5.0 standard drinks     Types: 2 Cans of beer, 3 Shots of liquor per week     Comment: occasional    Drug use: No   ,     Medications  Current Outpatient Medications   Medication Sig Dispense Refill    aspirin  MG tablet Take 1 tablet by mouth Daily.      atenolol (TENORMIN) 25 MG tablet Take 1 tablet by mouth Daily. 30 tablet 11    atorvastatin (Lipitor) 20 MG tablet Take 1 tablet by mouth Daily. 90 tablet 3    enalapril (VASOTEC) 20 MG tablet TAKE 1 TABLET BY MOUTH DAILY. MUST KEEP APPT 90 tablet 2    hydroCHLOROthiazide (HYDRODIURIL) 12.5 MG tablet Take 1 tablet by mouth Daily. 90 tablet 3    omeprazole (priLOSEC) 20 MG capsule Take 1 capsule by mouth Daily.      Loratadine 10 MG capsule Take 1 capsule by mouth Daily. (Patient not taking: Reported on 5/24/2023)      meclizine (ANTIVERT) 25 MG tablet Take 1 tablet by mouth As Needed for Dizziness (rare). (Patient not taking: Reported on 5/24/2023)      nitroglycerin (NITROSTAT) 0.4 MG SL tablet 1 under the tongue as needed for angina, may repeat q5mins for up three doses (Patient not taking: Reported on 5/24/2023) 25 tablet 3    Omega-3 Fatty Acids (fish oil) 1000 MG capsule capsule Take 1 capsule by mouth Daily With Breakfast. (Patient not taking: Reported on 5/24/2023)       No current facility-administered medications for this visit.       Allergies:  Patient has no known allergies.    Review of Systems  Review of Systems   Constitutional: Negative.   HENT: Negative.     Eyes: Negative.    Cardiovascular:  Positive for dyspnea on exertion and palpitations. Negative for chest pain,  "claudication, cyanosis, irregular heartbeat, leg swelling, near-syncope, orthopnea, paroxysmal nocturnal dyspnea and syncope.   Respiratory: Negative.     Endocrine: Negative.    Hematologic/Lymphatic: Negative.    Skin: Negative.    Musculoskeletal:  Positive for arthritis, back pain and joint pain.   Gastrointestinal:  Negative for anorexia.   Genitourinary: Negative.    Neurological: Negative.    Psychiatric/Behavioral: Negative.       Objective     Physical Exam:  /74   Pulse 58   Ht 175.3 cm (69\")   Wt 90.3 kg (199 lb)   BMI 29.39 kg/m²     Physical Exam  Constitutional:       Appearance: He is well-developed.   HENT:      Head: Normocephalic.   Neck:      Vascular: Normal carotid pulses. No carotid bruit or JVD.      Trachea: No tracheal tenderness or tracheal deviation.   Cardiovascular:      Rate and Rhythm: Regular rhythm.      Pulses: Normal pulses.      Heart sounds: Normal heart sounds.   Pulmonary:      Effort: Pulmonary effort is normal.      Breath sounds: No stridor.   Abdominal:      General: There is no distension.      Palpations: Abdomen is soft.      Tenderness: There is no abdominal tenderness.   Musculoskeletal:      Cervical back: No edema.   Skin:     General: Skin is warm.   Neurological:      Mental Status: He is alert.      Cranial Nerves: No cranial nerve deficit.      Sensory: No sensory deficit.   Psychiatric:         Speech: Speech normal.         Behavior: Behavior normal.       Results Review:    Erwin Shah JrGloria  Holter Monitor >48 Hrs Up To 7 Days    Order# [id number]  Reading physician: Prince Galvan MD Ordering physician: Isidro Carson MD Study date: 3/24/23     Patient Information    Patient Name  Erwin Shah Jr. MRN  [id number] Legal Sex  Male  (Age)  1950 (72 y.o.)     Interpretation Summary         An abnormal monitor study.    The predominant rhythm is sinus rhythm with an average heart rate of 67 bpm.    Rare PACs with a few short runs of " SVT, the longest being only 6.3 seconds in duration.  No symptoms were reported.    Frequent PVCs noted, predominately isolated PVCs with an overall burden of 23%.    No symptoms reported.       Results for orders placed during the hospital encounter of 23    Adult Transthoracic Echo Complete w/ Color, Spectral and Contrast if necessary per protocol    Interpretation Summary    Left ventricular systolic function is normal. Left ventricular ejection fraction appears to be 56 - 60%.    Left ventricular diastolic function was normal.    Estimated right ventricular systolic pressure from tricuspid regurgitation is normal (<35 mmHg).    Normal global longitudinal LV strain (GLS) = -21.6%.     Family Cardiac History as of 2021    No family history on file.     [facility] PACS Images     Show images for Adult Stress Echo W/ Cont or Stress Agent if Necessary Per Protocol    Interpretation Summary    Agosto treadmill score of 9  No ischemic changes during stress  Frequent PVCs noted throughout the exam  No clinical symptoms of ischemia during stress  Excellent functional capacity at 10 metabolic equivalents  Appropriate blood pressure and heart rate response to stress  Segment augmentation had a normal response to stress. Cavity size behaved normally in response to stress.  Normal stress echo consistent with a low risk study for myocardial ischemia.  Overall this is a low risk test for ischemia. Frequent PVCs noted throughout the entire exam.     Eriwn Shah Jr.  Complete Transthoracic Echocardiogram with Complete Doppler and Color Flow  Order# [id number]  Reading physician: Ronnie Pfeiffer MD Ordering physician: Isidro Carson MD Study date: 21     Patient Information    Patient Name   Erwin Shah Jr. MRN   [id number] Legal Sex   Male  (Age)   1950 (72 y.o.)     Patient Hx Of Height, Weight, and Vitals    Height Weight BSA (Calculated - sq m) BMI (Calculated) Retired BMI (kg/m2) Pulse BP   175.3  "cm (69.02\") 86.6 kg (191 lb) 2.03 sq meters 28.2 28.25  150/84     [facility] PACS Images     Show images for Adult Transthoracic Echo Complete W/ Cont if Necessary Per Protocol      Clinical Indication    Palpitations; Arrhythmia   Dx: Essential hypertension [I10 (ICD-10-CM)]; Palpitations [R00.2 (ICD-10-CM)]; Elevated blood pressure reading [R03.0 (ICD-10-CM)]; Cardiac arrhythmia, unspecified cardiac arrhythmia type [I49.9 (ICD-10-CM)]; Dyspnea, unspecified type [R06.00 (ICD-10-CM)]     Interpretation Summary    Left ventricular ejection fraction appears to be 56 - 60%. Left ventricular systolic function is normal.  Left ventricular diastolic dysfunction is noted.  Normal right ventricular cavity size and systolic function noted.  There is no significant (greater than mild) valvular dysfunction.  Estimated right ventricular systolic pressure from tricuspid regurgitation is mildly elevated (35-45 mmHg).              ____________________________________________________________________________________________________________________________________________  Health maintenance and recommendations    Low salt/ HTN/ Heart healthy carbohydrate restricted cardiac diet   The patient is advised to reduce or avoid caffeine or other cardiac stimulants.   Minimize or avoid  NSAID-type medications      Monitor for any signs of bleeding including red or dark stools. Fall precautions.   Advised staying uptodate with immunizations per established standard guidelines.    Offered to give patient  a copy of my notes     Questions were encouraged, asked and answered to the patient's  understanding and satisfaction. Questions if any regarding current medications and side effects, need for refills and importance of compliance to medications stressed.    Reviewed available prior notes, consults, prior visits, laboratory findings, radiology and cardiology relevant reports. Updated chart as applicable. I have reviewed the patient's medical " history in detail and updated the computerized patient record as relevant.      Updated patient regarding any new or relevant abnormalities on review of records or any new findings on physical exam. Mentioned to patient about purpose of visit and desirable health short and long term goals and objectives.    Primary to monitor CBC CMP Lipid panel and TSH as applicable    ___________________________________________________________________________________________________________________________________________   Procedures    Assessment & Plan   Diagnoses and all orders for this visit:    1. Cardiac arrhythmia, unspecified cardiac arrhythmia type (Primary)    2. Anticoagulated prevention+DJD+angina/, ()ASA 81x2)    3. Diastolic dysfunction    4. Non-smoker    5. Primary generalized (osteo)arthritis          Plan    Continue same medications   Continue beta blocker therapy    Monitor for any signs of bleeding including red or dark stools as well as easy bruisabilty. Fall precautions.       Patient expressed understanding  Encouraged and answered all questions   Discussed with the patient and all questioned fully answered. He will call me if any problems arise.   Discussed results of prior testing with patient : outpatient cardiac telemetry, echo and stress echoes  as well electrocardiogram in chart   Will repeat zio in future for PVC burden     Recommend cardiac catheterization, selective coronary angiography, left ventriculography and percutaneous coronary intervention with application of arteriotomy hemostatic closure device.  I discussed cardiac catheterization, the procedure, risks (including bleeding, infection, vascular damage [including minor oozing, bruising, bleeding, and up to and including but not limited to the need for vascular surgery, emergency cardiothoracic surgery, contrast reaction, renal failure, respiratory failure, heart attack, stroke, arrhythmia and even death), benefits, and alternatives  and the patient has voiced understanding and is willing to proceed.  Adequate pre-hydration and post cardiac catheterization hydration.  Premedications as required and indicated for cardiac catheterization.  No contraindication to drug eluting stent placement if required  Further recommendations pending results of cardiac catheterization      I support the patient's decision to take the Covid -19 vaccine   Had required complete course   No major issues   Now fully immunized    Had booster too     S/L NTG PRN for chest pain, call me or go to ER if has to use S/L nitroglycerin       Check BP and heart rates twice daily initially till blood pressures and heart rates under good control and then at least 3x / week,   If blood pressures continue to be well-controlled then can check week a month  at home and bring a recording for review next visit  If BP >130/85 or < 100/60 persistently over 3 reading 30 mins apart or if heart rates persistently above 100 bpm or less than 55 bpm call sooner for evaluation and advise              Return in about 3 months (around 8/24/2023).

## 2023-05-24 NOTE — TELEPHONE ENCOUNTER
Caller: Ewrin Shah Jr.    Relationship: Self    Best call back number: [phone]    What is the best time to reach you: ANYTIME    Who are you requesting to speak with (clinical staff, provider,  specific staff member): CLINICAL        What was the call regarding: PATIENT CALLED TO LET DR. BOYD KNOW THAT HE HAD HIS [id number] SHOT (PNEUMONIA SHOT) ON 5/19/23.    ALSO, HE SAW DR. AMBIKA HARTMANN THIS MORNING (5/24) AND HE SETUP A HEART CATH ON 6/26/23.    Do you require a callback: NO

## 2023-05-25 PROBLEM — R07.9 CHEST PAIN IN ADULT: Status: ACTIVE | Noted: 2023-05-25

## 2023-09-11 ENCOUNTER — OFFICE VISIT (OUTPATIENT)
Dept: CARDIOLOGY | Facility: CLINIC | Age: 73
End: 2023-09-11
Payer: MEDICARE

## 2023-09-11 VITALS
WEIGHT: 197 LBS | OXYGEN SATURATION: 97 % | BODY MASS INDEX: 29.18 KG/M2 | HEART RATE: 61 BPM | HEIGHT: 69 IN | SYSTOLIC BLOOD PRESSURE: 155 MMHG | DIASTOLIC BLOOD PRESSURE: 78 MMHG

## 2023-09-11 DIAGNOSIS — I10 ESSENTIAL HYPERTENSION: Chronic | ICD-10-CM

## 2023-09-11 DIAGNOSIS — Z79.01 ANTICOAGULATED: ICD-10-CM

## 2023-09-11 DIAGNOSIS — E78.2 MIXED HYPERLIPIDEMIA: Chronic | ICD-10-CM

## 2023-09-11 DIAGNOSIS — I51.89 DIASTOLIC DYSFUNCTION: ICD-10-CM

## 2023-09-11 DIAGNOSIS — R00.2 PALPITATIONS: ICD-10-CM

## 2023-09-11 DIAGNOSIS — I10 PRIMARY HYPERTENSION: Primary | Chronic | ICD-10-CM

## 2023-09-11 RX ORDER — ATORVASTATIN CALCIUM 20 MG/1
TABLET, FILM COATED ORAL
Qty: 90 TABLET | Refills: 3 | Status: SHIPPED | OUTPATIENT
Start: 2023-09-11

## 2023-09-11 RX ORDER — ENALAPRIL MALEATE 20 MG/1
20 TABLET ORAL DAILY
Qty: 90 TABLET | Refills: 2 | Status: SHIPPED | OUTPATIENT
Start: 2023-09-11

## 2023-09-11 RX ORDER — ATENOLOL 50 MG/1
50 TABLET ORAL DAILY
Qty: 90 TABLET | Refills: 3 | Status: SHIPPED | OUTPATIENT
Start: 2023-09-11

## 2023-09-11 RX ORDER — NITROGLYCERIN 0.4 MG/1
TABLET SUBLINGUAL
Qty: 25 TABLET | Refills: 3 | Status: SHIPPED | OUTPATIENT
Start: 2023-09-11

## 2023-09-11 NOTE — PROGRESS NOTES
.  Erwin Shah Jr.  [id number]  1950  72 y.o.  male    Referring Provider: Isidro Carson MD    Reason for  Visit:  Here for routine follow up  Cardiac workup test results as below       Subjective    Mild chronic exertional shortness of breath on exertion relieved with rest  No significant cough or wheezing    No palpitations  No associated chest pain  No significant pedal edema    No fever or chills  No significant expectoration    No hemoptysis  No presyncope or syncope    Tolerating current medications well with no untoward side effects   Compliant with prescribed medication regimen. Tries to adhere to cardiac diet.     Excellent effort tolerance with no cardiovascular limitations and at the patient's baseline   BP elevated at home in past now better controlled on beta blocker therapy   Systolics 150s        History of present illness:  Erwin Shah Jr. is a 72 y.o. yo male with essential hypertension who presents today for   Chief Complaint   Patient presents with    Hypertension     2 MO FU- HTN -S/P HEART CATH 06/26/23   .    History  Past Medical History:   Diagnosis Date    Allergic     Arthritis     Asthma     GERD (gastroesophageal reflux disease)     HL (hearing loss)     Hyperlipidemia     Hypertension    ,   Past Surgical History:   Procedure Laterality Date    CARDIAC CATHETERIZATION Left 6/26/2023    Procedure: Cardiac Catheterization/Vascular Study;  Surgeon: Jeremias Mcnair MD;  Location: [facility] CATH INVASIVE LOCATION;  Service: Cardiology;  Laterality: Left;    CHOLECYSTECTOMY      COLONOSCOPY N/A 7/31/2017    Procedure: COLONOSCOPY;  Surgeon: Ajith Perez MD;  Location: [facility] ENDOSCOPY;  Service:     COLONOSCOPY N/A 8/26/2022    Procedure: COLONOSCOPY WITH ANESTHESIA;  Surgeon: Ajith Perez MD;  Location: [facility] ENDOSCOPY;  Service: Gastroenterology;  Laterality: N/A;  pre hx polyp  post normal  Isidro Carson MD    ENDOSCOPY  05/07/2010   ,   Family History   Problem  Relation Age of Onset    No Known Problems Mother     Liver disease Father     Colon cancer Neg Hx    ,   Social History     Tobacco Use    Smoking status: Never     Passive exposure: Never    Smokeless tobacco: Never   Vaping Use    Vaping Use: Never used   Substance Use Topics    Alcohol use: Yes     Alcohol/week: 5.0 standard drinks     Types: 2 Cans of beer, 3 Shots of liquor per week     Comment: occasional    Drug use: No   ,     Medications  Current Outpatient Medications   Medication Sig Dispense Refill    aspirin  MG tablet Take 1 tablet by mouth Daily.      atenolol (TENORMIN) 50 MG tablet Take 1 tablet by mouth Daily. 90 tablet 3    atorvastatin (Lipitor) 20 MG tablet Take 1 tablet by mouth Daily. 90 tablet 3    enalapril (VASOTEC) 20 MG tablet TAKE 1 TABLET BY MOUTH DAILY. MUST KEEP APPT 90 tablet 2    hydroCHLOROthiazide (HYDRODIURIL) 12.5 MG tablet Take 1 tablet by mouth Daily. 90 tablet 3    meclizine (ANTIVERT) 25 MG tablet Take 1 tablet by mouth As Needed for Dizziness (rare).      Omega-3 Fatty Acids (fish oil) 1000 MG capsule capsule Take 1 capsule by mouth Daily With Breakfast.      Loratadine 10 MG capsule Take 1 capsule by mouth Daily. (Patient not taking: Reported on 9/11/2023)      nitroglycerin (NITROSTAT) 0.4 MG SL tablet 1 under the tongue as needed for angina, may repeat q5mins for up three doses 25 tablet 3    omeprazole (priLOSEC) 20 MG capsule Take 1 capsule by mouth Daily. (Patient not taking: Reported on 9/11/2023)       No current facility-administered medications for this visit.       Allergies:  Patient has no known allergies.    Review of Systems  Review of Systems   Constitutional: Negative.   HENT: Negative.     Eyes: Negative.    Cardiovascular:  Positive for dyspnea on exertion. Negative for chest pain, claudication, cyanosis, irregular heartbeat, leg swelling, near-syncope, orthopnea, palpitations, paroxysmal nocturnal dyspnea and syncope.   Respiratory: Negative.    "  Endocrine: Negative.    Hematologic/Lymphatic: Negative.    Skin: Negative.    Musculoskeletal:  Positive for arthritis, back pain and joint pain.   Gastrointestinal:  Negative for anorexia.   Genitourinary: Negative.    Neurological: Negative.    Psychiatric/Behavioral: Negative.       Objective     Physical Exam:  /78 (BP Location: Left arm, Patient Position: Sitting, Cuff Size: Adult)   Pulse 61   Ht 175.3 cm (69.02\")   Wt 89.4 kg (197 lb)   SpO2 97%   BMI 29.08 kg/m²     Physical Exam  Constitutional:       Appearance: He is well-developed.   HENT:      Head: Normocephalic.   Neck:      Vascular: Normal carotid pulses. No carotid bruit or JVD.      Trachea: No tracheal tenderness or tracheal deviation.   Cardiovascular:      Rate and Rhythm: Regular rhythm.      Pulses: Normal pulses.      Heart sounds: Normal heart sounds.   Pulmonary:      Effort: Pulmonary effort is normal.      Breath sounds: No stridor.   Abdominal:      General: There is no distension.      Palpations: Abdomen is soft.      Tenderness: There is no abdominal tenderness.   Musculoskeletal:      Cervical back: No edema.   Skin:     General: Skin is warm.   Neurological:      Mental Status: He is alert.      Cranial Nerves: No cranial nerve deficit.      Sensory: No sensory deficit.   Psychiatric:         Speech: Speech normal.         Behavior: Behavior normal.       Results Review:    Conclusion of cardiac catheterization    6/26/2023        Coronary angiography:     Normal left main coronary artery  Left anterior descending coronary artery has mild atherosclerotic changes without any obstructive disease  Mild atherosclerotic changes of the proximal diagonal branch with 30 to 40% nonobstructive stenosis  No significant disease of left circumflex coronary artery  Normal obtuse marginal branches  Codominant small right coronary artery without any significant disease      Left heart cath: LVEDP   15  mm Hg with no gradient across " aortic valve on pullback.      LV Gram in Normal LVEF 65% with no significant mitral regurgitation.     Interventions: None        ____________________________________________________________________________________________________________________________________________     Plan after cardiac catheterization     False positive stress test  No evidence of any significant obstructive coronary artery disease identified  Usual post procedure precautions after arteriotomy including monitoring for signs both local and systemic side effects.  On ultimate discharge will receive printed instructions  Intensive risk factor modifications for both primary and secondary prevention if applicable  Hydration   Observation       Erwin Shah   Holter Monitor >48 Hrs Up To 7 Days    Order# [id number]  Reading physician: Prince Galvan MD Ordering physician: Isidro Carson MD Study date: 3/24/23     Patient Information    Patient Name  Erwin Shah Jr. MRN  [id number] Legal Sex  Male  (Age)  1950 (72 y.o.)     Interpretation Summary         An abnormal monitor study.    The predominant rhythm is sinus rhythm with an average heart rate of 67 bpm.    Rare PACs with a few short runs of SVT, the longest being only 6.3 seconds in duration.  No symptoms were reported.    Frequent PVCs noted, predominately isolated PVCs with an overall burden of 23%.    No symptoms reported.       Results for orders placed during the hospital encounter of 23    Adult Transthoracic Echo Complete w/ Color, Spectral and Contrast if necessary per protocol    Interpretation Summary    Left ventricular systolic function is normal. Left ventricular ejection fraction appears to be 56 - 60%.    Left ventricular diastolic function was normal.    Estimated right ventricular systolic pressure from tricuspid regurgitation is normal (<35 mmHg).    Normal global longitudinal LV strain (GLS) = -21.6%.     Family Cardiac History as of  "2021    No family history on file.     [employer] PACS Images     Show images for Adult Stress Echo W/ Cont or Stress Agent if Necessary Per Protocol    Interpretation Summary    Agosto treadmill score of 9  No ischemic changes during stress  Frequent PVCs noted throughout the exam  No clinical symptoms of ischemia during stress  Excellent functional capacity at 10 metabolic equivalents  Appropriate blood pressure and heart rate response to stress  Segment augmentation had a normal response to stress. Cavity size behaved normally in response to stress.  Normal stress echo consistent with a low risk study for myocardial ischemia.  Overall this is a low risk test for ischemia. Frequent PVCs noted throughout the entire exam.     Erwin Shah Jr.  Complete Transthoracic Echocardiogram with Complete Doppler and Color Flow  Order# [id number]  Reading physician: Ronnie Pfeiffer MD Ordering physician: Isidro Carson MD Study date: 21     Patient Information    Patient Name   Erwin Shah Jr. MRN   [id number] Legal Sex   Male  (Age)   1950 (72 y.o.)     Patient Hx Of Height, Weight, and Vitals    Height Weight BSA (Calculated - sq m) BMI (Calculated) Retired BMI (kg/m2) Pulse BP   175.3 cm (69.02\") 86.6 kg (191 lb) 2.03 sq meters 28.2 28.25  150/84     [employer] PACS Images     Show images for Adult Transthoracic Echo Complete W/ Cont if Necessary Per Protocol      Clinical Indication    Palpitations; Arrhythmia   Dx: Essential hypertension [I10 (ICD-10-CM)]; Palpitations [R00.2 (ICD-10-CM)]; Elevated blood pressure reading [R03.0 (ICD-10-CM)]; Cardiac arrhythmia, unspecified cardiac arrhythmia type [I49.9 (ICD-10-CM)]; Dyspnea, unspecified type [R06.00 (ICD-10-CM)]     Interpretation Summary    Left ventricular ejection fraction appears to be 56 - 60%. Left ventricular systolic function is normal.  Left ventricular diastolic dysfunction is noted.  Normal right ventricular cavity size and systolic function " noted.  There is no significant (greater than mild) valvular dysfunction.  Estimated right ventricular systolic pressure from tricuspid regurgitation is mildly elevated (35-45 mmHg).              ____________________________________________________________________________________________________________________________________________  Health maintenance and recommendations    Low salt/ HTN/ Heart healthy carbohydrate restricted cardiac diet   The patient is advised to reduce or avoid caffeine or other cardiac stimulants.   Minimize or avoid  NSAID-type medications      Monitor for any signs of bleeding including red or dark stools. Fall precautions.   Advised staying uptodate with immunizations per established standard guidelines.    Offered to give patient  a copy of my notes     Questions were encouraged, asked and answered to the patient's  understanding and satisfaction. Questions if any regarding current medications and side effects, need for refills and importance of compliance to medications stressed.    Reviewed available prior notes, consults, prior visits, laboratory findings, radiology and cardiology relevant reports. Updated chart as applicable. I have reviewed the patient's medical history in detail and updated the computerized patient record as relevant.      Updated patient regarding any new or relevant abnormalities on review of records or any new findings on physical exam. Mentioned to patient about purpose of visit and desirable health short and long term goals and objectives.    Primary to monitor CBC CMP Lipid panel and TSH as applicable    ___________________________________________________________________________________________________________________________________________     ECG 12 Lead    Date/Time: 9/11/2023 8:32 AM  Performed by: Jeremias Mcnair MD  Authorized by: Jeremias Mcnair MD   Comparison: compared with previous ECG from 3/19/2023  Comparison to previous ECG: Ventricular rate  changed from 69   to 61 beats per minute   premature ventricular contractions resolved     Rhythm: sinus rhythm  Rate: normal  QRS axis: normal  Other findings: poor R wave progression    Clinical impression: abnormal EKG        Assessment & Plan   Diagnoses and all orders for this visit:    1. Primary hypertension (Primary)    2. Palpitations  -     Holter Monitor - 72 Hour Up To 15 Days; Future    3. Diastolic dysfunction    4. Mixed hyperlipidemia    5. Anticoagulated prevention+DJD+angina/, ()ASA 81x2)    Other orders  -     ECG 12 Lead  -     atenolol (TENORMIN) 50 MG tablet; Take 1 tablet by mouth Daily.  Dispense: 90 tablet; Refill: 3          Plan    Continue same medications   Continue beta blocker therapy    Monitor for any signs of bleeding including red or dark stools as well as easy bruisabilty. Fall precautions.       Patient expressed understanding  Encouraged and answered all questions   Discussed with the patient and all questioned fully answered. He will call me if any problems arise.   Discussed results of prior testing with patient : outpatient cardiac telemetry, echo, stress echoes and cath   as well electrocardiogram from today with resolution of premature ventricular contractions   Will repeat zio for PVC burden   Orders Placed This Encounter   Procedures    Holter Monitor - 72 Hour Up To 15 Days     Standing Status:   Future     Standing Expiration Date:   9/11/2024     Order Specific Question:   Reason for exam?     Answer:   Palpitations     Order Specific Question:   How many days is the patient to wear the monitor?     Answer:   5     Order Specific Question:   Release to patient     Answer:   Routine Release [[id number]]    ECG 12 Lead     This order was created via procedure documentation     Order Specific Question:   Release to patient     Answer:   Routine Release [[id number]]          I support the patient's decision to take the Covid -19 vaccine   Had required complete  course   No major issues   Now fully immunized    Had booster too     S/L NTG PRN for chest pain, call me or go to ER if has to use S/L nitroglycerin     Needs better BP control      Requested Prescriptions     Signed Prescriptions Disp Refills    atenolol (TENORMIN) 50 MG tablet 90 tablet 3     Sig: Take 1 tablet by mouth Daily.        Check BP and heart rates twice daily initially till blood pressures and heart rates under good control and then at least 3x / week,   If blood pressures continue to be well-controlled then can check week a month  at home and bring a recording for review next visit  If BP >130/85 or < 100/60 persistently over 3 reading 30 mins apart or if heart rates persistently above 100 bpm or less than 55 bpm call sooner for evaluation and advise     He declined testing for obstructive sleep apnea                Return in about 6 months (around 3/11/2024).

## 2023-09-19 ENCOUNTER — TELEPHONE (OUTPATIENT)
Dept: CARDIOLOGY | Facility: CLINIC | Age: 73
End: 2023-09-19
Payer: MEDICARE

## 2023-09-19 DIAGNOSIS — I10 ESSENTIAL HYPERTENSION: Chronic | ICD-10-CM

## 2023-09-19 RX ORDER — HYDROCHLOROTHIAZIDE 25 MG/1
25 TABLET ORAL DAILY
Qty: 90 TABLET | Refills: 3 | Status: SHIPPED | OUTPATIENT
Start: 2023-09-19

## 2023-09-19 RX ORDER — ATENOLOL 25 MG/1
25 TABLET ORAL DAILY
Qty: 90 TABLET | Refills: 3 | Status: SHIPPED | OUTPATIENT
Start: 2023-09-19

## 2023-09-19 NOTE — TELEPHONE ENCOUNTER
Patient has called to notify us that Dr.Sanjay Mcnair increased his Atenolol to 50 mg daily at his LOV, Erwin states that his BP/HR have ran low for him ever since and that he's having dry mouth and dizziness. He thinks the medication increase was possibly too much for him. Erwin states that his BP is currently 116/62 HR 48 and that it's consistently ran in that range since medication increase.    Please advise.    Thanks  WF

## 2023-10-23 ENCOUNTER — TELEPHONE (OUTPATIENT)
Dept: CARDIOLOGY | Facility: CLINIC | Age: 73
End: 2023-10-23
Payer: MEDICARE

## 2023-10-23 NOTE — TELEPHONE ENCOUNTER
Caller: Erwin Shah Jr.    Relationship: Self    Best call back number: [phone]    Who is your current provider: DR. HARTMANN    Who would you like your new provider to be: JENNA    What are your reasons for transferring care: PATIENT HEARS THAT DR. WEST IS S GREAT DOCTOR AND WOULD LIKE TO SWITCH    Additional notes: NONE

## 2024-01-16 DIAGNOSIS — I10 ESSENTIAL HYPERTENSION: ICD-10-CM

## 2024-01-16 RX ORDER — HYDROCHLOROTHIAZIDE 12.5 MG/1
12.5 TABLET ORAL DAILY
Qty: 90 TABLET | Refills: 3 | Status: SHIPPED | OUTPATIENT
Start: 2024-01-16

## 2024-06-06 PROBLEM — I47.10 PSVT (PAROXYSMAL SUPRAVENTRICULAR TACHYCARDIA): Status: ACTIVE | Noted: 2024-06-06

## 2024-06-06 PROBLEM — E78.5 HYPERLIPIDEMIA LDL GOAL <70: Status: ACTIVE | Noted: 2017-03-11

## 2024-06-06 PROBLEM — I49.3 FREQUENT PVCS: Status: ACTIVE | Noted: 2024-06-06

## 2024-06-06 PROBLEM — I47.29 NSVT (NONSUSTAINED VENTRICULAR TACHYCARDIA): Status: ACTIVE | Noted: 2024-06-06

## 2024-06-06 PROBLEM — I25.10 NON-OCCLUSIVE CORONARY ARTERY DISEASE: Status: ACTIVE | Noted: 2023-03-28

## 2024-06-06 NOTE — PROGRESS NOTES
Chief Complaint  Hypertension (9mo F/U)    Subjective          Erwin Shah Jr. presents to [facility] CARDIOLOGY for routine follow-up.  He has nonocclusive coronary artery disease (30 to 40% stenosis of the proximal diagonal branch on left heart catheterization 6/26/2023), left ventricular diastolic dysfunction paroxysmal supraventricular tachycardia, nonsustained ventricular tachycardia, frequent PVCs, hypertension, and hyperlipidemia. He reports chronic dyspnea with moderate to heavy exertion that has not worsened.  Patient denies chest pain, palpitations, dizziness, syncope, orthopnea, PND, edema or decreased stamina.  Patient denies any signs of bleeding.    Coronary Artery Disease  Presents for follow-up visit. Symptoms include shortness of breath. Pertinent negatives include no chest pain, chest pressure, chest tightness, dizziness, leg swelling, muscle weakness, palpitations or weight gain. Risk factors include hyperlipidemia. The symptoms have been stable. Compliance with diet is variable. Compliance with exercise is variable. Compliance with medications is good.   Hypertension  This is a chronic problem. The current episode started more than 1 year ago. The problem is controlled. Associated symptoms include shortness of breath. Pertinent negatives include no anxiety, blurred vision, chest pain, headaches, malaise/fatigue, neck pain, orthopnea, palpitations, peripheral edema, PND or sweats. Risk factors for coronary artery disease include male gender and dyslipidemia. Current antihypertension treatment includes diuretics, beta blockers and ACE inhibitors. The current treatment provides significant improvement.   Hyperlipidemia  This is a chronic problem. The current episode started more than 1 year ago. Associated symptoms include shortness of breath. Pertinent negatives include no chest pain. Current antihyperlipidemic treatment includes statins. Risk factors for coronary artery disease  "include hypertension, male sex and dyslipidemia.       Objective     Current Outpatient Medications:     aspirin  MG tablet, Take 1 tablet by mouth Daily., Disp: , Rfl:     atenolol (TENORMIN) 25 MG tablet, Take 1 tablet by mouth Daily., Disp: 90 tablet, Rfl: 3    atorvastatin (LIPITOR) 20 MG tablet, TAKE 1 TABLET BY MOUTH EVERY DAY, Disp: 90 tablet, Rfl: 3    Cholecalciferol (Vitamin D3) 50 MCG (2000 UT) tablet, Take  by mouth., Disp: , Rfl:     enalapril (VASOTEC) 20 MG tablet, Take 2 tablets by mouth Daily. MUST KEEP APPT, Disp: 180 tablet, Rfl: 3    hydroCHLOROthiazide (HYDRODIURIL) 12.5 MG tablet, TAKE 1 TABLET BY MOUTH EVERY DAY, Disp: 90 tablet, Rfl: 3    meclizine (ANTIVERT) 25 MG tablet, Take 1 tablet by mouth As Needed for Dizziness (rare)., Disp: , Rfl:     nitroglycerin (NITROSTAT) 0.4 MG SL tablet, 1 under the tongue as needed for angina, may repeat q5mins for up three doses, Disp: 25 tablet, Rfl: 3    omeprazole (priLOSEC) 20 MG capsule, Take 1 capsule by mouth Daily., Disp: , Rfl:   Vital Signs:   /80   Pulse 63   Ht 175.3 cm (69\")   Wt 88 kg (194 lb)   SpO2 98%   BMI 28.65 kg/m²     Vitals and nursing note reviewed.   Constitutional:       General: Awake.      Appearance: Normal and healthy appearance. Well-developed, normal weight, overweight and not in distress.   Eyes:      General: Lids are normal.      Conjunctiva/sclera: Conjunctivae normal.      Pupils: Pupils are equal, round, and reactive to light.   HENT:      Head: Normocephalic and atraumatic.      Nose: Nose normal.   Neck:      Vascular: No JVR. JVD normal.   Pulmonary:      Effort: Pulmonary effort is normal.      Breath sounds: Normal breath sounds. No wheezing. No rhonchi. No rales.   Chest:      Chest wall: Not tender to palpatation.   Cardiovascular:      PMI at left midclavicular line. Normal rate. Regular rhythm. Normal S1. Normal S2.       Murmurs: There is no murmur.      No gallop.  No click. No rub. "   Pulses:     Intact distal pulses.   Edema:     Peripheral edema absent.   Abdominal:      General: Bowel sounds are normal.      Palpations: Abdomen is soft.      Tenderness: There is no abdominal tenderness.   Musculoskeletal: Normal range of motion.         General: No tenderness.      Cervical back: Normal range of motion. Skin:     General: Skin is warm and dry.   Neurological:      General: No focal deficit present.      Mental Status: Alert, oriented to person, place, and time and oriented to person, place and time.   Psychiatric:         Attention and Perception: Attention and perception normal.         Mood and Affect: Mood and affect normal.         Speech: Speech normal.         Behavior: Behavior normal. Behavior is cooperative.         Thought Content: Thought content normal.         Cognition and Memory: Cognition and memory normal.         Judgment: Judgment normal.        Result Review :   The following data was reviewed by: CHRISSY Schwarz on 06/10/2024:  Common labs          6/26/2023    09:42 10/26/2023    09:34   Common Labs   Glucose 103     Glucose  98       BUN 14  11       Creatinine 1.01  1.1       Sodium 141  139       Potassium 4.2  4.6       Chloride 105  103       Calcium 9.0  8.9       Albumin 3.9  4.0       Total Bilirubin 0.8  0.71       Alkaline Phosphatase 102  80       AST (SGOT) 25  21       ALT (SGPT) 30  31       WBC 7.56     Hemoglobin 13.7     Hematocrit 41.1     Platelets 248     Total Cholesterol  101       Triglycerides  63       HDL Cholesterol  42       LDL Cholesterol   46       PSA  1.96          Details          This result is from an external source.             Data reviewed : Cardiology studies 2d echo 6/17/21 and 4/28/23, left heart catheterization 6/26/23 and Holter monitor 9/11/2023      ECG 12 Lead    Date/Time: 6/10/2024 9:26 AM  Performed by: Ayana Guy APRN    Authorized by: Ayana Guy APRN  Comparison: compared with previous ECG  from 9/11/2023  Similar to previous ECG  Rhythm: sinus rhythm  Rate: normal  BPM: 63  QRS axis: normal    Clinical impression: normal ECG            Assessment and Plan    Diagnoses and all orders for this visit:    1. Non-occlusive coronary artery disease (Primary)-30 to 40% stenosis of the proximal diagonal branch on left heart catheterization 6/26/2023.  No clinical signs of ischemia.  Continue aspirin.    2. PSVT (paroxysmal supraventricular tachycardia)-5 runs with longest duration of 11.7 seconds on most recent Holter monitor.  Stable.  Continue atenolol.    3. NSVT (nonsustained ventricular tachycardia)-1 run with duration of 16 beats on most recent Holter monitor.  Stable.  Continue atenolol.    4. Diastolic dysfunction-left ventricular diastolic dysfunction on 2D echo 6/17/2021.  Normal function on most recent 2D echo 4/28/2023.  No clinical signs or symptoms of congestive heart failure. Reviewed signs and symptoms of CHF and what to report with the patient. Patient instructed to restrict sodium and weigh daily. Report weight gain of greater than 2 lbs overnight or 5 lbs in 1 week. Pt verbalized understanding of instructions and plan of care.     5. Frequent PVCs-6.5% burden on most recent Holter monitor.  Stable.  Continue atenolol.    6. Primary hypertension-blood pressure is elevated in office today. Increase enalapril to 40 mg daily.  Continue atenolol and hydrochlorothiazide.  Monitor and record daily blood pressure. Report readings consistently higher than 130/80 or consistently lower than 100/60.     7. Hyperlipidemia LDL goal <70-management per PCP.  Most recent LDL well controlled at 46 on 10/26/23. Continue atorvastatin.        Follow Up   Return in about 6 months (around 12/10/2024) for Next scheduled follow up.  Patient was given instructions and counseling regarding his condition or for health maintenance advice. Please see specific information pulled into the AVS if appropriate.

## 2024-06-10 ENCOUNTER — OFFICE VISIT (OUTPATIENT)
Dept: CARDIOLOGY | Facility: CLINIC | Age: 74
End: 2024-06-10
Payer: MEDICARE

## 2024-06-10 VITALS
HEIGHT: 69 IN | OXYGEN SATURATION: 98 % | SYSTOLIC BLOOD PRESSURE: 143 MMHG | DIASTOLIC BLOOD PRESSURE: 80 MMHG | WEIGHT: 194 LBS | BODY MASS INDEX: 28.73 KG/M2 | HEART RATE: 63 BPM

## 2024-06-10 DIAGNOSIS — I49.3 FREQUENT PVCS: ICD-10-CM

## 2024-06-10 DIAGNOSIS — I10 PRIMARY HYPERTENSION: Chronic | ICD-10-CM

## 2024-06-10 DIAGNOSIS — I10 ESSENTIAL HYPERTENSION: Chronic | ICD-10-CM

## 2024-06-10 DIAGNOSIS — I47.29 NSVT (NONSUSTAINED VENTRICULAR TACHYCARDIA): ICD-10-CM

## 2024-06-10 DIAGNOSIS — E78.5 HYPERLIPIDEMIA LDL GOAL <70: ICD-10-CM

## 2024-06-10 DIAGNOSIS — I47.10 PSVT (PAROXYSMAL SUPRAVENTRICULAR TACHYCARDIA): ICD-10-CM

## 2024-06-10 DIAGNOSIS — I25.10 NON-OCCLUSIVE CORONARY ARTERY DISEASE: Primary | ICD-10-CM

## 2024-06-10 DIAGNOSIS — I51.89 DIASTOLIC DYSFUNCTION: ICD-10-CM

## 2024-06-10 PROCEDURE — 93000 ELECTROCARDIOGRAM COMPLETE: CPT | Performed by: NURSE PRACTITIONER

## 2024-06-10 PROCEDURE — 99214 OFFICE O/P EST MOD 30 MIN: CPT | Performed by: NURSE PRACTITIONER

## 2024-06-10 PROCEDURE — 3078F DIAST BP <80 MM HG: CPT | Performed by: NURSE PRACTITIONER

## 2024-06-10 PROCEDURE — 1160F RVW MEDS BY RX/DR IN RCRD: CPT | Performed by: NURSE PRACTITIONER

## 2024-06-10 PROCEDURE — 3077F SYST BP >= 140 MM HG: CPT | Performed by: NURSE PRACTITIONER

## 2024-06-10 PROCEDURE — 1159F MED LIST DOCD IN RCRD: CPT | Performed by: NURSE PRACTITIONER

## 2024-06-10 RX ORDER — CHOLECALCIFEROL (VITAMIN D3) 125 MCG
CAPSULE ORAL
COMMUNITY

## 2024-06-10 RX ORDER — ENALAPRIL MALEATE 20 MG/1
40 TABLET ORAL DAILY
Qty: 180 TABLET | Refills: 3 | Status: SHIPPED | OUTPATIENT
Start: 2024-06-10

## 2024-09-17 DIAGNOSIS — I10 ESSENTIAL HYPERTENSION: Chronic | ICD-10-CM

## 2024-09-17 RX ORDER — HYDROCHLOROTHIAZIDE 25 MG/1
25 TABLET ORAL DAILY
OUTPATIENT
Start: 2024-09-17

## 2024-11-12 ENCOUNTER — LAB (OUTPATIENT)
Dept: LAB | Facility: HOSPITAL | Age: 74
End: 2024-11-12
Payer: MEDICARE

## 2024-11-12 ENCOUNTER — OFFICE VISIT (OUTPATIENT)
Dept: FAMILY MEDICINE CLINIC | Facility: CLINIC | Age: 74
End: 2024-11-12
Payer: MEDICARE

## 2024-11-12 VITALS
HEART RATE: 62 BPM | SYSTOLIC BLOOD PRESSURE: 126 MMHG | DIASTOLIC BLOOD PRESSURE: 64 MMHG | TEMPERATURE: 97.7 F | BODY MASS INDEX: 28.29 KG/M2 | WEIGHT: 191 LBS | RESPIRATION RATE: 12 BRPM | HEIGHT: 69 IN | OXYGEN SATURATION: 97 %

## 2024-11-12 DIAGNOSIS — E78.5 HYPERLIPIDEMIA LDL GOAL <70: ICD-10-CM

## 2024-11-12 DIAGNOSIS — Z12.5 PROSTATE CANCER SCREENING: ICD-10-CM

## 2024-11-12 DIAGNOSIS — E55.9 VITAMIN D DEFICIENCY: ICD-10-CM

## 2024-11-12 DIAGNOSIS — Z00.00 ENCOUNTER FOR MEDICAL EXAMINATION TO ESTABLISH CARE: Primary | ICD-10-CM

## 2024-11-12 DIAGNOSIS — K21.9 GASTROESOPHAGEAL REFLUX DISEASE, UNSPECIFIED WHETHER ESOPHAGITIS PRESENT: Chronic | ICD-10-CM

## 2024-11-12 DIAGNOSIS — R79.9 ABNORMAL FINDING OF BLOOD CHEMISTRY, UNSPECIFIED: ICD-10-CM

## 2024-11-12 DIAGNOSIS — I10 ESSENTIAL HYPERTENSION: ICD-10-CM

## 2024-11-12 DIAGNOSIS — R42 VERTIGO: ICD-10-CM

## 2024-11-12 DIAGNOSIS — I10 ESSENTIAL HYPERTENSION: Chronic | ICD-10-CM

## 2024-11-12 DIAGNOSIS — R40.0 DAYTIME SOMNOLENCE: ICD-10-CM

## 2024-11-12 LAB
ALBUMIN SERPL-MCNC: 4.2 G/DL (ref 3.5–5)
ALBUMIN/GLOB SERPL: 1.2 G/DL (ref 1.1–2.5)
ALP SERPL-CCNC: 89 U/L (ref 24–120)
ALT SERPL W P-5'-P-CCNC: 27 U/L (ref 0–50)
ANION GAP SERPL CALCULATED.3IONS-SCNC: 8 MMOL/L (ref 4–13)
AST SERPL-CCNC: 29 U/L (ref 7–45)
AUTO MIXED CELLS #: 0.9 10*3/MM3 (ref 0.1–2.6)
AUTO MIXED CELLS %: 10.8 % (ref 0.1–24)
BILIRUB SERPL-MCNC: 0.5 MG/DL (ref 0.1–1)
BUN SERPL-MCNC: 14 MG/DL (ref 5–21)
BUN/CREAT SERPL: 14
CALCIUM SPEC-SCNC: 9 MG/DL (ref 8.6–10.5)
CHLORIDE SERPL-SCNC: 99 MMOL/L (ref 98–110)
CHOLEST SERPL-MCNC: 114 MG/DL (ref 130–200)
CO2 SERPL-SCNC: 27 MMOL/L (ref 24–31)
CREAT SERPL-MCNC: 1 MG/DL (ref 0.5–1.4)
EGFRCR SERPLBLD CKD-EPI 2021: 80.5 ML/MIN/1.73
ERYTHROCYTE [DISTWIDTH] IN BLOOD BY AUTOMATED COUNT: 12.9 % (ref 12.3–15.4)
GLOBULIN UR ELPH-MCNC: 3.4 GM/DL
GLUCOSE SERPL-MCNC: 99 MG/DL (ref 70–100)
HBA1C MFR BLD: 5.1 % (ref 4.8–5.9)
HCT VFR BLD AUTO: 43.4 % (ref 37.5–51)
HDLC SERPL-MCNC: 54 MG/DL
HGB BLD-MCNC: 14.5 G/DL (ref 13–17.7)
LDLC SERPL CALC-MCNC: 43 MG/DL (ref 0–99)
LDLC/HDLC SERPL: 0.79 {RATIO}
LYMPHOCYTES # BLD AUTO: 2.3 10*3/MM3 (ref 0.7–3.1)
LYMPHOCYTES NFR BLD AUTO: 26.9 % (ref 19.6–45.3)
MCH RBC QN AUTO: 31.5 PG (ref 26.6–33)
MCHC RBC AUTO-ENTMCNC: 33.4 G/DL (ref 31.5–35.7)
MCV RBC AUTO: 94.1 FL (ref 79–97)
NEUTROPHILS NFR BLD AUTO: 5.3 10*3/MM3 (ref 1.7–7)
NEUTROPHILS NFR BLD AUTO: 62.3 % (ref 42.7–76)
PLATELET # BLD AUTO: 287 10*3/MM3 (ref 140–450)
PMV BLD AUTO: 9 FL (ref 6–12)
POTASSIUM SERPL-SCNC: 4.5 MMOL/L (ref 3.5–5.3)
PROT SERPL-MCNC: 7.6 G/DL (ref 6.3–8.7)
RBC # BLD AUTO: 4.61 10*6/MM3 (ref 4.14–5.8)
SODIUM SERPL-SCNC: 134 MMOL/L (ref 135–145)
TRIGL SERPL-MCNC: 86 MG/DL (ref 0–149)
VLDLC SERPL-MCNC: 17 MG/DL (ref 5–40)
WBC NRBC COR # BLD AUTO: 8.5 10*3/MM3 (ref 3.4–10.8)

## 2024-11-12 PROCEDURE — 85025 COMPLETE CBC W/AUTO DIFF WBC: CPT

## 2024-11-12 PROCEDURE — G0103 PSA SCREENING: HCPCS

## 2024-11-12 PROCEDURE — 80053 COMPREHEN METABOLIC PANEL: CPT

## 2024-11-12 PROCEDURE — 83036 HEMOGLOBIN GLYCOSYLATED A1C: CPT

## 2024-11-12 PROCEDURE — 80061 LIPID PANEL: CPT

## 2024-11-12 PROCEDURE — 82043 UR ALBUMIN QUANTITATIVE: CPT

## 2024-11-12 PROCEDURE — 82570 ASSAY OF URINE CREATININE: CPT

## 2024-11-12 PROCEDURE — 36415 COLL VENOUS BLD VENIPUNCTURE: CPT

## 2024-11-12 PROCEDURE — 82306 VITAMIN D 25 HYDROXY: CPT

## 2024-11-12 PROCEDURE — 84443 ASSAY THYROID STIM HORMONE: CPT

## 2024-11-12 RX ORDER — ENALAPRIL MALEATE 20 MG/1
40 TABLET ORAL DAILY
Qty: 180 TABLET | Refills: 3 | Status: SHIPPED | OUTPATIENT
Start: 2024-11-12 | End: 2024-11-14

## 2024-11-12 RX ORDER — PROMETHAZINE HYDROCHLORIDE 12.5 MG/1
12.5 TABLET ORAL EVERY 6 HOURS PRN
Qty: 30 TABLET | Refills: 2 | Status: SHIPPED | OUTPATIENT
Start: 2024-11-12

## 2024-11-12 NOTE — PROGRESS NOTES
Chief Complaint  Establish Care    Subjective        Erwin Shah Jr. presents to [facility] PRIMARY CARE    HPI    Here to establish care. He is former pt of Dr. Carson who retired    HLD  -On atorvastatin 20 mg    HTN  -On hydrochlorothiazide 12.5 mg, enalapril 30 mg daily patient reports good blood pressure control with this regimen.  History of frequent PVCs, on atenolol 25 mg as well.  Takes daily aspirin 325 mg    GERD  -On Prilosec 20 mg with good symptom control    Wears hearing aids, has intermittent vertigo.  This occurs 2-4 times per year, worsened with head movements he has used meclizine, asking about alternative.    Past Medical History:   Diagnosis Date    Allergic     Arthritis     Asthma     GERD (gastroesophageal reflux disease)     HL (hearing loss)     Hyperlipidemia     Hypertension      Past Surgical History:   Procedure Laterality Date    CARDIAC CATHETERIZATION Left 6/26/2023    Procedure: Cardiac Catheterization/Vascular Study;  Surgeon: Jeremias Mcnair MD;  Location: [facility] CATH INVASIVE LOCATION;  Service: Cardiology;  Laterality: Left;    CHOLECYSTECTOMY      COLONOSCOPY N/A 7/31/2017    Procedure: COLONOSCOPY;  Surgeon: Ajith Perez MD;  Location: [facility] ENDOSCOPY;  Service:     COLONOSCOPY N/A 8/26/2022    Procedure: COLONOSCOPY WITH ANESTHESIA;  Surgeon: Ajith Perez MD;  Location: [facility] ENDOSCOPY;  Service: Gastroenterology;  Laterality: N/A;  pre hx polyp  post normal  Isidro Carson MD    ENDOSCOPY  05/07/2010     Social History     Socioeconomic History    Marital status:    Tobacco Use    Smoking status: Never     Passive exposure: Never    Smokeless tobacco: Never   Vaping Use    Vaping status: Never Used   Substance and Sexual Activity    Alcohol use: Yes     Alcohol/week: 5.0 standard drinks of alcohol     Types: 2 Cans of beer, 3 Shots of liquor per week     Comment: occasional    Drug use: No    Sexual activity: Yes      "Partners: Female     Birth control/protection: None       Objective   Vital Signs:  /64   Pulse 62   Temp 97.7 °F (36.5 °C) (Temporal)   Resp 12   Ht 175.3 cm (69.02\")   Wt 86.6 kg (191 lb)   SpO2 97%   BMI 28.19 kg/m²   Estimated body mass index is 28.19 kg/m² as calculated from the following:    Height as of this encounter: 175.3 cm (69.02\").    Weight as of this encounter: 86.6 kg (191 lb).       BMI is >= 25 and <30. (Overweight) The following options were offered after discussion;: exercise counseling/recommendations and nutrition counseling/recommendations      Physical Exam  Vitals reviewed.   Constitutional:       Appearance: Normal appearance.   HENT:      Head: Normocephalic.      Nose: Nose normal.      Mouth/Throat:      Mouth: Mucous membranes are moist.   Eyes:      Extraocular Movements: Extraocular movements intact.   Cardiovascular:      Rate and Rhythm: Normal rate and regular rhythm.      Heart sounds: Normal heart sounds.   Pulmonary:      Effort: Pulmonary effort is normal.      Breath sounds: Normal breath sounds.   Musculoskeletal:         General: Normal range of motion.      Cervical back: Normal range of motion.   Skin:     General: Skin is warm and dry.   Neurological:      General: No focal deficit present.      Mental Status: He is alert and oriented to person, place, and time.   Psychiatric:         Mood and Affect: Mood normal.         Behavior: Behavior normal.        Result Review :                   Assessment and Plan   Diagnoses and all orders for this visit:    1. Encounter for medical examination to establish care (Primary)    2. Essential hypertension  -At goal, continue current regimen  -     TSH Rfx On Abnormal To Free T4; Future  -     Comprehensive metabolic panel; Future  -     Microalbumin / Creatinine Urine Ratio - Urine, Clean Catch; Future  -     Hemoglobin A1c; Future  -     CBC w AUTO Differential; Future  -     enalapril (VASOTEC) 20 MG tablet; Take 1.5 " tablets by mouth Daily. MUST KEEP APPT  Dispense: 180 tablet; Refill: 3    3. Hyperlipidemia LDL goal <70  -Continue atorvastatin 20 mg  -     Lipid panel; Future    4. Vertigo  -Appears BPPV-related, intermittent in nature  -     promethazine (PHENERGAN) 12.5 MG tablet; Take 1 tablet by mouth Every 6 (Six) Hours As Needed for Nausea or Vomiting (vertigo).  Dispense: 30 tablet; Refill: 2    5. Gastroesophageal reflux disease, unspecified whether esophagitis present  -Continue Prilosec 20 mg    6. Daytime somnolence  -     Ambulatory Referral to Sleep Medicine-> to [facility] for home sleep study    7. Vitamin D deficiency  -     Vitamin D 25 hydroxy; Future    8. Prostate cancer screening  -     PSA SCREENING; Future    9. Abnormal finding of blood chemistry, unspecified  -     Hemoglobin A1c; Future             EMR Dragon/Transcription disclaimer:   Much of this encounter note is an electronic transcription/translation of spoken language to printed text. The electronic translation of spoken language may permit erroneous, or at times, nonsensical words or phrases to be inadvertently transcribed; although attempts have made to review the note for such errors, some may still exist. Please excuse any unrecognized transcription errors and contact us if the air is unintelligible or needs documented correction. Also, portions of this note have been copied forward, however, changed to reflect the most current clinical status of this patient.  Follow Up   Return in about 2 weeks (around 11/26/2024).  Patient was given instructions and counseling regarding his condition or for health maintenance advice. Please see specific information pulled into the AVS if appropriate.

## 2024-11-13 LAB
25(OH)D3 SERPL-MCNC: 36.9 NG/ML (ref 30–100)
ALBUMIN UR-MCNC: <1.2 MG/DL
CREAT UR-MCNC: 82.8 MG/DL
MICROALBUMIN/CREAT UR: NORMAL MG/G{CREAT}
PSA SERPL-MCNC: 2.92 NG/ML (ref 0–4)
TSH SERPL DL<=0.05 MIU/L-ACNC: 1.18 UIU/ML (ref 0.27–4.2)

## 2024-11-14 RX ORDER — ENALAPRIL MALEATE 20 MG/1
30 TABLET ORAL DAILY
Qty: 180 TABLET | Refills: 3 | Status: SHIPPED | OUTPATIENT
Start: 2024-11-14

## 2024-11-15 ENCOUNTER — PATIENT ROUNDING (BHMG ONLY) (OUTPATIENT)
Dept: FAMILY MEDICINE CLINIC | Facility: CLINIC | Age: 74
End: 2024-11-15
Payer: MEDICARE

## 2024-11-15 NOTE — PROGRESS NOTES
November 15, 2024    Hello, may I speak with Erwin Shah Jr.?    My name is Christine Spence HECTOR welcoming the pt to our office and left our number to call us back if he has any questions or concerns      I am  with [facility]  [facility]  [address] [place]  [place] [address]  [phone].    Before we get started may I verify your date of birth? 1950    I am calling to officially welcome you to our practice and ask about your recent visit. Is this a good time to talk?     Tell me about your visit with us. What things went well?         We're always looking for ways to make our patients' experiences even better. Do you have recommendations on ways we may improve?      Overall were you satisfied with your first visit to our practice?        I appreciate you taking the time to speak with me today. Is there anything else I can do for you?       Thank you, and have a great day.

## 2024-12-04 ENCOUNTER — OFFICE VISIT (OUTPATIENT)
Dept: FAMILY MEDICINE CLINIC | Facility: CLINIC | Age: 74
End: 2024-12-04
Payer: MEDICARE

## 2024-12-04 VITALS
WEIGHT: 192 LBS | DIASTOLIC BLOOD PRESSURE: 72 MMHG | HEIGHT: 69 IN | TEMPERATURE: 97.8 F | RESPIRATION RATE: 12 BRPM | OXYGEN SATURATION: 97 % | HEART RATE: 47 BPM | SYSTOLIC BLOOD PRESSURE: 140 MMHG | BODY MASS INDEX: 28.44 KG/M2

## 2024-12-04 DIAGNOSIS — E78.5 HYPERLIPIDEMIA LDL GOAL <70: ICD-10-CM

## 2024-12-04 DIAGNOSIS — Z00.00 MEDICARE ANNUAL WELLNESS VISIT, SUBSEQUENT: Primary | ICD-10-CM

## 2024-12-04 DIAGNOSIS — I10 PRIMARY HYPERTENSION: Chronic | ICD-10-CM

## 2024-12-04 DIAGNOSIS — I47.10 PSVT (PAROXYSMAL SUPRAVENTRICULAR TACHYCARDIA): ICD-10-CM

## 2024-12-04 PROCEDURE — 3078F DIAST BP <80 MM HG: CPT | Performed by: STUDENT IN AN ORGANIZED HEALTH CARE EDUCATION/TRAINING PROGRAM

## 2024-12-04 PROCEDURE — 96160 PT-FOCUSED HLTH RISK ASSMT: CPT | Performed by: STUDENT IN AN ORGANIZED HEALTH CARE EDUCATION/TRAINING PROGRAM

## 2024-12-04 PROCEDURE — 1126F AMNT PAIN NOTED NONE PRSNT: CPT | Performed by: STUDENT IN AN ORGANIZED HEALTH CARE EDUCATION/TRAINING PROGRAM

## 2024-12-04 PROCEDURE — G0439 PPPS, SUBSEQ VISIT: HCPCS | Performed by: STUDENT IN AN ORGANIZED HEALTH CARE EDUCATION/TRAINING PROGRAM

## 2024-12-04 PROCEDURE — 3077F SYST BP >= 140 MM HG: CPT | Performed by: STUDENT IN AN ORGANIZED HEALTH CARE EDUCATION/TRAINING PROGRAM

## 2024-12-04 NOTE — PROGRESS NOTES
"           Chief Complaint  labs and Hypertension    Subjective    {Problem List  Visit Diagnosis   Encounters  Notes  Medications  Labs  Result Review Imaging  Media :23}    Erwin Shah Jr. presents to [facility]    HPI    The ASCVD Risk score (Don LORENZO, et al., 2019) failed to calculate for the following reasons:    The valid total cholesterol range is 130 to 320 mg/dL`        Past Medical History:   Diagnosis Date    Allergic     Arthritis     Asthma     GERD (gastroesophageal reflux disease)     HL (hearing loss)     Hyperlipidemia     Hypertension      Past Surgical History:   Procedure Laterality Date    CARDIAC CATHETERIZATION Left 6/26/2023    Procedure: Cardiac Catheterization/Vascular Study;  Surgeon: Jeremias Mcnair MD;  Location: [facility] CATH INVASIVE LOCATION;  Service: Cardiology;  Laterality: Left;    CHOLECYSTECTOMY      COLONOSCOPY N/A 7/31/2017    Procedure: COLONOSCOPY;  Surgeon: Ajith Perez MD;  Location:  [facility] END[facility];  Service:     COLONOSCOPY N/A 8/26/2022    Procedure: COLONOSCOPY WITH ANESTHESIA;  Surgeon: Ajith Perez MD;  Location: [facility] ENDOSCOPY;  Service: Gastroenterology;  Laterality: N/A;  pre hx polyp  post normal  Isidro Carson MD    ENDOSCOPY  05/07/2010     Social History     Socioeconomic History    Marital status:    Tobacco Use    Smoking status: Never     Passive exposure: Never    Smokeless tobacco: Never   Vaping Use    Vaping status: Never Used   Substance and Sexual Activity    Alcohol use: Yes     Alcohol/week: 5.0 standard drinks of alcohol     Types: 2 Cans of beer, 3 Shots of liquor per week     Comment: occasional    Drug use: No    Sexual activity: Yes     Partners: Female     Birth control/protection: None       Objective   Vital Signs:  /72   Pulse (!) 47   Temp 97.8 °F (36.6 °C) (Temporal)   Resp 12   Ht 175.3 cm (69.02\")   Wt 87.1 kg (192 lb)   SpO2 97%   BMI 28.34 kg/m²   Estimated " "body mass index is 28.34 kg/m² as calculated from the following:    Height as of this encounter: 175.3 cm (69.02\").    Weight as of this encounter: 87.1 kg (192 lb).              Physical Exam   Result Review :{Labs  Result Review  Imaging  Med Tab  Media  Procedures :23}  {The following data was reviewed by (Optional):72730}  {Lab Choices (Optional):57779}  {Data reviewed (Optional):79806:::1}             Assessment and Plan {CC Problem List  Visit Diagnosis   ROS  Review (Popup)  Health Maintenance  Quality  BestPractice  Medications  SmartSets  SnapShot Encounters  Media :23}  There are no diagnoses linked to this encounter.    Lower atenolol to 12.5 mg  Cut lipitor 10 mg       {Time Spent (Optional):52729}  EMR Dragon/Transcription disclaimer:   Much of this encounter note is an electronic transcription/translation of spoken language to printed text. The electronic translation of spoken language may permit erroneous, or at times, nonsensical words or phrases to be inadvertently transcribed; although attempts have made to review the note for such errors, some may still exist. Please excuse any unrecognized transcription errors and contact us if the air is unintelligible or needs documented correction. Also, portions of this note have been copied forward, however, changed to reflect the most current clinical status of this patient.  Follow Up {Instructions Charge Capture  Follow-up Communications :23}  No follow-ups on file.  Patient was given instructions and counseling regarding his condition or for health maintenance advice. Please see specific information pulled into the AVS if appropriate.       "

## 2024-12-04 NOTE — PROGRESS NOTES
The ABCs of the Annual Wellness Visit  Subsequent Medicare Wellness Visit    Subjective    Ewrin Shah Jr. is a 73 y.o. male who presents for a Subsequent Medicare Wellness Visit.    The following portions of the patient's history were reviewed and   updated as appropriate: allergies, current medications, past family history, past medical history, past social history, past surgical history, and problem list.    Compared to one year ago, the patient feels his physical   health is the same.    Compared to one year ago, the patient feels his mental   health is the same.    Recent Hospitalizations:  He was not admitted to the hospital during the last year.       Current Medical Providers:  Patient Care Team:  Davy uRbi MD as PCP - General (Family Medicine)  Ajith Perez MD as Consulting Physician (Gastroenterology)  Jeremias Mcnair MD as Cardiologist (Cardiology)  Ayana Guy APRN as Nurse Practitioner (Family Medicine)    Outpatient Medications Prior to Visit   Medication Sig Dispense Refill    aspirin  MG tablet Take 1 tablet by mouth Daily.      atenolol (TENORMIN) 25 MG tablet Take 1 tablet by mouth Daily. 90 tablet 3    atorvastatin (LIPITOR) 20 MG tablet TAKE 1 TABLET BY MOUTH EVERY DAY 90 tablet 3    Cholecalciferol (Vitamin D3) 50 MCG (2000 UT) tablet Take  by mouth.      enalapril (VASOTEC) 20 MG tablet Take 1.5 tablets by mouth Daily. MUST KEEP APPT 180 tablet 3    hydroCHLOROthiazide (HYDRODIURIL) 12.5 MG tablet TAKE 1 TABLET BY MOUTH EVERY DAY 90 tablet 3    meclizine (ANTIVERT) 25 MG tablet Take 1 tablet by mouth As Needed for Dizziness (rare).      nitroglycerin (NITROSTAT) 0.4 MG SL tablet 1 under the tongue as needed for angina, may repeat q5mins for up three doses 25 tablet 3    omeprazole (priLOSEC) 20 MG capsule Take 1 capsule by mouth Daily.      promethazine (PHENERGAN) 12.5 MG tablet Take 1 tablet by mouth Every 6 (Six) Hours As Needed for Nausea or Vomiting (vertigo). 30  "tablet 2     No facility-administered medications prior to visit.       No opioid medication identified on active medication list. I have reviewed chart for other potential  high risk medication/s and harmful drug interactions in the elderly.        Aspirin is on active medication list. .      Patient Active Problem List   Diagnosis    Wellness examination-done    Gastroesophageal reflux disease    Hyperlipidemia LDL goal <70    Hypertension    Palpitations    Primary generalized (osteo)arthritis    Laboratory test    Anticoagulated prevention+DJD+angina/, ()ASA 81x2)    Allergic rhinitis    Vertigo-? allergy related    Prostatism    Skin lesion    Actinic keratosis    Cardiac arrhythmia: bPVC    Diastolic dysfunction    History of adenomatous polyp of colon    Non-smoker    Non-occlusive coronary artery disease    Chest pain in adult    PSVT (paroxysmal supraventricular tachycardia)    NSVT (nonsustained ventricular tachycardia)    Frequent PVCs     Advance Care Planning  Advance Directive is not on file.  ACP discussion was held with the patient during this visit. Patient does not have an advance directive, information provided.     Objective    Vitals:    12/04/24 1206   BP: 140/72   Pulse: (!) 47   Resp: 12   Temp: 97.8 °F (36.6 °C)   TempSrc: Temporal   SpO2: 97%   Weight: 87.1 kg (192 lb)   Height: 175.3 cm (69.02\")     Estimated body mass index is 28.34 kg/m² as calculated from the following:    Height as of this encounter: 175.3 cm (69.02\").    Weight as of this encounter: 87.1 kg (192 lb).           Does the patient have evidence of cognitive impairment? No    Lab Results   Component Value Date    TRIG 86 11/12/2024    HDL 54 11/12/2024    LDL 43 11/12/2024    VLDL 17 11/12/2024    HGBA1C 5.1 11/12/2024        HEALTH RISK ASSESSMENT    Smoking Status:  Social History     Tobacco Use   Smoking Status Never    Passive exposure: Never   Smokeless Tobacco Never     Alcohol Consumption:  Social History "     Substance and Sexual Activity   Alcohol Use Yes    Alcohol/week: 5.0 standard drinks of alcohol    Types: 2 Cans of beer, 3 Shots of liquor per week    Comment: occasional     Fall Risk Screen:    [facility] Fall Risk Assessment was completed, and patient is at LOW risk for falls.Assessment completed on:2024    Depression Screenin/4/2024    12:12 PM   PHQ-2/PHQ-9 Depression Screening   Little interest or pleasure in doing things Not at all   Feeling down, depressed, or hopeless Not at all       Health Habits and Functional and Cognitive Screenin/4/2024    12:14 PM   Functional & Cognitive Status   Do you have difficulty preparing food and eating? No   Do you have difficulty bathing yourself, getting dressed or grooming yourself? No   Do you have difficulty using the toilet? No   Do you have difficulty moving around from place to place? No   Do you have trouble with steps or getting out of a bed or a chair? No   Current Diet Well Balanced Diet   Dental Exam Up to date   Eye Exam Up to date   Exercise (times per week) 7 times per week   Current Exercises Include Yard Work;Walking   Do you need help using the phone?  No   Are you deaf or do you have serious difficulty hearing?  No   Do you need help to go to places out of walking distance? No   Do you need help shopping? No   Do you need help preparing meals?  No   Do you need help with housework?  No   Do you need help with laundry? No   Do you need help taking your medications? No   Do you need help managing money? No   Do you ever drive or ride in a car without wearing a seat belt? No   Have you felt unusual stress, anger or loneliness in the last month? No   Who do you live with? Other   If you need help, do you have trouble finding someone available to you? No   Have you been bothered in the last four weeks by sexual problems? No   Do you have difficulty concentrating, remembering or making decisions? No       Age-appropriate Screening  Schedule:  Refer to the list below for future screening recommendations based on patient's age, sex and/or medical conditions. Orders for these recommended tests are listed in the plan section. The patient has been provided with a written plan.    Health Maintenance   Topic Date Due    ZOSTER VACCINE (1 of 2) Never done    COVID-19 Vaccine (6 - 2024-25 season) 09/01/2024    LIPID PANEL  11/12/2025    BMI FOLLOWUP  11/12/2025    ANNUAL WELLNESS VISIT  12/04/2025    COLORECTAL CANCER SCREENING  08/26/2027    TDAP/TD VACCINES (3 - Td or Tdap) 09/30/2030    HEPATITIS C SCREENING  Completed    INFLUENZA VACCINE  Completed    Pneumococcal Vaccine 65+  Completed                CMS Preventative Services Quick Reference  Risk Factors Identified During Encounter  Dental Screening Recommended  Vision Screening Recommended  The above risks/problems have been discussed with the patient.  Pertinent information has been shared with the patient in the After Visit Summary.  An After Visit Summary and PPPS were made available to the patient.    Follow Up:   Next Medicare Wellness visit to be scheduled in 1 year.       Additional E&M Note during same encounter follows:  Patient has multiple medical problems which are significant and separately identifiable that require additional work above and beyond the Medicare Wellness Visit.      Chief Complaint  labs and Hypertension    Subjective        Erwin Shah Jr. presents to [facility] PRIMARY [facility]ARE    HPI    Patient here for follow-up in addition to Medicare visit  HR running in 40's.  On atenolol 25 mg.  History of PSVT, frequent PVCs and SVT  Sleep study in progress at home  Vertigo symptoms stable    Past Medical History:   Diagnosis Date    Allergic     Arthritis     Asthma     GERD (gastroesophageal reflux disease)     HL (hearing loss)     Hyperlipidemia     Hypertension      Past Surgical History:   Procedure Laterality Date    CARDIAC CATHETERIZATION Left  "6/26/2023    Procedure: Cardiac Catheterization/Vascular Study;  Surgeon: Jeremias Mcnair MD;  Location: [facility] CATH INVASIVE LOCATION;  Service: Cardiology;  Laterality: Left;    CHOLECYSTECTOMY      COLONOSCOPY N/A 7/31/2017    Procedure: COLONOSCOPY;  Surgeon: Ajith Perez MD;  Location: [facility] ENDOSCOPY;  Service:     COLONOSCOPY N/A 8/26/2022    Procedure: COLONOSCOPY WITH ANESTHESIA;  Surgeon: Ajith Perez MD;  Location: [facility] ENDOSCOPY;  Service: Gastroenterology;  Laterality: N/A;  pre hx polyp  post normal  Isidro Carson MD    ENDOSCOPY  05/07/2010     Social History     Socioeconomic History    Marital status:    Tobacco Use    Smoking status: Never     Passive exposure: Never    Smokeless tobacco: Never   Vaping Use    Vaping status: Never Used   Substance and Sexual Activity    Alcohol use: Yes     Alcohol/week: 5.0 standard drinks of alcohol     Types: 2 Cans of beer, 3 Shots of liquor per week     Comment: occasional    Drug use: No    Sexual activity: Yes     Partners: Female     Birth control/protection: None       Objective   Vital Signs:  /72   Pulse (!) 47   Temp 97.8 °F (36.6 °C) (Temporal)   Resp 12   Ht 175.3 cm (69.02\")   Wt 87.1 kg (192 lb)   SpO2 97%   BMI 28.34 kg/m²   Estimated body mass index is 28.34 kg/m² as calculated from the following:    Height as of this encounter: 175.3 cm (69.02\").    Weight as of this encounter: 87.1 kg (192 lb).               Physical Exam  Vitals reviewed.   Constitutional:       Appearance: Normal appearance.   HENT:      Head: Normocephalic.      Nose: Nose normal.      Mouth/Throat:      Mouth: Mucous membranes are moist.   Eyes:      Extraocular Movements: Extraocular movements intact.   Cardiovascular:      Rate and Rhythm: Normal rate and regular rhythm.      Heart sounds: Normal heart sounds.   Pulmonary:      Effort: Pulmonary effort is normal.      Breath sounds: Normal breath sounds.   Musculoskeletal:         " General: Normal range of motion.      Cervical back: Normal range of motion.   Skin:     General: Skin is warm and dry.   Neurological:      General: No focal deficit present.      Mental Status: He is alert and oriented to person, place, and time.   Psychiatric:         Mood and Affect: Mood normal.         Behavior: Behavior normal.        Result Review :                   Assessment and Plan   Diagnoses and all orders for this visit:    1. Medicare annual wellness visit, subsequent (Primary)  -Labs discussed.  Preventative screening recommendations discussed    2. Primary hypertension  -Will monitor, until next visit continue enalapril 30 mg, hydrochlorothiazide 12 and half milligrams    3. Hyperlipidemia LDL goal <70  -To minimize myalgias/medication side effects will reduce Lipitor 10 mg    4. PSVT (paroxysmal supraventricular tachycardia)  -Due to persistent bradycardia will lower atenolol 12.5 mg daily          Follow Up   Return in about 2 months (around 2/4/2025).  Patient was given instructions and counseling regarding his condition or for health maintenance advice. Please see specific information pulled into the AVS if appropriate.

## 2025-02-04 ENCOUNTER — OFFICE VISIT (OUTPATIENT)
Dept: FAMILY MEDICINE CLINIC | Facility: CLINIC | Age: 75
End: 2025-02-04
Payer: MEDICARE

## 2025-02-04 VITALS
RESPIRATION RATE: 14 BRPM | HEART RATE: 62 BPM | HEIGHT: 69 IN | TEMPERATURE: 98.7 F | SYSTOLIC BLOOD PRESSURE: 138 MMHG | BODY MASS INDEX: 29 KG/M2 | WEIGHT: 195.8 LBS | DIASTOLIC BLOOD PRESSURE: 72 MMHG | OXYGEN SATURATION: 98 %

## 2025-02-04 DIAGNOSIS — G47.33 OSA (OBSTRUCTIVE SLEEP APNEA): ICD-10-CM

## 2025-02-04 DIAGNOSIS — E78.5 HYPERLIPIDEMIA LDL GOAL <70: ICD-10-CM

## 2025-02-04 DIAGNOSIS — Z12.5 PROSTATE CANCER SCREENING: ICD-10-CM

## 2025-02-04 DIAGNOSIS — R79.9 ABNORMAL FINDING OF BLOOD CHEMISTRY, UNSPECIFIED: ICD-10-CM

## 2025-02-04 DIAGNOSIS — I10 PRIMARY HYPERTENSION: Primary | ICD-10-CM

## 2025-02-04 PROCEDURE — 3075F SYST BP GE 130 - 139MM HG: CPT | Performed by: STUDENT IN AN ORGANIZED HEALTH CARE EDUCATION/TRAINING PROGRAM

## 2025-02-04 PROCEDURE — 1126F AMNT PAIN NOTED NONE PRSNT: CPT | Performed by: STUDENT IN AN ORGANIZED HEALTH CARE EDUCATION/TRAINING PROGRAM

## 2025-02-04 PROCEDURE — G2211 COMPLEX E/M VISIT ADD ON: HCPCS | Performed by: STUDENT IN AN ORGANIZED HEALTH CARE EDUCATION/TRAINING PROGRAM

## 2025-02-04 PROCEDURE — 3078F DIAST BP <80 MM HG: CPT | Performed by: STUDENT IN AN ORGANIZED HEALTH CARE EDUCATION/TRAINING PROGRAM

## 2025-02-04 PROCEDURE — 99214 OFFICE O/P EST MOD 30 MIN: CPT | Performed by: STUDENT IN AN ORGANIZED HEALTH CARE EDUCATION/TRAINING PROGRAM

## 2025-02-04 RX ORDER — HYDROCHLOROTHIAZIDE 12.5 MG/1
12.5 TABLET ORAL DAILY
Qty: 90 TABLET | Refills: 5 | Status: SHIPPED | OUTPATIENT
Start: 2025-02-04

## 2025-02-04 RX ORDER — ATORVASTATIN CALCIUM 20 MG/1
20 TABLET, FILM COATED ORAL DAILY
Qty: 90 TABLET | Refills: 3 | Status: SHIPPED | OUTPATIENT
Start: 2025-02-04

## 2025-02-04 RX ORDER — ENALAPRIL MALEATE 20 MG/1
40 TABLET ORAL DAILY
Qty: 60 TABLET | Refills: 5 | Status: SHIPPED | OUTPATIENT
Start: 2025-02-04

## 2025-02-04 NOTE — PROGRESS NOTES
Chief Complaint  Hypertension    Subjective        Erwin Shah Jr. presents to [facility] PRIMARY CARE    HPI    History of Present Illness  The patient presents for evaluation of blood pressure management, sleep apnea, and medication management.    He has been utilizing a CPAP machine since recent MAGGIE dx, which he reports has improved his sleep quality and eliminated episodes of breath-holding during the night.    His blood pressure readings at home have consistently been in the upper 130s, with no recent readings in the 140s. He expresses satisfaction with his current blood pressure control, noting that it has remained around 135 or 136, a significant improvement from previous readings in the upper 140s. He is on enalapril 30 mg, hctz 12.5 mg currently.    He is currently on a daily regimen of over-the-counter omeprazole. He has previously received the Zostavax vaccine.    He is also taking atorvastatin and atenolol. His atenolol dosage was reduced to 12.5 mg at his last visit. He has not observed a significant change in his heart rate following the adjustment of his atenolol dosage. He was advised to alternate his Lipitor dosage between a whole tablet and half a tablet, but he discontinued this practice and resumed his regular dosage. He reports tolerating the medication well.    MEDICATIONS  - Enalapril  - Hydrochlorothiazide  - Omeprazole  - Atorvastatin  - Atenolol    IMMUNIZATIONS  He has received the Zostavax vaccine.    Past Medical History:   Diagnosis Date    Allergic     Arthritis     Asthma     GERD (gastroesophageal reflux disease)     HL (hearing loss)     Hyperlipidemia     Hypertension      Past Surgical History:   Procedure Laterality Date    CARDIAC CATHETERIZATION Left 6/26/2023    Procedure: Cardiac Catheterization/Vascular Study;  Surgeon: Jeremias Mcnair MD;  Location: [facility] CATH INVASIVE LOCATION;  Service: Cardiology;  Laterality: Left;    CHOLECYSTECTOMY       "COLONOSCOPY N/A 7/31/2017    Procedure: COLONOSCOPY;  Surgeon: Ajith Perez MD;  Location: [facility] ENDOSCOPY;  Service:     COLONOSCOPY N/A 8/26/2022    Procedure: COLONOSCOPY WITH ANESTHESIA;  Surgeon: Ajith Perez MD;  Location: [facility] ENDOSCOPY;  Service: Gastroenterology;  Laterality: N/A;  pre hx polyp  post normal  Isidro Carson MD    ENDOSCOPY  05/07/2010     Social History     Socioeconomic History    Marital status:    Tobacco Use    Smoking status: Never     Passive exposure: Never    Smokeless tobacco: Never   Vaping Use    Vaping status: Never Used   Substance and Sexual Activity    Alcohol use: Yes     Alcohol/week: 5.0 standard drinks of alcohol     Types: 2 Cans of beer, 3 Shots of liquor per week     Comment: occasional    Drug use: No    Sexual activity: Yes     Partners: Female     Birth control/protection: None       Objective   Vital Signs:  /72   Pulse 62   Temp 98.7 °F (37.1 °C) (Temporal)   Resp 14   Ht 175.3 cm (69.02\")   Wt 88.8 kg (195 lb 12.8 oz)   SpO2 98%   BMI 28.90 kg/m²   Estimated body mass index is 28.9 kg/m² as calculated from the following:    Height as of this encounter: 175.3 cm (69.02\").    Weight as of this encounter: 88.8 kg (195 lb 12.8 oz).              Physical Exam  Vitals reviewed.   Constitutional:       Appearance: Normal appearance.   HENT:      Head: Normocephalic.      Nose: Nose normal.      Mouth/Throat:      Mouth: Mucous membranes are moist.   Eyes:      Extraocular Movements: Extraocular movements intact.   Cardiovascular:      Rate and Rhythm: Normal rate and regular rhythm.      Heart sounds: Normal heart sounds.   Pulmonary:      Effort: Pulmonary effort is normal.      Breath sounds: Normal breath sounds.   Musculoskeletal:         General: Normal range of motion.      Cervical back: Normal range of motion.   Skin:     General: Skin is warm and dry.   Neurological:      General: No focal deficit present.      Mental " Status: He is alert and oriented to person, place, and time.   Psychiatric:         Mood and Affect: Mood normal.         Behavior: Behavior normal.        Physical Exam  Vital Signs  Heart rate is 62.    Result Review :        Results               Assessment and Plan   Diagnoses and all orders for this visit:    1. Primary hypertension (Primary)  -Given borderline readings, recommend increasing enalapril to 40 mg daily.  Continue hydrochlorothiazide 12.5 mg.  Recommend we keep close eye on blood pressure over next month and monitor for hypotensive/orthostatic symptoms.  Should BP remain controlled we will continue this regimen until follow-up visit.  -     enalapril (VASOTEC) 20 MG tablet; Take 2 tablets by mouth Daily.  Dispense: 60 tablet; Refill: 5  -     hydroCHLOROthiazide 12.5 MG tablet; Take 1 tablet by mouth Daily.  Dispense: 90 tablet; Refill: 5    2. Hyperlipidemia LDL goal <70  -     atorvastatin (LIPITOR) 20 MG tablet; Take 1 tablet by mouth Daily.  Dispense: 90 tablet; Refill: 3    3. MAGGIE (obstructive sleep apnea)  -Continue CPAP for noted    Follow-up 9 months for AWV, labs       EMR Dragon/Transcription disclaimer:   Part of this note may be an electronic transcription/translation of spoken language to printed text using the Dragon Dictation System     Follow Up   Return in about 9 months (around 11/4/2025).    Patient or patient representative verbalized consent for the use of Ambient Listening during the visit with  Davy Rubi MD for chart documentation. 2/4/2025  09:25 CST    Patient was given instructions and counseling regarding his condition or for health maintenance advice. Please see specific information pulled into the AVS if appropriate.

## 2025-02-17 RX ORDER — ATENOLOL 25 MG/1
25 TABLET ORAL DAILY
Qty: 90 TABLET | Refills: 3 | Status: SHIPPED | OUTPATIENT
Start: 2025-02-17

## 2025-03-04 ENCOUNTER — OFFICE VISIT (OUTPATIENT)
Dept: FAMILY MEDICINE CLINIC | Facility: CLINIC | Age: 75
End: 2025-03-04
Payer: MEDICARE

## 2025-03-04 VITALS
WEIGHT: 196 LBS | OXYGEN SATURATION: 97 % | TEMPERATURE: 97.8 F | SYSTOLIC BLOOD PRESSURE: 132 MMHG | HEIGHT: 69 IN | DIASTOLIC BLOOD PRESSURE: 62 MMHG | RESPIRATION RATE: 14 BRPM | HEART RATE: 57 BPM | BODY MASS INDEX: 29.03 KG/M2

## 2025-03-04 DIAGNOSIS — H01.001 BLEPHARITIS OF RIGHT UPPER EYELID, UNSPECIFIED TYPE: Primary | ICD-10-CM

## 2025-03-04 PROCEDURE — 3078F DIAST BP <80 MM HG: CPT | Performed by: STUDENT IN AN ORGANIZED HEALTH CARE EDUCATION/TRAINING PROGRAM

## 2025-03-04 PROCEDURE — 3075F SYST BP GE 130 - 139MM HG: CPT | Performed by: STUDENT IN AN ORGANIZED HEALTH CARE EDUCATION/TRAINING PROGRAM

## 2025-03-04 PROCEDURE — 1126F AMNT PAIN NOTED NONE PRSNT: CPT | Performed by: STUDENT IN AN ORGANIZED HEALTH CARE EDUCATION/TRAINING PROGRAM

## 2025-03-04 PROCEDURE — 99213 OFFICE O/P EST LOW 20 MIN: CPT | Performed by: STUDENT IN AN ORGANIZED HEALTH CARE EDUCATION/TRAINING PROGRAM

## 2025-03-04 PROCEDURE — G2211 COMPLEX E/M VISIT ADD ON: HCPCS | Performed by: STUDENT IN AN ORGANIZED HEALTH CARE EDUCATION/TRAINING PROGRAM

## 2025-03-04 RX ORDER — POLYMYXIN B SULFATE AND TRIMETHOPRIM 1; 10000 MG/ML; [USP'U]/ML
2 SOLUTION OPHTHALMIC EVERY 4 HOURS
Qty: 10 ML | Refills: 1 | Status: SHIPPED | OUTPATIENT
Start: 2025-03-04

## 2025-03-04 NOTE — PROGRESS NOTES
Chief Complaint  Blepharitis    Subjective        Erwin Shah Jr. presents to [facility] PRIMARY [facility]ARE    HPI    History of Present Illness  The patient presents for evaluation of upper eyelid swelling.    He reports a progressive worsening of his condition over the past 3 days, characterized by significant swelling, discomfort, and mild itching in the R eye. He has not experienced any fever or changes in vision but acknowledges that the swelling is causing irritation. He also reports mild discharge from the eye. He recalls participating in a tandem jump on Friday, an activity he had never engaged in before. However, he notes that his symptoms had already begun approximately 5 days prior to this event. He expresses concern about the itching, as he has no prior history of conjunctivitis.    His blood pressure is well-controlled, and he has stopped splitting his atenolol dosage. He is taking the regular pill.    MEDICATIONS  - Current: Atenolol    Past Medical History:   Diagnosis Date    Allergic     Arthritis     Asthma     GERD (gastroesophageal reflux disease)     HL (hearing loss)     Hyperlipidemia     Hypertension      Past Surgical History:   Procedure Laterality Date    CARDIAC CATHETERIZATION Left 6/26/2023    Procedure: Cardiac Catheterization/Vascular Study;  Surgeon: Jeremias Mcnair MD;  Location: [facility] CATH INVASIVE LOCATION;  Service: Cardiology;  Laterality: Left;    CHOLECYSTECTOMY      COLONOSCOPY N/A 7/31/2017    Procedure: COLONOSCOPY;  Surgeon: Ajith Perez MD;  Location: [facility] ENDOSCOPY;  Service:     COLONOSCOPY N/A 8/26/2022    Procedure: COLONOSCOPY WITH ANESTHESIA;  Surgeon: Ajith Perez MD;  Location: [facility] ENDOSCOPY;  Service: Gastroenterology;  Laterality: N/A;  pre hx polyp  post normal  Isidro Carson MD    ENDOSCOPY  05/07/2010     Social History     Socioeconomic History    Marital status:    Tobacco Use    Smoking status: Never      "Passive exposure: Never    Smokeless tobacco: Never   Vaping Use    Vaping status: Never Used   Substance and Sexual Activity    Alcohol use: Yes     Alcohol/week: 5.0 standard drinks of alcohol     Types: 2 Cans of beer, 3 Shots of liquor per week     Comment: occasional    Drug use: No    Sexual activity: Yes     Partners: Female     Birth control/protection: None       Objective   Vital Signs:  /62   Pulse 57   Temp 97.8 °F (36.6 °C) (Temporal)   Resp 14   Ht 175.3 cm (69.02\")   Wt 88.9 kg (196 lb)   SpO2 97%   BMI 28.93 kg/m²   Estimated body mass index is 28.93 kg/m² as calculated from the following:    Height as of this encounter: 175.3 cm (69.02\").    Weight as of this encounter: 88.9 kg (196 lb).              Physical Exam  Vitals reviewed.   Constitutional:       Appearance: Normal appearance.   HENT:      Head: Normocephalic.      Nose: Nose normal.      Mouth/Throat:      Mouth: Mucous membranes are moist.   Eyes:      General:         Right eye: Hordeolum present. No discharge.      Extraocular Movements: Extraocular movements intact.      Conjunctiva/sclera:      Right eye: Right conjunctiva is not injected. No chemosis or exudate.     Left eye: Left conjunctiva is not injected. No chemosis or exudate.       Comments: R upper eyelid mildly focally edematous and erythematous w/ signs of crusting. Sclera normal. EOMI. No gross visual change/deficit.   Cardiovascular:      Rate and Rhythm: Normal rate and regular rhythm.      Heart sounds: Normal heart sounds.   Pulmonary:      Effort: Pulmonary effort is normal.      Breath sounds: Normal breath sounds.   Musculoskeletal:         General: Normal range of motion.      Cervical back: Normal range of motion.   Skin:     General: Skin is warm and dry.   Neurological:      General: No focal deficit present.      Mental Status: He is alert and oriented to person, place, and time.   Psychiatric:         Mood and Affect: Mood normal.         " Behavior: Behavior normal.        Physical Exam  There is some redness of the upper eyelid.    Result Review :        Results               Assessment and Plan   Diagnoses and all orders for this visit:    1. Blepharitis of right upper eyelid, unspecified type (Primary)  -     trimethoprim-polymyxin b (Polytrim) 67540-4.1 UNIT/ML-% ophthalmic solution; Administer 2 drops to the right eye Every 4 (Four) Hours.  Dispense: 10 mL; Refill: 1  The condition is likely due to inflammation or blockage of the glands in the eyelid or eyelash follicles, causing swelling. It is expected to resolve naturally over time. He is advised to apply warm compresses and gently massage the affected area 3 to 4 times daily. A prescription for a topical antibiotic will be sent to [facility] pharmacy to help lubricate and soothe the eye. He should monitor the condition closely over the next 4 days and report any worsening of symptoms.           EMR Dragon/Transcription disclaimer:   Part of this note may be an electronic transcription/translation of spoken language to printed text using the Dragon Dictation System     Follow Up   No follow-ups on file.    Patient or patient representative verbalized consent for the use of Ambient Listening during the visit with  Davy Rubi MD for chart documentation. 3/4/2025  09:44 CST    Patient was given instructions and counseling regarding his condition or for health maintenance advice. Please see specific information pulled into the AVS if appropriate.

## 2025-05-07 ENCOUNTER — TELEPHONE (OUTPATIENT)
Dept: FAMILY MEDICINE CLINIC | Facility: CLINIC | Age: 75
End: 2025-05-07

## 2025-05-07 DIAGNOSIS — G47.33 OSA (OBSTRUCTIVE SLEEP APNEA): Primary | ICD-10-CM

## 2025-05-07 NOTE — TELEPHONE ENCOUNTER
Caller: Erwin Shah Jr.    Relationship to patient: Self    Best call back number:   Telephone Information:   Mobile [phone]         Patient is needing: RENEWAL ORDERS FOR HIS CPAP SUPPLIES SENT OVER TO [facility]. PT HAS NOT YET HAD A RESUPPLY AND WILL BE THE INITIAL RENEWAL. PLEASE CALL ONCE SENT

## 2025-05-13 RX ORDER — HYDROCHLOROTHIAZIDE 25 MG/1
25 TABLET ORAL DAILY
OUTPATIENT
Start: 2025-05-13

## 2025-05-15 NOTE — PROGRESS NOTES
Chief Complaint  Non-occlusive CAD (11mo F/U), NSVT, and Hypertension    Subjective          Erwin Shah Jr. presents to [facility] CARDIOLOGY for routine follow-up.  He has nonocclusive coronary artery disease (30 to 40% stenosis of the proximal diagonal branch on left heart catheterization 6/26/2023), left ventricular diastolic dysfunction paroxysmal supraventricular tachycardia, nonsustained ventricular tachycardia, frequent PVCs, hypertension, and hyperlipidemia. He reports chronic dyspnea with moderate to heavy exertion that has not worsened.  He reports increased fatigue. Patient denies chest pain, palpitations, dizziness, syncope, orthopnea, PND, or edema.  Patient denies any signs of bleeding.    Coronary Artery Disease  Presents for follow-up visit. Symptoms include shortness of breath. Pertinent negatives include no chest pain, chest pressure, chest tightness, dizziness, leg swelling, muscle weakness, palpitations or weight gain. Risk factors include hyperlipidemia. The symptoms have been stable. Compliance with diet is variable. Compliance with exercise is variable. Compliance with medications is good.   Hypertension  This is a chronic problem. The current episode started more than 1 year ago. The problem is controlled. Associated symptoms include malaise/fatigue and shortness of breath. Pertinent negatives include no anxiety, blurred vision, chest pain, headaches, neck pain, orthopnea, palpitations, peripheral edema, PND or sweats. Risk factors for coronary artery disease include male gender and dyslipidemia. Current antihypertension treatment includes diuretics, beta blockers and ACE inhibitors. The current treatment provides significant improvement.   Hyperlipidemia  This is a chronic problem. The current episode started more than 1 year ago. Associated symptoms include shortness of breath. Pertinent negatives include no chest pain. Current antihyperlipidemic treatment includes  "statins. Risk factors for coronary artery disease include hypertension, male sex and dyslipidemia.     I have reviewed and confirmed the accuracy of the ROS  CHRISSY Schwarz      Objective     Current Outpatient Medications:     aspirin  MG tablet, Take 1 tablet by mouth Daily., Disp: , Rfl:     atorvastatin (LIPITOR) 20 MG tablet, Take 1 tablet by mouth Daily. (Patient taking differently: Take 0.5 tablets by mouth Daily.), Disp: 90 tablet, Rfl: 3    Cholecalciferol (Vitamin D3) 50 MCG (2000 UT) tablet, Take  by mouth., Disp: , Rfl:     enalapril (VASOTEC) 20 MG tablet, Take 2 tablets by mouth Daily. MUST KEEP APPT, Disp: 180 tablet, Rfl: 3    hydroCHLOROthiazide 25 MG tablet, Take 1 tablet by mouth Daily., Disp: 90 tablet, Rfl: 3    nitroglycerin (NITROSTAT) 0.4 MG SL tablet, 1 under the tongue as needed for angina, may repeat q5mins for up three doses, Disp: 25 tablet, Rfl: 3    omeprazole (priLOSEC) 20 MG capsule, Take 1 capsule by mouth Daily., Disp: , Rfl:     promethazine (PHENERGAN) 12.5 MG tablet, Take 1 tablet by mouth Every 6 (Six) Hours As Needed for Nausea or Vomiting (vertigo)., Disp: 30 tablet, Rfl: 2  Vital Signs:   /80   Pulse 59   Ht 175.3 cm (69\")   Wt 89.8 kg (198 lb)   SpO2 100%   BMI 29.24 kg/m²     Vitals and nursing note reviewed.   Constitutional:       General: Awake.      Appearance: Normal and healthy appearance. Well-developed, normal weight and not in distress.   Eyes:      General: Lids are normal.      Conjunctiva/sclera: Conjunctivae normal.      Pupils: Pupils are equal, round, and reactive to light.   HENT:      Head: Normocephalic and atraumatic.      Nose: Nose normal.   Neck:      Vascular: No JVR. JVD normal.   Pulmonary:      Effort: Pulmonary effort is normal.      Breath sounds: Normal breath sounds. No wheezing. No rhonchi. No rales.   Chest:      Chest wall: Not tender to palpatation.   Cardiovascular:      PMI at left midclavicular line. " Bradycardia present. Regular rhythm. Normal S1. Normal S2.       Murmurs: There is no murmur.      No gallop.  No click. No rub.   Pulses:     Intact distal pulses.   Edema:     Peripheral edema absent.   Abdominal:      General: Bowel sounds are normal.      Palpations: Abdomen is soft.      Tenderness: There is no abdominal tenderness.   Musculoskeletal: Normal range of motion.         General: No tenderness.      Cervical back: Normal range of motion. Skin:     General: Skin is warm and dry.   Neurological:      General: No focal deficit present.      Mental Status: Alert, oriented to person, place, and time and oriented to person, place and time.   Psychiatric:         Attention and Perception: Attention and perception normal.         Mood and Affect: Mood and affect normal.         Speech: Speech normal.         Behavior: Behavior normal. Behavior is cooperative.         Thought Content: Thought content normal.         Cognition and Memory: Cognition and memory normal.         Judgment: Judgment normal.        Result Review :   The following data was reviewed by: CHRISSY Shcwarz on 5/16/2025:  Common labs          11/12/2024    13:51   Common Labs   Glucose 99    BUN 14    Creatinine 1.00    Sodium 134    Potassium 4.5    Chloride 99    Calcium 9.0    Albumin 4.2    Total Bilirubin 0.5    Alkaline Phosphatase 89    AST (SGOT) 29    ALT (SGPT) 27    WBC 8.50    Hemoglobin 14.5    Hematocrit 43.4    Platelets 287    Total Cholesterol 114    Triglycerides 86    HDL Cholesterol 54    LDL Cholesterol  43    Hemoglobin A1C 5.1    Microalbumin, Urine <1.2    PSA 2.920      Data reviewed : Cardiology studies 2d echo 6/17/21 and 4/28/23, left heart catheterization 6/26/23 and Holter monitor 9/11/2023      ECG 12 Lead    Date/Time: 5/16/2025 11:14 AM  Performed by: Ayana Guy APRN    Authorized by: Ayana Guy APRN  Comparison: compared with previous ECG from 6/10/2024  Rhythm: sinus  bradycardia  Rate: bradycardic  BPM: 59  QRS axis: right    Clinical impression: abnormal EKG            Assessment and Plan    Diagnoses and all orders for this visit:    1. Non-occlusive coronary artery disease (Primary)-30 to 40% stenosis of the proximal diagonal branch on left heart catheterization 6/26/2023.  No clinical signs of ischemia.  Continue aspirin.    2. PSVT (paroxysmal supraventricular tachycardia)-5 runs with longest duration of 11.7 seconds on most recent Holter monitor.  Stable.      3. NSVT (nonsustained ventricular tachycardia)-1 run with duration of 16 beats on most recent Holter monitor.  Stable.      4. Diastolic dysfunction-left ventricular diastolic dysfunction on 2D echo 6/17/2021.  Normal function on most recent 2D echo 4/28/2023.  No clinical signs or symptoms of congestive heart failure. Reviewed signs and symptoms of CHF and what to report with the patient. Patient instructed to restrict sodium and weigh daily. Report weight gain of greater than 2 lbs overnight or 5 lbs in 1 week. Pt verbalized understanding of instructions and plan of care.     5. Frequent PVCs-6.5% burden on most recent Holter monitor.  Stable.      6. Primary hypertension-blood pressure is mildly elevated in office today. Increase HCTZ to 25 mg daily.  Monitor and record daily blood pressure. Report readings consistently higher than 130/80 or consistently lower than 100/60.     7. Hyperlipidemia LDL goal <70-management per PCP.  Most recent LDL well controlled at 43 on lipid panel 11/12/2024. Continue atorvastatin.    8. Bradycardia, drug-induced- pt reports heart rates in the 50s consistently, which he believes is contributing to fatigue. Wean and discontinue atenolol.       Follow Up   Return in about 1 year (around 5/16/2026) for Next scheduled follow up.  Patient was given instructions and counseling regarding his condition or for health maintenance advice. Please see specific information pulled into the AVS if  appropriate.

## 2025-05-16 ENCOUNTER — OFFICE VISIT (OUTPATIENT)
Dept: CARDIOLOGY | Facility: CLINIC | Age: 75
End: 2025-05-16
Payer: MEDICARE

## 2025-05-16 VITALS
BODY MASS INDEX: 29.33 KG/M2 | WEIGHT: 198 LBS | SYSTOLIC BLOOD PRESSURE: 133 MMHG | DIASTOLIC BLOOD PRESSURE: 80 MMHG | HEIGHT: 69 IN | HEART RATE: 59 BPM | OXYGEN SATURATION: 100 %

## 2025-05-16 DIAGNOSIS — I47.29 NSVT (NONSUSTAINED VENTRICULAR TACHYCARDIA): ICD-10-CM

## 2025-05-16 DIAGNOSIS — E78.5 HYPERLIPIDEMIA LDL GOAL <70: ICD-10-CM

## 2025-05-16 DIAGNOSIS — T50.905A BRADYCARDIA, DRUG INDUCED: ICD-10-CM

## 2025-05-16 DIAGNOSIS — I10 ESSENTIAL HYPERTENSION: Chronic | ICD-10-CM

## 2025-05-16 DIAGNOSIS — I47.10 PSVT (PAROXYSMAL SUPRAVENTRICULAR TACHYCARDIA): ICD-10-CM

## 2025-05-16 DIAGNOSIS — I49.3 FREQUENT PVCS: ICD-10-CM

## 2025-05-16 DIAGNOSIS — R00.1 BRADYCARDIA, DRUG INDUCED: ICD-10-CM

## 2025-05-16 DIAGNOSIS — I51.89 DIASTOLIC DYSFUNCTION: ICD-10-CM

## 2025-05-16 DIAGNOSIS — I10 PRIMARY HYPERTENSION: Chronic | ICD-10-CM

## 2025-05-16 DIAGNOSIS — I25.10 NON-OCCLUSIVE CORONARY ARTERY DISEASE: Primary | ICD-10-CM

## 2025-05-16 RX ORDER — HYDROCHLOROTHIAZIDE 25 MG/1
25 TABLET ORAL DAILY
Qty: 90 TABLET | Refills: 3 | Status: SHIPPED | OUTPATIENT
Start: 2025-05-16

## 2025-05-16 RX ORDER — ENALAPRIL MALEATE 20 MG/1
40 TABLET ORAL DAILY
Qty: 180 TABLET | Refills: 3 | Status: SHIPPED | OUTPATIENT
Start: 2025-05-16

## (undated) DEVICE — Device: Brand: DEFENDO AIR/WATER/SUCTION AND BIOPSY VALVE

## (undated) DEVICE — SNAR POLYP SENSATION MICRO OVL 13 240X40

## (undated) DEVICE — YANKAUER,BULB TIP WITH VENT: Brand: ARGYLE

## (undated) DEVICE — ENDOGATOR AUXILIARY WATER JET CONNECTOR: Brand: ENDOGATOR

## (undated) DEVICE — CUFF,BP,DISP,1 TUBE,ADULT,HP: Brand: MEDLINE

## (undated) DEVICE — THE SINGLE USE ETRAP – POLYP TRAP IS USED FOR SUCTION RETRIEVAL OF ENDOSCOPICALLY REMOVED POLYPS.: Brand: ETRAP

## (undated) DEVICE — SENSR O2 OXIMAX FNGR A/ 18IN NONSTR

## (undated) DEVICE — MASK,OXYGEN,MED CONC,ADLT,7' TUB, UC: Brand: PENDING

## (undated) DEVICE — THE CHANNEL CLEANING BRUSH IS A NYLON FLEXI BRUSH ATTACHED TO A FLEXIBLE PLASTIC SHEATH DESIGNED TO SAFELY REMOVE DEBRIS FROM FLEXIBLE ENDOSCOPES.

## (undated) DEVICE — MSK O2 MD CONCENTR A/ LF 7FT 1P/U

## (undated) DEVICE — TBG SMPL FLTR LINE NASL 02/C02 A/ BX/100